# Patient Record
Sex: FEMALE | Race: WHITE | NOT HISPANIC OR LATINO | Employment: UNEMPLOYED | URBAN - METROPOLITAN AREA
[De-identification: names, ages, dates, MRNs, and addresses within clinical notes are randomized per-mention and may not be internally consistent; named-entity substitution may affect disease eponyms.]

---

## 2023-09-09 ENCOUNTER — HOSPITAL ENCOUNTER (OUTPATIENT)
Dept: DATA CONVERSION | Facility: HOSPITAL | Age: 48
Discharge: HOME | End: 2023-09-09

## 2023-09-09 DIAGNOSIS — E55.9 VITAMIN D DEFICIENCY, UNSPECIFIED: ICD-10-CM

## 2023-09-09 DIAGNOSIS — M32.19 OTHER ORGAN OR SYSTEM INVOLVEMENT IN SYSTEMIC LUPUS ERYTHEMATOSUS (MULTI): ICD-10-CM

## 2023-09-09 DIAGNOSIS — M06.09 RHEUMATOID ARTHRITIS WITHOUT RHEUMATOID FACTOR, MULTIPLE SITES (MULTI): ICD-10-CM

## 2023-09-09 LAB
ALBUMIN SERPL-MCNC: 3.6 GM/DL (ref 3.5–5)
ALBUMIN/GLOB SERPL: 1.1 RATIO (ref 1.5–3)
ALP BLD-CCNC: 93 U/L (ref 35–125)
ALT SERPL-CCNC: 9 U/L (ref 5–40)
ANION GAP SERPL CALCULATED.3IONS-SCNC: 10 MMOL/L (ref 0–19)
AST SERPL-CCNC: 14 U/L (ref 5–40)
BACTERIA SPEC CULT: NORMAL
BASOPHILS # BLD AUTO: 0.08 K/UL (ref 0–0.22)
BASOPHILS NFR BLD AUTO: 1.8 % (ref 0–1)
BILIRUB SERPL-MCNC: 0.2 MG/DL (ref 0.1–1.2)
BILIRUB UR QL STRIP.AUTO: NEGATIVE
BUN SERPL-MCNC: 13 MG/DL (ref 8–25)
BUN/CREAT SERPL: 26 RATIO (ref 8–21)
C3 SERPL-MCNC: 158 MG/DL (ref 68–260)
C4 SERPL-MCNC: 28 MG/DL (ref 16–64)
CALCIUM SERPL-MCNC: 8.9 MG/DL (ref 8.5–10.4)
CC # UR: NORMAL /UL
CHLORIDE SERPL-SCNC: 110 MMOL/L (ref 97–107)
CK SERPL-CCNC: 41 U/L (ref 24–195)
CLARITY UR: CLEAR
CO2 SERPL-SCNC: 25 MMOL/L (ref 24–31)
COLOR UR: YELLOW
CREAT SERPL-MCNC: 0.5 MG/DL (ref 0.4–1.6)
CRP SERPL-MCNC: 1 MG/DL (ref 0–2)
DEPRECATED RDW RBC AUTO: 49.5 FL (ref 37–54)
DIFFERENTIAL METHOD BLD: ABNORMAL
EOSINOPHIL # BLD AUTO: 0.13 K/UL (ref 0–0.45)
EOSINOPHIL NFR BLD: 2.9 % (ref 0–3)
ERYTHROCYTE [DISTWIDTH] IN BLOOD BY AUTOMATED COUNT: 14.7 % (ref 11.7–15)
ERYTHROCYTE [SEDIMENTATION RATE] IN BLOOD BY WESTERGREN METHOD: 78 MM/HR (ref 0–20)
GFR SERPL CREATININE-BSD FRML MDRD: 118 ML/MIN/1.73 M2
GLOBULIN SER-MCNC: 3.3 G/DL (ref 1.9–3.7)
GLUCOSE SERPL-MCNC: 93 MG/DL (ref 65–99)
GLUCOSE UR STRIP.AUTO-MCNC: NEGATIVE MG/DL
HCT VFR BLD AUTO: 37.1 % (ref 36–44)
HGB BLD-MCNC: 11.4 GM/DL (ref 12–15)
HGB UR QL STRIP.AUTO: ABNORMAL /HPF (ref 0–3)
HGB UR QL: NEGATIVE
IMM GRANULOCYTES # BLD AUTO: 0.01 K/UL (ref 0–0.1)
KETONES UR QL STRIP.AUTO: NEGATIVE
LEUKOCYTE ESTERASE UR QL STRIP.AUTO: ABNORMAL
LYMPHOCYTES # BLD AUTO: 1.21 K/UL (ref 1.2–3.2)
LYMPHOCYTES NFR BLD MANUAL: 27.3 % (ref 20–40)
MCH RBC QN AUTO: 28.4 PG (ref 26–34)
MCHC RBC AUTO-ENTMCNC: 30.7 % (ref 31–37)
MCV RBC AUTO: 92.5 FL (ref 80–100)
MICRO URNS: ABNORMAL
MICROSCOPIC (UA): ABNORMAL
MONOCYTES # BLD AUTO: 0.39 K/UL (ref 0–0.8)
MONOCYTES NFR BLD MANUAL: 8.8 % (ref 0–8)
NEUTROPHILS # BLD AUTO: 2.62 K/UL
NEUTROPHILS # BLD AUTO: 2.62 K/UL (ref 1.8–7.7)
NEUTROPHILS.IMMATURE NFR BLD: 0.2 % (ref 0–1)
NEUTS SEG NFR BLD: 59 % (ref 50–70)
NITRITE UR QL STRIP.AUTO: NEGATIVE
NRBC BLD-RTO: 0 /100 WBC
PH UR STRIP.AUTO: 5.5 [PH] (ref 4.6–8)
PLATELET # BLD AUTO: 144 K/UL (ref 150–450)
PMV BLD AUTO: 12.1 CU (ref 7–12.6)
POTASSIUM SERPL-SCNC: 3.7 MMOL/L (ref 3.4–5.1)
PROT SERPL-MCNC: 6.9 G/DL (ref 5.9–7.9)
PROT UR STRIP.AUTO-MCNC: ABNORMAL MG/DL
RBC # BLD AUTO: 4.01 M/UL (ref 4–4.9)
REF LAB TEST RESULTS: NORMAL
REPORT STATUS -LH SQ DATA CONVERSION: NORMAL
SERVICE CMNT-IMP: NORMAL
SODIUM SERPL-SCNC: 145 MMOL/L (ref 133–145)
SP GR UR STRIP.AUTO: 1.04 (ref 1–1.03)
SPECIMEN SOURCE: NORMAL
UNSPECIFIED CRY UR QL COMP ASSIST: ABNORMAL
URINE CULTURE: ABNORMAL
UROBILINOGEN UR QL STRIP.AUTO: NORMAL MG/DL (ref 0–1)
WBC # BLD AUTO: 4.4 K/UL (ref 4.5–11)
WBC #/AREA URNS AUTO: ABNORMAL /HPF (ref 0–3)

## 2023-09-10 LAB — DSDNA AB SER IA-ACNC: NORMAL [IU]/ML

## 2023-09-13 LAB — 1,25(OH)2D3 SERPL-MCNC: NORMAL PG/ML

## 2023-09-15 PROBLEM — E88.09 HYPERALBUMINEMIA: Status: ACTIVE | Noted: 2023-09-15

## 2023-09-15 PROBLEM — F41.8 MIXED ANXIETY DEPRESSIVE DISORDER: Status: ACTIVE | Noted: 2023-09-15

## 2023-09-15 PROBLEM — I89.0 LYMPHEDEMA: Status: ACTIVE | Noted: 2023-09-15

## 2023-09-15 PROBLEM — M79.7 FIBROMYALGIA: Status: ACTIVE | Noted: 2023-09-15

## 2023-09-15 PROBLEM — R87.811 VAGINAL HIGH RISK HPV DNA TEST POSITIVE: Status: ACTIVE | Noted: 2023-09-15

## 2023-09-15 PROBLEM — L03.90 CELLULITIS: Status: ACTIVE | Noted: 2023-09-15

## 2023-09-15 PROBLEM — M25.50 ARTHRALGIA: Status: ACTIVE | Noted: 2023-09-15

## 2023-09-15 PROBLEM — M35.00 SICCA (MULTI): Status: ACTIVE | Noted: 2023-09-15

## 2023-09-15 PROBLEM — M32.19 OTHER ORGAN OR SYSTEM INVOLVEMENT IN SYSTEMIC LUPUS ERYTHEMATOSUS (MULTI): Status: ACTIVE | Noted: 2023-09-15

## 2023-09-15 PROBLEM — S99.929A INJURY OF FOOT: Status: ACTIVE | Noted: 2023-09-15

## 2023-09-15 PROBLEM — M45.9 ANKYLOSING SPONDYLITIS (MULTI): Status: ACTIVE | Noted: 2023-09-15

## 2023-09-15 PROBLEM — K76.89 AUTOIMMUNE LIVER DISEASE: Status: ACTIVE | Noted: 2023-09-15

## 2023-09-15 PROBLEM — M06.09 SERONEGATIVE RHEUMATOID ARTHRITIS OF MULTIPLE SITES (MULTI): Status: ACTIVE | Noted: 2023-09-15

## 2023-09-15 PROBLEM — N92.6 IRREGULAR PERIODS: Status: ACTIVE | Noted: 2023-09-15

## 2023-09-15 PROBLEM — I73.00 RAYNAUD'S DISEASE WITHOUT GANGRENE: Status: ACTIVE | Noted: 2023-09-15

## 2023-09-15 PROBLEM — M06.9 RHEUMATOID ARTHRITIS (MULTI): Status: ACTIVE | Noted: 2023-09-15

## 2023-09-15 PROBLEM — N91.2 AMENORRHEA: Status: ACTIVE | Noted: 2023-09-15

## 2023-09-15 PROBLEM — H25.13 AGE-RELATED NUCLEAR CATARACT OF BOTH EYES: Status: ACTIVE | Noted: 2023-09-15

## 2023-09-15 PROBLEM — R87.610 ATYP SQUAM CELL OF UNDET SIGNFC CYTO SMR CRVX (ASC-US): Status: ACTIVE | Noted: 2023-09-15

## 2023-09-15 PROBLEM — Q28.8: Status: ACTIVE | Noted: 2023-09-15

## 2023-09-15 PROBLEM — R87.810 CERVICAL HIGH RISK HUMAN PAPILLOMAVIRUS (HPV) DNA TEST POSITIVE: Status: ACTIVE | Noted: 2023-09-15

## 2023-09-15 PROBLEM — G44.89 OTHER HEADACHE SYNDROME: Status: ACTIVE | Noted: 2023-09-15

## 2023-09-15 PROBLEM — E78.5 HYPERLIPIDEMIA: Status: ACTIVE | Noted: 2023-09-15
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
## 2023-09-15 PROBLEM — E66.9 OBESITY: Status: ACTIVE | Noted: 2023-09-15

## 2023-09-15 PROBLEM — E53.8 VITAMIN B12 DEFICIENCY (NON ANEMIC): Status: ACTIVE | Noted: 2023-09-15

## 2023-09-15 PROBLEM — D69.6 THROMBOCYTOPENIA (CMS-HCC): Status: ACTIVE | Noted: 2023-09-15

## 2023-09-15 PROBLEM — E03.9 HYPOTHYROIDISM: Status: ACTIVE | Noted: 2023-09-15

## 2023-09-15 PROBLEM — R07.89 ATYPICAL CHEST PAIN: Status: ACTIVE | Noted: 2023-09-15

## 2023-09-15 PROBLEM — H44.23 PATHOLOGIC MYOPIA, BILATERAL: Status: ACTIVE | Noted: 2023-09-15

## 2023-09-15 PROBLEM — E55.9 VITAMIN D DEFICIENCY: Status: ACTIVE | Noted: 2023-09-15

## 2023-09-15 PROBLEM — R59.1 LYMPHADENOPATHY: Status: ACTIVE | Noted: 2023-09-15

## 2023-09-15 PROBLEM — Q24.9 CONGENITAL HEART DEFECT (HHS-HCC): Status: ACTIVE | Noted: 2023-09-15

## 2023-09-15 RX ORDER — PETROLATUM,WHITE 41 %
OINTMENT (GRAM) TOPICAL DAILY
COMMUNITY
Start: 2021-07-21 | End: 2024-03-04 | Stop reason: WASHOUT

## 2023-09-15 RX ORDER — ARTIFICIAL TEARS 1; 2; 3 MG/ML; MG/ML; MG/ML
SOLUTION/ DROPS OPHTHALMIC
COMMUNITY
End: 2024-03-04 | Stop reason: WASHOUT

## 2023-09-15 RX ORDER — SKIN CLEANSER
CLEANSER (GRAM) TOPICAL
COMMUNITY
Start: 2018-04-05 | End: 2024-03-04 | Stop reason: WASHOUT

## 2023-09-15 RX ORDER — HYDROXYZINE HYDROCHLORIDE 10 MG/1
TABLET, FILM COATED ORAL EVERY 8 HOURS PRN
COMMUNITY
Start: 2020-05-21 | End: 2024-03-04 | Stop reason: WASHOUT

## 2023-09-15 RX ORDER — FLUTICASONE PROPIONATE 50 MCG
1 SPRAY, SUSPENSION (ML) NASAL DAILY
COMMUNITY
Start: 2015-04-10 | End: 2024-03-04 | Stop reason: WASHOUT

## 2023-09-15 RX ORDER — TRIAMCINOLONE ACETONIDE 1 MG/G
OINTMENT TOPICAL
COMMUNITY
Start: 2021-01-12 | End: 2024-03-04 | Stop reason: WASHOUT

## 2023-09-15 RX ORDER — ERGOCALCIFEROL 1.25 MG/1
1 CAPSULE ORAL 3 TIMES WEEKLY
COMMUNITY
Start: 2020-08-10 | End: 2024-04-24 | Stop reason: SDUPTHER

## 2023-09-15 RX ORDER — CLOTRIMAZOLE AND BETAMETHASONE DIPROPIONATE 10; .64 MG/G; MG/G
CREAM TOPICAL
COMMUNITY
Start: 2020-11-30 | End: 2024-03-04 | Stop reason: WASHOUT

## 2023-09-15 RX ORDER — NABUMETONE 750 MG/1
1 TABLET, FILM COATED ORAL 2 TIMES DAILY PRN
COMMUNITY
Start: 2015-04-10 | End: 2024-03-04 | Stop reason: WASHOUT

## 2023-09-15 RX ORDER — HYDROXYZINE HYDROCHLORIDE 25 MG/1
25 TABLET, FILM COATED ORAL EVERY 6 HOURS PRN
COMMUNITY
Start: 2023-05-08 | End: 2024-03-04 | Stop reason: WASHOUT

## 2023-09-15 RX ORDER — LEVOTHYROXINE SODIUM 100 UG/1
1 TABLET ORAL DAILY
COMMUNITY
Start: 2015-04-10 | End: 2024-03-04 | Stop reason: WASHOUT

## 2023-09-15 RX ORDER — LEFLUNOMIDE 20 MG/1
20 TABLET ORAL DAILY
COMMUNITY
Start: 2022-07-25 | End: 2024-03-04 | Stop reason: WASHOUT

## 2023-09-15 RX ORDER — HYDROXYCHLOROQUINE SULFATE 200 MG/1
200 TABLET, FILM COATED ORAL 2 TIMES DAILY
COMMUNITY
Start: 2015-04-10 | End: 2024-04-24 | Stop reason: SDUPTHER

## 2023-09-15 RX ORDER — LANOLIN ALCOHOL/MO/W.PET/CERES
1 CREAM (GRAM) TOPICAL DAILY
COMMUNITY
Start: 2015-04-10

## 2023-09-15 RX ORDER — LEFLUNOMIDE 10 MG/1
TABLET ORAL
COMMUNITY
Start: 2020-11-30 | End: 2024-03-04 | Stop reason: WASHOUT

## 2023-09-15 RX ORDER — PREDNISONE 10 MG/1
10 TABLET ORAL DAILY
COMMUNITY
Start: 2023-05-08 | End: 2024-03-04 | Stop reason: WASHOUT

## 2023-09-15 RX ORDER — FLUCONAZOLE 150 MG/1
TABLET ORAL
COMMUNITY
Start: 2021-03-19 | End: 2024-03-04 | Stop reason: WASHOUT

## 2023-09-15 RX ORDER — LEVOTHYROXINE SODIUM 137 UG/1
137 TABLET ORAL
COMMUNITY
Start: 2020-05-29

## 2023-09-15 RX ORDER — MUPIROCIN 20 MG/G
1 OINTMENT TOPICAL 3 TIMES DAILY
COMMUNITY
End: 2024-04-24 | Stop reason: ALTCHOICE

## 2023-09-15 RX ORDER — FOLIC ACID 1 MG/1
TABLET ORAL
COMMUNITY
Start: 2020-11-30 | End: 2024-03-04 | Stop reason: WASHOUT

## 2023-09-15 RX ORDER — HYDROCORTISONE 25 MG/G
1 CREAM TOPICAL DAILY
COMMUNITY
Start: 2020-10-13 | End: 2024-03-04 | Stop reason: WASHOUT

## 2023-09-15 RX ORDER — FUROSEMIDE 20 MG/1
20 TABLET ORAL DAILY
COMMUNITY
Start: 2021-07-07 | End: 2024-03-04 | Stop reason: WASHOUT

## 2023-09-16 PROBLEM — H35.341 LAMELLAR MACULAR HOLE, RIGHT EYE: Status: ACTIVE | Noted: 2023-09-16

## 2023-09-16 PROBLEM — H35.371 EPIRETINAL MEMBRANE (ERM) OF RIGHT EYE: Status: ACTIVE | Noted: 2023-09-16

## 2023-09-16 RX ORDER — METRONIDAZOLE 500 MG/1
TABLET ORAL
COMMUNITY
End: 2024-03-04 | Stop reason: WASHOUT

## 2023-09-16 RX ORDER — METRONIDAZOLE 7.5 MG/G
CREAM TOPICAL
COMMUNITY
End: 2024-03-04 | Stop reason: WASHOUT

## 2023-09-16 RX ORDER — DOXYCYCLINE 50 MG/1
TABLET ORAL
COMMUNITY
End: 2024-03-04 | Stop reason: WASHOUT

## 2023-10-17 ENCOUNTER — OFFICE VISIT (OUTPATIENT)
Dept: OPHTHALMOLOGY | Facility: CLINIC | Age: 48
End: 2023-10-17
Payer: COMMERCIAL

## 2023-10-17 DIAGNOSIS — H35.371 EPIRETINAL MEMBRANE (ERM) OF RIGHT EYE: Primary | ICD-10-CM

## 2023-10-17 DIAGNOSIS — H43.821 VITREOMACULAR TRACTION SYNDROME, RIGHT: ICD-10-CM

## 2023-10-17 DIAGNOSIS — H35.372 EPIRETINAL MEMBRANE (ERM) OF LEFT EYE: ICD-10-CM

## 2023-10-17 PROCEDURE — 92134 CPTRZ OPH DX IMG PST SGM RTA: CPT | Mod: BILATERAL PROCEDURE

## 2023-10-17 PROCEDURE — 99213 OFFICE O/P EST LOW 20 MIN: CPT

## 2023-10-17 ASSESSMENT — VISUAL ACUITY
OD_CC: 20/40
OS_CC: 20/30
METHOD: SNELLEN - LINEAR

## 2023-10-17 ASSESSMENT — CONF VISUAL FIELD
OS_SUPERIOR_NASAL_RESTRICTION: 0
OS_SUPERIOR_TEMPORAL_RESTRICTION: 0
OD_SUPERIOR_NASAL_RESTRICTION: 0
OD_INFERIOR_NASAL_RESTRICTION: 0
OS_NORMAL: 1
OS_INFERIOR_TEMPORAL_RESTRICTION: 0
OD_SUPERIOR_TEMPORAL_RESTRICTION: 0
OD_NORMAL: 1
OS_INFERIOR_NASAL_RESTRICTION: 0
OD_INFERIOR_TEMPORAL_RESTRICTION: 0

## 2023-10-17 ASSESSMENT — ENCOUNTER SYMPTOMS
NEUROLOGICAL NEGATIVE: 0
CONSTITUTIONAL NEGATIVE: 0
ALLERGIC/IMMUNOLOGIC NEGATIVE: 0
GASTROINTESTINAL NEGATIVE: 0
PSYCHIATRIC NEGATIVE: 0
RESPIRATORY NEGATIVE: 0
CARDIOVASCULAR NEGATIVE: 0
ENDOCRINE NEGATIVE: 0
EYES NEGATIVE: 0
HEMATOLOGIC/LYMPHATIC NEGATIVE: 0
MUSCULOSKELETAL NEGATIVE: 0

## 2023-10-17 ASSESSMENT — SLIT LAMP EXAM - LIDS
COMMENTS: NORMAL
COMMENTS: NORMAL

## 2023-10-17 ASSESSMENT — TONOMETRY
OD_IOP_MMHG: 16
IOP_METHOD: GOLDMANN APPLANATION
OS_IOP_MMHG: 16

## 2023-10-17 ASSESSMENT — EXTERNAL EXAM - LEFT EYE: OS_EXAM: NORMAL

## 2023-10-17 ASSESSMENT — CUP TO DISC RATIO
OD_RATIO: 0.3
OS_RATIO: 0.3

## 2023-10-17 ASSESSMENT — EXTERNAL EXAM - RIGHT EYE: OD_EXAM: NORMAL

## 2023-10-17 NOTE — PROGRESS NOTES
Pathologic myopia, [id number]  - Lattice at 3 o'clock, right eye (OD)     Vitreomacular traction syndrome, right  Epiretinal membrane (ERM) of left eye  - OCT:   Right eye (OD): focal VMT with intraretinal cysts  OS: Normal retinal contour, thickness, mild ERM    Impression:  Focal VMT, right eye (OD) - not visually significant;   Observe  RTC in 3 months    Long-term use of [id number]  - Duration: Started ~2003 for lupus  - Dose: 400 mg/day   - No renal or liver disease  - No visual complaints  - Prior HVF showed non-specific depression with poor markers of reliability  - No evidence of hydroxychloroquine toxicity on exam or OCT  - Continue care with dr. Faustin     Age-related nuclear cataract of both eyesH25.13  - Suspect secondary to lupus with history of steroid use

## 2024-01-18 ENCOUNTER — TRANSCRIBE ORDERS (OUTPATIENT)
Dept: RHEUMATOLOGY | Facility: CLINIC | Age: 49
End: 2024-01-18

## 2024-01-18 DIAGNOSIS — Z79.899 ON LONG TERM LEFLUNOMIDE THERAPY: ICD-10-CM

## 2024-01-18 DIAGNOSIS — D64.9 NORMOCYTIC ANEMIA: ICD-10-CM

## 2024-01-18 DIAGNOSIS — R53.83 FATIGUE, UNSPECIFIED TYPE: ICD-10-CM

## 2024-01-18 DIAGNOSIS — Z79.899 LONG-TERM USE OF PLAQUENIL: ICD-10-CM

## 2024-01-18 DIAGNOSIS — M06.09 SERONEGATIVE RHEUMATOID ARTHRITIS OF MULTIPLE SITES (MULTI): ICD-10-CM

## 2024-01-18 DIAGNOSIS — I89.0 LYMPHEDEMA: ICD-10-CM

## 2024-01-18 DIAGNOSIS — Z13.220 ENCOUNTER FOR SCREENING FOR LIPID DISORDER: ICD-10-CM

## 2024-01-18 DIAGNOSIS — Z79.899 OTHER LONG TERM (CURRENT) DRUG THERAPY: ICD-10-CM

## 2024-01-18 DIAGNOSIS — M32.19 OTHER SYSTEMIC LUPUS ERYTHEMATOSUS WITH OTHER ORGAN INVOLVEMENT (MULTI): ICD-10-CM

## 2024-01-23 ENCOUNTER — APPOINTMENT (OUTPATIENT)
Dept: OPHTHALMOLOGY | Facility: CLINIC | Age: 49
End: 2024-01-23

## 2024-02-07 ENCOUNTER — APPOINTMENT (OUTPATIENT)
Dept: RHEUMATOLOGY | Facility: CLINIC | Age: 49
End: 2024-02-07
Payer: COMMERCIAL

## 2024-02-11 ENCOUNTER — LAB REQUISITION (OUTPATIENT)
Dept: LAB | Facility: HOSPITAL | Age: 49
End: 2024-02-11

## 2024-02-11 DIAGNOSIS — L03.115 CELLULITIS OF RIGHT LOWER LIMB: ICD-10-CM

## 2024-02-11 PROCEDURE — 87186 SC STD MICRODIL/AGAR DIL: CPT

## 2024-02-11 PROCEDURE — 87205 SMEAR GRAM STAIN: CPT

## 2024-02-11 PROCEDURE — 87070 CULTURE OTHR SPECIMN AEROBIC: CPT

## 2024-02-11 PROCEDURE — 87075 CULTR BACTERIA EXCEPT BLOOD: CPT

## 2024-02-11 PROCEDURE — 87185 SC STD ENZYME DETCJ PER NZM: CPT

## 2024-02-16 LAB
B-LACTAMASE ORGANISM ISLT: POSITIVE
BACTERIA SPEC CULT: ABNORMAL
GRAM STN SPEC: ABNORMAL
GRAM STN SPEC: ABNORMAL

## 2024-03-04 ENCOUNTER — OFFICE VISIT (OUTPATIENT)
Dept: PRIMARY CARE | Facility: CLINIC | Age: 49
End: 2024-03-04
Payer: COMMERCIAL

## 2024-03-04 VITALS
DIASTOLIC BLOOD PRESSURE: 68 MMHG | WEIGHT: 293 LBS | HEIGHT: 62 IN | TEMPERATURE: 97.7 F | SYSTOLIC BLOOD PRESSURE: 112 MMHG | HEART RATE: 68 BPM | OXYGEN SATURATION: 99 % | RESPIRATION RATE: 18 BRPM | BODY MASS INDEX: 53.92 KG/M2

## 2024-03-04 DIAGNOSIS — L03.115 CELLULITIS OF RIGHT LOWER EXTREMITY: Primary | ICD-10-CM

## 2024-03-04 PROBLEM — S99.929A INJURY OF FOOT: Status: RESOLVED | Noted: 2023-09-15 | Resolved: 2024-03-04

## 2024-03-04 PROBLEM — Q28.8: Status: RESOLVED | Noted: 2023-09-15 | Resolved: 2024-03-04

## 2024-03-04 PROBLEM — R87.811 VAGINAL HIGH RISK HPV DNA TEST POSITIVE: Status: RESOLVED | Noted: 2023-09-15 | Resolved: 2024-03-04
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
## 2024-03-04 PROCEDURE — 1036F TOBACCO NON-USER: CPT | Performed by: NURSE PRACTITIONER

## 2024-03-04 PROCEDURE — 99213 OFFICE O/P EST LOW 20 MIN: CPT | Performed by: NURSE PRACTITIONER

## 2024-03-04 RX ORDER — AMOXICILLIN AND CLAVULANATE POTASSIUM 875; 125 MG/1; MG/1
875 TABLET, FILM COATED ORAL 2 TIMES DAILY
Qty: 14 TABLET | Refills: 0 | Status: SHIPPED | OUTPATIENT
Start: 2024-03-04 | End: 2024-03-11

## 2024-03-04 ASSESSMENT — PATIENT HEALTH QUESTIONNAIRE - PHQ9
SUM OF ALL RESPONSES TO PHQ9 QUESTIONS 1 AND 2: 0
1. LITTLE INTEREST OR PLEASURE IN DOING THINGS: NOT AT ALL
2. FEELING DOWN, DEPRESSED OR HOPELESS: NOT AT ALL

## 2024-03-04 ASSESSMENT — ENCOUNTER SYMPTOMS
CONSTITUTIONAL NEGATIVE: 1
GASTROINTESTINAL NEGATIVE: 1
RESPIRATORY NEGATIVE: 1
MUSCULOSKELETAL NEGATIVE: 1
CARDIOVASCULAR NEGATIVE: 1

## 2024-03-04 ASSESSMENT — PAIN SCALES - GENERAL: PAINLEVEL: 5

## 2024-03-04 NOTE — PROGRESS NOTES
"History Of Present Illness  Roro Marshall is a 48 y.o. female presenting for \"Follow-up (UC FOLLOW UP- RIGHT LEG INF- STATES SHE WAS GIVEN 2 ATB, AND A CULTURE WAS DONE, AND SHE WAS TOLD TO F/U WITH PCP. STATES SHE DOES NOT KNOW IF ATB HELPED).\"    HPI Pt was seen in UC and had infection in her right lower leg, had a mix of two bacteria, still not looking good, will give her another round of atb.  Pt will come back if not better by end of this round.  No fevers chills, some sanguinous drainage. Still warm to touch.  Pt legs are very large and skin is very dry.  Pt has not been wrapping and there is drainage on her jeans.  UC visit was beginning of February and she has not had this rechecked.  Explained the importance of taking care of this.      Past Medical History  Patient Active Problem List    Diagnosis Date Noted    Epiretinal membrane (ERM) of right eye 09/16/2023    Lamellar macular hole, right eye 09/16/2023    Age-related nuclear cataract of both eyes 09/15/2023    Amenorrhea 09/15/2023    Ankylosing spondylitis (CMS/HCC) 09/15/2023    Arthralgia 09/15/2023    Atyp squam cell of undet signfc cyto smr crvx (ASC-US) 09/15/2023    Atypical chest pain 09/15/2023    Cellulitis 09/15/2023    Congenital heart defect 09/15/2023    Mixed anxiety depressive disorder 09/15/2023    Fibromyalgia 09/15/2023    Hyperalbuminemia 09/15/2023    Hyperlipidemia 09/15/2023    Hypothyroidism 09/15/2023    Irregular periods 09/15/2023    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    Rheumatoid arthritis (CMS/HCC) 09/15/2023    Other organ or system involvement in systemic lupus erythematosus (CMS/[facility]) 09/15/2023    Lymphadenopathy 09/15/2023    Lymphedema 09/15/2023    Obesity 09/15/2023    Other headache syndrome 09/15/2023    Pathologic myopia, bilateral 09/15/2023    Raynaud's disease without gangrene 09/15/2023    Seronegative rheumatoid arthritis of multiple sites (CMS/[facility]) 09/15/2023    Sicca (CMS/[facility]) 09/15/2023    " Thrombocytopenia (CMS/HCC) 09/15/2023    Cervical high risk human papillomavirus (HPV) DNA test positive 09/15/2023    Vitamin B12 deficiency (non anemic) 09/15/2023    Vitamin D deficiency 09/15/2023        Medications  Current Outpatient Medications   Medication Instructions    amoxicillin-pot clavulanate (Augmentin) 875-125 mg tablet 875 mg, oral, 2 times daily    cyanocobalamin (Vitamin B-12) 1,000 mcg tablet 1 tablet, oral, Daily    ergocalciferol (Vitamin D-2) 1.25 MG (55674 UT) capsule 1 capsule, oral, 3 times weekly    hydroxychloroquine (PLAQUENIL) 200 mg, oral, 2 times daily    levothyroxine (SYNTHROID) 137 mcg, oral, Daily before breakfast    mupirocin (Bactroban) 2 % ointment 1 Application, Topical, 3 times daily        Surgical History  She has a past surgical history that includes Cholecystectomy (04/10/2015); Cardiac surgery (04/10/2015); and Other surgical history (04/10/2015).     Social History  She reports that she has never smoked. She has never been exposed to tobacco smoke. She has never used smokeless tobacco. She reports that she does not drink alcohol and does not use drugs.    Family History  Family History   Problem Relation Name Age of Onset    Brain Aneurysm Mother      Heart attack Father  66    Atrial fibrillation Father      Other (brain tumor- fluid on brain) Sister      Multiple sclerosis Sister      Anxiety disorder Brother      Heart disease Mother's Brother      Heart disease Father's Brother      No Known Problems Maternal Grandmother      Cancer Maternal Grandfather      Cancer Paternal Grandmother      Emphysema Paternal Grandfather      Lupus Cousin      Crohn's disease Cousin      Osteoporosis Other Grandmother         age related    Arthritis Other Grandmother     Hypertension Other Grandmother     Lupus Other          Allergies  Onion and Influenza virus vaccines    ROS  Review of Systems   Constitutional: Negative.    Respiratory: Negative.     Cardiovascular: Negative.     Gastrointestinal: Negative.    Genitourinary: Negative.    Musculoskeletal: Negative.         Last Recorded Vitals  /68 (BP Location: Right arm, Patient Position: Sitting, BP Cuff Size: Adult)   Pulse 68   Temp 36.5 °C (97.7 °F)   Resp 18   Wt 145 kg (320 lb 9.6 oz)   SpO2 99%     Physical Exam  Vitals and nursing note reviewed.   Constitutional:       Appearance: Normal appearance.   Eyes:      Pupils: Pupils are equal, round, and reactive to light.   Cardiovascular:      Rate and Rhythm: Normal rate and regular rhythm.      Pulses: Normal pulses.      Heart sounds: Normal heart sounds.   Pulmonary:      Effort: Pulmonary effort is normal.      Breath sounds: Normal breath sounds.   Neurological:      Mental Status: She is alert.         Relevant Results      Assessment/Plan   Roro was seen today for follow-up.  Diagnoses and all orders for this visit:  Cellulitis of right lower extremity (Primary)  -     amoxicillin-pot clavulanate (Augmentin) 875-125 mg tablet; Take 1 tablet (875 mg) by mouth 2 times a day for 7 days.          COUNSELING      Medication education:              Education:  The patient is counseled regarding potential side-effects of any and all new medications             Understanding:  Patient expressed understanding             Adherence:  No barriers to adherence identified        Indy Trejo, APRN-CNP

## 2024-03-12 ENCOUNTER — LAB (OUTPATIENT)
Dept: LAB | Facility: LAB | Age: 49
End: 2024-03-12
Payer: COMMERCIAL

## 2024-03-12 ENCOUNTER — OFFICE VISIT (OUTPATIENT)
Dept: PRIMARY CARE | Facility: CLINIC | Age: 49
End: 2024-03-12
Payer: COMMERCIAL

## 2024-03-12 VITALS
DIASTOLIC BLOOD PRESSURE: 72 MMHG | OXYGEN SATURATION: 98 % | BODY MASS INDEX: 53.92 KG/M2 | HEART RATE: 70 BPM | RESPIRATION RATE: 18 BRPM | TEMPERATURE: 97.8 F | WEIGHT: 293 LBS | SYSTOLIC BLOOD PRESSURE: 128 MMHG | HEIGHT: 62 IN

## 2024-03-12 DIAGNOSIS — I89.0 LYMPHEDEMA: ICD-10-CM

## 2024-03-12 DIAGNOSIS — L03.115 CELLULITIS OF RIGHT LOWER EXTREMITY: ICD-10-CM

## 2024-03-12 DIAGNOSIS — M06.09 SERONEGATIVE RHEUMATOID ARTHRITIS OF MULTIPLE SITES (MULTI): ICD-10-CM

## 2024-03-12 DIAGNOSIS — D64.9 NORMOCYTIC ANEMIA: ICD-10-CM

## 2024-03-12 DIAGNOSIS — M32.19 OTHER SYSTEMIC LUPUS ERYTHEMATOSUS WITH OTHER ORGAN INVOLVEMENT (MULTI): ICD-10-CM

## 2024-03-12 DIAGNOSIS — R53.83 FATIGUE, UNSPECIFIED TYPE: ICD-10-CM

## 2024-03-12 DIAGNOSIS — Z79.899 OTHER LONG TERM (CURRENT) DRUG THERAPY: ICD-10-CM

## 2024-03-12 DIAGNOSIS — Z13.220 ENCOUNTER FOR SCREENING FOR LIPID DISORDER: ICD-10-CM

## 2024-03-12 DIAGNOSIS — Z79.899 ON LONG TERM LEFLUNOMIDE THERAPY: ICD-10-CM

## 2024-03-12 DIAGNOSIS — L03.115 CELLULITIS OF RIGHT LOWER EXTREMITY: Primary | ICD-10-CM

## 2024-03-12 DIAGNOSIS — Z79.899 LONG-TERM USE OF PLAQUENIL: ICD-10-CM

## 2024-03-12 LAB
ALBUMIN SERPL-MCNC: 3.8 G/DL (ref 3.5–5)
ALP BLD-CCNC: 100 U/L (ref 35–125)
ALT SERPL-CCNC: 8 U/L (ref 5–40)
ANION GAP SERPL CALC-SCNC: 11 MMOL/L
AST SERPL-CCNC: 13 U/L (ref 5–40)
BASOPHILS # BLD AUTO: 0.06 X10*3/UL (ref 0–0.1)
BASOPHILS NFR BLD AUTO: 1.2 %
BILIRUB SERPL-MCNC: 0.3 MG/DL (ref 0.1–1.2)
BUN SERPL-MCNC: 13 MG/DL (ref 8–25)
C3 SERPL-MCNC: 173 MG/DL (ref 87–200)
C4 SERPL-MCNC: 41 MG/DL (ref 10–50)
CALCIUM SERPL-MCNC: 8.3 MG/DL (ref 8.5–10.4)
CHLORIDE SERPL-SCNC: 107 MMOL/L (ref 97–107)
CHOLEST SERPL-MCNC: 157 MG/DL (ref 133–200)
CHOLEST/HDLC SERPL: 3.3 {RATIO}
CK SERPL-CCNC: 41 U/L (ref 24–195)
CO2 SERPL-SCNC: 25 MMOL/L (ref 24–31)
CREAT SERPL-MCNC: 0.4 MG/DL (ref 0.4–1.6)
CRP SERPL-MCNC: 1.8 MG/DL (ref 0–2)
EGFRCR SERPLBLD CKD-EPI 2021: >90 ML/MIN/1.73M*2
EOSINOPHIL # BLD AUTO: 0.2 X10*3/UL (ref 0–0.7)
EOSINOPHIL NFR BLD AUTO: 3.8 %
ERYTHROCYTE [DISTWIDTH] IN BLOOD BY AUTOMATED COUNT: 14.1 % (ref 11.5–14.5)
ERYTHROCYTE [SEDIMENTATION RATE] IN BLOOD BY WESTERGREN METHOD: 72 MM/H (ref 0–20)
EST. AVERAGE GLUCOSE BLD GHB EST-MCNC: 94 MG/DL
GLUCOSE SERPL-MCNC: 94 MG/DL (ref 65–99)
HBA1C MFR BLD: 4.9 %
HCT VFR BLD AUTO: 37.8 % (ref 36–46)
HDLC SERPL-MCNC: 48 MG/DL
HGB BLD-MCNC: 11.5 G/DL (ref 12–16)
IMM GRANULOCYTES # BLD AUTO: 0.02 X10*3/UL (ref 0–0.7)
IMM GRANULOCYTES NFR BLD AUTO: 0.4 % (ref 0–0.9)
LDLC SERPL CALC-MCNC: 94 MG/DL (ref 65–130)
LYMPHOCYTES # BLD AUTO: 1.04 X10*3/UL (ref 1.2–4.8)
LYMPHOCYTES NFR BLD AUTO: 20 %
MCH RBC QN AUTO: 28.5 PG (ref 26–34)
MCHC RBC AUTO-ENTMCNC: 30.4 G/DL (ref 32–36)
MCV RBC AUTO: 94 FL (ref 80–100)
MONOCYTES # BLD AUTO: 0.27 X10*3/UL (ref 0.1–1)
MONOCYTES NFR BLD AUTO: 5.2 %
NEUTROPHILS # BLD AUTO: 3.62 X10*3/UL (ref 1.2–7.7)
NEUTROPHILS NFR BLD AUTO: 69.4 %
NRBC BLD-RTO: 0 /100 WBCS (ref 0–0)
PLATELET # BLD AUTO: 168 X10*3/UL (ref 150–450)
POTASSIUM SERPL-SCNC: 4.3 MMOL/L (ref 3.4–5.1)
PROT SERPL-MCNC: 6.6 G/DL (ref 5.9–7.9)
RBC # BLD AUTO: 4.03 X10*6/UL (ref 4–5.2)
SODIUM SERPL-SCNC: 143 MMOL/L (ref 133–145)
TRIGL SERPL-MCNC: 75 MG/DL (ref 40–150)
TSH SERPL DL<=0.05 MIU/L-ACNC: 3.02 MIU/L (ref 0.27–4.2)
WBC # BLD AUTO: 5.2 X10*3/UL (ref 4.4–11.3)

## 2024-03-12 PROCEDURE — 84443 ASSAY THYROID STIM HORMONE: CPT

## 2024-03-12 PROCEDURE — 82550 ASSAY OF CK (CPK): CPT

## 2024-03-12 PROCEDURE — 99213 OFFICE O/P EST LOW 20 MIN: CPT | Performed by: NURSE PRACTITIONER

## 2024-03-12 PROCEDURE — 86225 DNA ANTIBODY NATIVE: CPT

## 2024-03-12 PROCEDURE — 85025 COMPLETE CBC W/AUTO DIFF WBC: CPT

## 2024-03-12 PROCEDURE — 86140 C-REACTIVE PROTEIN: CPT

## 2024-03-12 PROCEDURE — 80053 COMPREHEN METABOLIC PANEL: CPT

## 2024-03-12 PROCEDURE — 36415 COLL VENOUS BLD VENIPUNCTURE: CPT

## 2024-03-12 PROCEDURE — 86160 COMPLEMENT ANTIGEN: CPT

## 2024-03-12 PROCEDURE — 83529 ASAY OF INTERLEUKIN-6 (IL-6): CPT

## 2024-03-12 PROCEDURE — 85652 RBC SED RATE AUTOMATED: CPT

## 2024-03-12 PROCEDURE — 1036F TOBACCO NON-USER: CPT | Performed by: NURSE PRACTITIONER

## 2024-03-12 PROCEDURE — 80061 LIPID PANEL: CPT

## 2024-03-12 PROCEDURE — 83036 HEMOGLOBIN GLYCOSYLATED A1C: CPT

## 2024-03-12 ASSESSMENT — PAIN SCALES - GENERAL: PAINLEVEL: 0-NO PAIN

## 2024-03-12 ASSESSMENT — PATIENT HEALTH QUESTIONNAIRE - PHQ9
SUM OF ALL RESPONSES TO PHQ9 QUESTIONS 1 AND 2: 0
2. FEELING DOWN, DEPRESSED OR HOPELESS: NOT AT ALL
1. LITTLE INTEREST OR PLEASURE IN DOING THINGS: NOT AT ALL

## 2024-03-12 NOTE — PROGRESS NOTES
"History Of Present Illness  Roro Marshall is a 48 y.o. female presenting for \"Follow-up (Pt is here for a follow up on her leg, she would like for pcp to take a look and to make sure the atb is working. Has one more dose left).\"    HPI pt is here to have leg rechecked still red, will order a cbc.  She did get a new lift chair with recliner and she now keeps legs elevated.      Past Medical History  Patient Active Problem List    Diagnosis Date Noted   • Epiretinal membrane (ERM) of right eye 09/16/2023   • Lamellar macular hole, right eye 09/16/2023   • Age-related nuclear cataract of both eyes 09/15/2023   • Amenorrhea 09/15/2023   • Ankylosing spondylitis (CMS/HCC) 09/15/2023   • Arthralgia 09/15/2023   • Atyp squam cell of undet signfc cyto smr crvx (ASC-US) 09/15/2023   • Atypical chest pain 09/15/2023   • Cellulitis 09/15/2023   • Congenital heart defect 09/15/2023   • Mixed anxiety depressive disorder 09/15/2023   • Fibromyalgia 09/15/2023   • Hyperalbuminemia 09/15/2023   • Hyperlipidemia 09/15/2023   • Hypothyroidism 09/15/2023   • Irregular periods 09/15/2023   • 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   • Rheumatoid arthritis (CMS/HCC) 09/15/2023   • Other organ or system involvement in systemic lupus erythematosus (CMS/HCC) 09/15/2023   • Lymphadenopathy 09/15/2023   • Lymphedema 09/15/2023   • Obesity 09/15/2023   • Other headache syndrome 09/15/2023   • Pathologic myopia, bilateral 09/15/2023   • Raynaud's disease without gangrene 09/15/2023   • Seronegative rheumatoid arthritis of multiple sites (CMS/HCC) 09/15/2023   • Sicca (CMS/HCC) 09/15/2023   • Thrombocytopenia (CMS/HCC) 09/15/2023   • Cervical high risk human papillomavirus (HPV) DNA test positive 09/15/2023   • Vitamin B12 deficiency (non anemic) 09/15/2023   • Vitamin D deficiency 09/15/2023        Medications  Current Outpatient Medications   Medication Instructions   • cyanocobalamin (Vitamin B-12) 1,000 mcg tablet 1 tablet, oral, Daily   • " ergocalciferol (Vitamin D-2) 1.25 MG (41762 UT) capsule 1 capsule, oral, 3 times weekly   • hydroxychloroquine (PLAQUENIL) 200 mg, oral, 2 times daily   • levothyroxine (SYNTHROID) 137 mcg, oral, Daily before breakfast   • mupirocin (Bactroban) 2 % ointment 1 Application, Topical, 3 times daily        Surgical History  She has a past surgical history that includes Cholecystectomy (04/10/2015); Cardiac surgery (04/10/2015); and Other surgical history (04/10/2015).     Social History  She reports that she has never smoked. She has never been exposed to tobacco smoke. She has never used smokeless tobacco. She reports that she does not drink alcohol and does not use drugs.    Family History  Family History   Problem Relation Name Age of Onset   • Brain Aneurysm Mother     • Heart attack Father  66   • Atrial fibrillation Father     • Other (brain tumor- fluid on brain) Sister     • Multiple sclerosis Sister     • Anxiety disorder Brother     • Heart disease Mother's Brother     • Heart disease Father's Brother     • No Known Problems Maternal Grandmother     • Cancer Maternal Grandfather     • Cancer Paternal Grandmother     • Emphysema Paternal Grandfather     • Lupus Cousin     • Crohn's disease Cousin     • Osteoporosis Other Grandmother         age related   • Arthritis Other Grandmother    • Hypertension Other Grandmother    • Lupus Other          Allergies  Onion and Influenza virus vaccines    ROS  Review of Systems     Last Recorded Vitals  /72 (BP Location: Right arm, Patient Position: Sitting, BP Cuff Size: Adult)   Pulse 70   Temp 36.6 °C (97.8 °F)   Resp 18   Wt 145 kg (320 lb)   SpO2 98%     Physical Exam    Relevant Results      Assessment/Plan   Roro was seen today for follow-up.  Diagnoses and all orders for this visit:  Cellulitis of right lower extremity (Primary)  -     Referral to Wound Clinic; Future  -     CBC and Auto Differential; Future          COUNSELING      Medication  education:              Education:  The patient is counseled regarding potential side-effects of any and all new medications             Understanding:  Patient expressed understanding             Adherence:  No barriers to adherence identified        Indy Trejo, APRN-CNP

## 2024-03-13 LAB
APPEARANCE UR: CLEAR
BILIRUB UR STRIP.AUTO-MCNC: NEGATIVE MG/DL
COLOR UR: COLORLESS
DSDNA AB SER-ACNC: <1 IU/ML
GLUCOSE UR STRIP.AUTO-MCNC: NORMAL MG/DL
KETONES UR STRIP.AUTO-MCNC: NEGATIVE MG/DL
LEUKOCYTE ESTERASE UR QL STRIP.AUTO: NEGATIVE
NITRITE UR QL STRIP.AUTO: NEGATIVE
PH UR STRIP.AUTO: 5.5 [PH]
PROT UR STRIP.AUTO-MCNC: NEGATIVE MG/DL
RBC # UR STRIP.AUTO: NEGATIVE /UL
SP GR UR STRIP.AUTO: 1.01
UROBILINOGEN UR STRIP.AUTO-MCNC: NORMAL MG/DL

## 2024-03-13 PROCEDURE — 81003 URINALYSIS AUTO W/O SCOPE: CPT

## 2024-03-15 LAB — IL6 SERPL-MCNC: 2.9 PG/ML

## 2024-03-18 ENCOUNTER — OFFICE VISIT (OUTPATIENT)
Dept: WOUND CARE | Facility: HOSPITAL | Age: 49
End: 2024-03-18
Payer: COMMERCIAL

## 2024-03-18 DIAGNOSIS — L03.115 CELLULITIS OF RIGHT LOWER EXTREMITY: ICD-10-CM

## 2024-03-18 LAB — SCAN RESULT: NORMAL

## 2024-03-18 PROCEDURE — 99213 OFFICE O/P EST LOW 20 MIN: CPT | Mod: 25

## 2024-03-18 PROCEDURE — 11042 DBRDMT SUBQ TIS 1ST 20SQCM/<: CPT

## 2024-03-18 PROCEDURE — 11045 DBRDMT SUBQ TISS EACH ADDL: CPT

## 2024-03-22 DIAGNOSIS — I73.9 PERIPHERAL VASCULAR DISEASE, UNSPECIFIED (CMS-HCC): ICD-10-CM

## 2024-03-22 DIAGNOSIS — L03.115 CELLULITIS OF RIGHT LOWER EXTREMITY: ICD-10-CM

## 2024-03-25 ENCOUNTER — OFFICE VISIT (OUTPATIENT)
Dept: WOUND CARE | Facility: HOSPITAL | Age: 49
End: 2024-03-25
Payer: COMMERCIAL

## 2024-03-25 PROCEDURE — 11042 DBRDMT SUBQ TIS 1ST 20SQCM/<: CPT

## 2024-03-25 PROCEDURE — 11045 DBRDMT SUBQ TISS EACH ADDL: CPT

## 2024-03-27 ENCOUNTER — APPOINTMENT (OUTPATIENT)
Dept: VASCULAR MEDICINE | Facility: CLINIC | Age: 49
End: 2024-03-27
Payer: COMMERCIAL

## 2024-04-01 ENCOUNTER — OFFICE VISIT (OUTPATIENT)
Dept: WOUND CARE | Facility: HOSPITAL | Age: 49
End: 2024-04-01
Payer: COMMERCIAL

## 2024-04-01 PROCEDURE — 99214 OFFICE O/P EST MOD 30 MIN: CPT

## 2024-04-15 ENCOUNTER — OFFICE VISIT (OUTPATIENT)
Dept: WOUND CARE | Facility: HOSPITAL | Age: 49
End: 2024-04-15
Payer: COMMERCIAL

## 2024-04-15 PROCEDURE — 11042 DBRDMT SUBQ TIS 1ST 20SQCM/<: CPT

## 2024-04-15 PROCEDURE — 11045 DBRDMT SUBQ TISS EACH ADDL: CPT

## 2024-04-19 RX ORDER — PREDNISONE 10 MG/1
TABLET ORAL EVERY 24 HOURS
COMMUNITY
Start: 2023-05-08 | End: 2024-04-24 | Stop reason: ALTCHOICE

## 2024-04-24 ENCOUNTER — OFFICE VISIT (OUTPATIENT)
Dept: RHEUMATOLOGY | Facility: CLINIC | Age: 49
End: 2024-04-24
Payer: COMMERCIAL

## 2024-04-24 VITALS
DIASTOLIC BLOOD PRESSURE: 72 MMHG | SYSTOLIC BLOOD PRESSURE: 118 MMHG | HEART RATE: 70 BPM | HEIGHT: 62 IN | WEIGHT: 293 LBS | BODY MASS INDEX: 53.92 KG/M2 | OXYGEN SATURATION: 98 %

## 2024-04-24 DIAGNOSIS — M06.09 SERONEGATIVE RHEUMATOID ARTHRITIS OF MULTIPLE SITES (MULTI): ICD-10-CM

## 2024-04-24 DIAGNOSIS — L03.115 CELLULITIS OF RIGHT LOWER EXTREMITY: ICD-10-CM

## 2024-04-24 DIAGNOSIS — I89.0 LYMPHEDEMA: Primary | ICD-10-CM

## 2024-04-24 DIAGNOSIS — E55.9 VITAMIN D DEFICIENCY: ICD-10-CM

## 2024-04-24 PROCEDURE — 99215 OFFICE O/P EST HI 40 MIN: CPT | Performed by: INTERNAL MEDICINE

## 2024-04-24 RX ORDER — ERGOCALCIFEROL 1.25 MG/1
1 CAPSULE ORAL 3 TIMES WEEKLY
Qty: 36 CAPSULE | Refills: 1 | Status: SHIPPED | OUTPATIENT
Start: 2024-04-24

## 2024-04-24 RX ORDER — HYDROXYCHLOROQUINE SULFATE 200 MG/1
200 TABLET, FILM COATED ORAL 2 TIMES DAILY
Qty: 180 TABLET | Refills: 3 | Status: SHIPPED | OUTPATIENT
Start: 2024-04-24

## 2024-04-24 ASSESSMENT — ENCOUNTER SYMPTOMS
LOSS OF SENSATION IN FEET: 0
DEPRESSION: 1
OCCASIONAL FEELINGS OF UNSTEADINESS: 1

## 2024-04-24 ASSESSMENT — ROUTINE ASSESSMENT OF PATIENT INDEX DATA (RAPID3)
SUM OF QUESTIONS A TO J: 8
TURN_FAUCETS_OFF: WITHOUT ANY DIFFICULTY
PARTIPATE_RECREATIONAL_ACTIVITIES: WITH MUCH DIFFICULTY
GOOD_NIGHTS_SLEEP: WITH MUCH DIFFICULTY
FEELINGS_DEPRESSION: WITH SOME DIFFICULTY
ON A SCALE OF ONE TO TEN, CONSIDERING ALL THE WAYS IN WHICH ILLNESS AND HEALTH CONDITIONS MAY AFFECT YOU AT THIS TIME, PLEASE INDICATE BELOW HOW YOU ARE DOING:: 5
LIFT_CUP_TO_MOUTH: WITHOUT ANY DIFFICULTY
TOTAL RAPID3 SCORE: 13.2
WALK_FLAT_GROUND: WITH SOME DIFFICULTY
FN_SCORE: 2.7
PICK_CLOTHES_OFF_FLOOR: WITHOUT ANY DIFFICULTY
IN_OUT_TRANSPORT: WITH SOME DIFFICULTY
DRESS_YOURSELF: WITH SOME DIFFICULTY
WASH_DRY_BODY: WITHOUT ANY DIFFICULTY
IN_OUT_BED: WITH SOME DIFFICULTY
ON A SCALE OF ONE TO TEN, HOW MUCH PAIN HAVE YOU HAD BECAUSE OF YOUR CONDITION OVER THE PAST WEEK?: 5.5
WEIGHTED_TOTAL_SCORE: 4.4
SEVERITY_SCORE: HIGH SEVERITY (HS)
ON A SCALE OF ONE TO TEN, HOW MUCH PAIN HAVE YOU HAD BECAUSE OF YOUR CONDITION OVER THE PAST WEEK?: 5.5
ON A SCALE OF ONE TO TEN, CONSIDERING ALL THE WAYS IN WHICH ILLNESS AND HEALTH CONDITIONS MAY AFFECT YOU AT THIS TIME, PLEASE INDICATE BELOW HOW YOU ARE DOING:: 5
FEELINGS_ANXIETY_NERVOUS: WITH SOME DIFFICULTY
SEVERITY_SCORE: 0
WALK_KILOMETERS: WITH MUCH DIFFICULTY

## 2024-04-24 ASSESSMENT — PATIENT HEALTH QUESTIONNAIRE - PHQ9
2. FEELING DOWN, DEPRESSED OR HOPELESS: NOT AT ALL
1. LITTLE INTEREST OR PLEASURE IN DOING THINGS: NOT AT ALL
SUM OF ALL RESPONSES TO PHQ9 QUESTIONS 1 AND 2: 0

## 2024-04-24 ASSESSMENT — PAIN SCALES - GENERAL: PAINLEVEL_OUTOF10: 4

## 2024-04-24 ASSESSMENT — PAIN - FUNCTIONAL ASSESSMENT: PAIN_FUNCTIONAL_ASSESSMENT: 0-10

## 2024-04-24 NOTE — PROGRESS NOTES
[facility]/  Rheumatology  [address], [address]  [place] [address]  Phone: [phone]  Fax: [phone]    Rheumatology Progress Note 4/24/24    Roro Marshall is a 48 y.o. female here for   Chief Complaint   Patient presents with    Follow-up       Last Visit: 9/28/23    Rheum Hx  Referred by ? for transfer of care/closer to home.  CC:  Slow onset  Precipitating factors:  stress, rainy, cold  Associated sx: joint pain mostly  Better: walking exercising- Vegan  Worse: laying around  Currently tender wrists and swollen feet  AM stiffness 20 min  Previous Tx:  naproxen- somewhat helpful  ibuprofen- very helpful  prednisone- very helpful but H/A  HCQ since 1/21/2004 100% helpful; no side effects  AZA 1/21/2004- d/c as doctor decided to take her off it; no side effects  predniosne 1/21/2004 H/A while on it for a week  ROS+  fever sometimes- better now  fatigue  dry eyes/mouth  sore throat- sometimes  mouth sores  swollen/tender glands/nodes  CP  heart dz- 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, had surgery at 6 y/o  LE swelling by end of the day  raynaud's without pitting or ulceration  SOB sometimes  cough sometimes  N/V sometimes- ?autoimmune hepatitis; after the gallbladder outconstant  diarrhea- became vegan and no more diarrhea  hx of anemia- b-12 def; became a vegan 1/6/2019  rashes  sun sens  joint pain, swelling, stiffness  Component      Latest Ref Rng 7/18/2020   C-ANCA (PR3) BY EIA (Note)…    C-ANCA FLOURESCENCE Negative…    P-ANCA (MPO) BY EIA (Note)…    P-ANCA FLOURESCENCE Negative…    ANCP INTERPRETATION (Note)…    Staff Review (Note)…    IgG Cardiolipin Ab <9…    IgM Cardiolipin Ab 9…    IgA Cardiolipin Ab <9…    HORTENCIA Positive…    HORTENCIA Titer 1:160…    HORTENCIA Pattern Speckled…    Beta-2 Glyco 1 IgG <9…    Beta 2 Glyco 1 IgM <9…    SSA ANTIBODIES <0.2…    SSB ANTIBODIES <0.2…    Angiotensin 1 Conv 21…    Thyroid Peroxidase (TPO) Antibody <1.0…    Anti-Smooth Muscle Antibody  Negative…    HLA-B27 Dna Result Negative…    Beta-2 Glyco 1 IgA 2…    Citrulline Antibody, IgG <15…    Ref Lab Result SEE REFERENCE LAB REPORT…    Centromere Ab Negative…    ZULAY-1 ANTIBODY <0.2…    Anti-Mitochondrial Antibody Negative…    Rheumatoid Factor      0 - 20 IU/ML 10    RNP Antibody 0.3…    RIBOSOMAL RNP AB <0.2…    SCL-70 ABS EIA <0.2…    SM Antibody <0.2…    Anti-Thyroglobulin AB <1.0…    LENIN, Broad Spectrum Enio Serum NEGATIVE Performed at [address] [address]        Previous Tx  naproxen- somewhat helpful  ibuprofen- very helpful  prednisone- very helpful but H/A  HCQ since 1/21/2004 100% helpful; no side effects  AZA 1/21/2004- d/c as doctor decided to take her off it; no side effects  prednisone 1/21/2004 H/A while on it for a week    Health Maintenance  DXA- none  Malignancy Hx- none  Component      Latest Ref Rng 7/18/2020   TB Result Negative…    Hepatitis B Surface AG      NEG  NEGATIVE    Hepatitis C AB Negative…    Lyme Antibodies Screen Negative…      Component      Latest Ref Rng 11/14/2022   HIV 1/2 Antigen/Antibody Screen with Reflex to Confirmation NONREACTIVE…        Immunization History   Administered Date(s) Administered    Influenza, seasonal, injectable, preservative free 02/09/2011    PPD Test 02/16/2018, 02/23/2018          Past Medical History:   Diagnosis Date    Anemia     B12 deficiency     Cardiac left ventricular ejection fraction greater than 40 percent     55-59%    Cataract     Heart trouble     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7    Hx of rheumatic fever     CHILDHOOD    Hyperlipidemia     Hypothyroid 2004    Lower extremity edema     Lupus (Multi) 2004    Osteoporosis     Plantar fasciitis     Pneumonia     RA (rheumatoid arthritis) (Multi)       Past Surgical History:   Procedure Laterality Date    CARDIAC SURGERY  04/10/2015    Heart Surgery    CARDIAC SURGERY      AGE 7    CHOLECYSTECTOMY  04/10/2015    Cholecystectomy     "CHOLECYSTECTOMY  02/21/2003    OTHER SURGICAL HISTORY  04/10/2015    Endotracheal Tube Insertion    WISDOM TOOTH EXTRACTION      2      Current Outpatient Medications   Medication Sig Dispense Refill    levothyroxine (Synthroid) 137 mcg tablet Take 1 tablet (137 mcg) by mouth once daily in the morning. Take before meals.      cyanocobalamin (Vitamin B-12) 1,000 mcg tablet Take 1 tablet (1,000 mcg) by mouth once daily.      ergocalciferol (Vitamin D-2) 1.25 MG (67602 UT) capsule Take 1 capsule (1,250 mcg) by mouth 3 times a week. 36 capsule 1    hydroxychloroquine (Plaquenil) 200 mg tablet Take 1 tablet (200 mg) by mouth 2 times a day. 180 tablet 3     No current facility-administered medications for this visit.      Allergies   Allergen Reactions    Onion Anaphylaxis    Spider Venom Other     Blister/ cellulitis    Influenza Virus Vaccines Other     Redness - Irritation        Visit Vitals  /72 (BP Location: Right arm, Patient Position: Sitting)   Pulse 70   Ht 1.575 m (5' 2\")   Wt 148 kg (325 lb 9.9 oz)   SpO2 98%   BMI 59.56 kg/m²   Smoking Status Never   BSA 2.54 m²      Pain Assessment Pain Score: 4, Pain Location: Leg (both legs)     Rapid 3  Function Score (FN): 2.7  Pain Score (PN) (0-10): 5.5  Patient Global (PTGL) (0-10): 5  Rapid3 Score: 13.2      Workup  Component      Latest Ref Rn 3/12/2024   LEUKOCYTES (10*3/UL) IN BLOOD BY AUTOMATED COUNT, Armenian      4.4 - 11.3 x10*3/uL 5.2    nRBC      0.0 - 0.0 /100 WBCs 0.0    ERYTHROCYTES (10*6/UL) IN BLOOD BY AUTOMATED COUNT, Armenian      4.00 - 5.20 x10*6/uL 4.03    HEMOGLOBIN      12.0 - 16.0 g/dL 11.5 (L)    HEMATOCRIT      36.0 - 46.0 % 37.8    MCV      80 - 100 fL 94    MCH      26.0 - 34.0 pg 28.5    MCHC      32.0 - 36.0 g/dL 30.4 (L)    RED CELL DISTRIBUTION WIDTH      11.5 - 14.5 % 14.1    PLATELETS (10*3/UL) IN BLOOD AUTOMATED COUNT, Armenian      150 - 450 x10*3/uL 168    NEUTROPHILS/100 LEUKOCYTES IN BLOOD BY AUTOMATED COUNT, Armenian      " 40.0 - 80.0 % 69.4    Immature Granulocytes %, Automated      0.0 - 0.9 % 0.4    Lymphocytes %      13.0 - 44.0 % 20.0    Monocytes %      2.0 - 10.0 % 5.2    Eosinophils %      0.0 - 6.0 % 3.8    Basophils %      0.0 - 2.0 % 1.2    NEUTROPHILS (10*3/UL) IN BLOOD BY AUTOMATED COUNT, Swiss      1.20 - 7.70 x10*3/uL 3.62    Immature Granulocytes Absolute, Automated      0.00 - 0.70 x10*3/uL 0.02    Lymphocytes Absolute      1.20 - 4.80 x10*3/uL 1.04 (L)    Monocytes Absolute      0.10 - 1.00 x10*3/uL 0.27    Eosinophils Absolute      0.00 - 0.70 x10*3/uL 0.20    Basophils Absolute      0.00 - 0.10 x10*3/uL 0.06    GLUCOSE      65 - 99 mg/dL 94    SODIUM      133 - 145 mmol/L 143    POTASSIUM      3.4 - 5.1 mmol/L 4.3    CHLORIDE      97 - 107 mmol/L 107    Bicarbonate      24 - 31 mmol/L 25    Blood Urea Nitrogen      8 - 25 mg/dL 13    Creatinine      0.40 - 1.60 mg/dL 0.40    EGFR      >60 mL/min/1.73m*2 >90    Calcium      8.5 - 10.4 mg/dL 8.3 (L)    Albumin      3.5 - 5.0 g/dL 3.8    Alkaline Phosphatase      35 - 125 U/L 100    Total Protein      5.9 - 7.9 g/dL 6.6    AST      5 - 40 U/L 13    Bilirubin Total      0.1 - 1.2 mg/dL 0.3    ALT      5 - 40 U/L 8    Anion Gap      <=19 mmol/L 11    CHOLESTEROL      133 - 200 mg/dL 157    HDL CHOLESTEROL      >50.0 mg/dL 48.0 (L)    Cholesterol/HDL Ratio      SEE COMMENT  3.3    LDL Calculated      65 - 130 mg/dL 94    TRIGLYCERIDES      40 - 150 mg/dL 75    Hemoglobin A1C      See below % 4.9    Estimated Average Glucose      Not Established mg/dL 94    C3 Complement      87 - 200 mg/dL 173    C4 Complement      10 - 50 mg/dL 41    Creatine Kinase      24 - 195 U/L 41    C-Reactive Protein      0.00 - 2.00 mg/dL 1.80    Anti-DNA (DS)      <5.0 IU/mL <1.0    Sed Rate      0 - 20 mm/h 72 (H)    Interleukin 6      <=2.0 pg/mL 2.9 (H)    Scan Result 14.3.3 neg   Thyroid Stimulating Hormone      0.27 - 4.20 mIU/L 3.02      Component      Latest Ref Rng 3/13/2024    Color, Urine      Light-Yellow, Yellow, Dark-Yellow  Colorless ! (N)    Appearance, Urine      Clear  Clear    Specific Gravity, Urine      1.005 - 1.035  1.008    pH, Urine      5.0, 5.5, 6.0, 6.5, 7.0, 7.5, 8.0  5.5    Protein, Urine      NEGATIVE, 10 (TRACE), 20 (TRACE) mg/dL NEGATIVE    Glucose, Urine      Normal mg/dL Normal    Blood, Urine      NEGATIVE  NEGATIVE    Ketones, Urine      NEGATIVE mg/dL NEGATIVE    Bilirubin, Urine      NEGATIVE  NEGATIVE    Urobilinogen, Urine      Normal mg/dL Normal    Nitrite, Urine      NEGATIVE  NEGATIVE    Leukocyte Esterase, Urine      NEGATIVE  NEGATIVE       Assessment/Plan  1. Lymphedema    2. Cellulitis of right lower extremity    3. Vitamin D deficiency    4. Seronegative rheumatoid arthritis of multiple sites (Multi)       Orders Placed This Encounter   Procedures    Vitamin D 25-Hydroxy,Total (for eval of Vitamin D levels)    CBC and Auto Differential    Comprehensive Metabolic Panel    C-Reactive Protein    Creatine Kinase    Sedimentation Rate    Urinalysis with Reflex Culture and Microscopic    [facility]; [facility]; [id number] - Miscellaneous Test    Vitamin D 25-Hydroxy,Total (for eval of Vitamin D levels)    Interleukin-6    Referral to Vascular Medicine            Since last appt, adherent and tolerating  mg daily.  RLE infection.. Cx obtained. Advised to hav IV antibiotic. Placed on 3 rounds of antiobiotics per urgent care.  Denies any recent or current infection.  Cx pseudomonas aeand MSSA  Improving, not resolved- following with wound care  To resume PT for lymphedema at [facility] when healed.  Not on any NSAIDs or glucocorticoids.  ROS+ for fatigue, sicca, nausea, rashes, joint pain/swelling/stiffness, depression/anxiety.  Rapid 3 consistent with high severity.  Labs reviewed  D/w pt tx options and decided on   Continue current regimen  Vascular med eval and management  Wound care  Lymphedema PT  Advised of possible side effects and importance of  monitoring.   All questions answered.  Patient to follow up with primary care provider regarding all other medical issues not addressed today and for medical chart updating.    Mariana Hart MD      Patient Care Team:  LESLEE Calix-CNP as PCP - [facility] PCP  Tawanna Lopez DO as Primary Care Provider  Mariana Hart MD as Consulting Physician (Rheumatology)

## 2024-08-28 ENCOUNTER — APPOINTMENT (OUTPATIENT)
Dept: RHEUMATOLOGY | Facility: CLINIC | Age: 49
End: 2024-08-28
Payer: COMMERCIAL

## 2024-09-09 ENCOUNTER — OFFICE VISIT (OUTPATIENT)
Dept: WOUND CARE | Facility: HOSPITAL | Age: 49
End: 2024-09-09
Payer: COMMERCIAL

## 2024-09-09 DIAGNOSIS — I89.0 LYMPHEDEMA: Primary | ICD-10-CM

## 2024-09-09 PROCEDURE — 11042 DBRDMT SUBQ TIS 1ST 20SQCM/<: CPT

## 2024-09-29 ENCOUNTER — HOSPITAL ENCOUNTER (EMERGENCY)
Facility: HOSPITAL | Age: 49
Discharge: HOME | End: 2024-09-29
Attending: STUDENT IN AN ORGANIZED HEALTH CARE EDUCATION/TRAINING PROGRAM
Payer: COMMERCIAL

## 2024-09-29 ENCOUNTER — APPOINTMENT (OUTPATIENT)
Dept: URGENT CARE | Age: 49
End: 2024-09-29
Payer: COMMERCIAL

## 2024-09-29 VITALS
RESPIRATION RATE: 18 BRPM | WEIGHT: 293 LBS | BODY MASS INDEX: 53.92 KG/M2 | TEMPERATURE: 98.1 F | HEART RATE: 73 BPM | DIASTOLIC BLOOD PRESSURE: 74 MMHG | OXYGEN SATURATION: 100 % | HEIGHT: 62 IN | SYSTOLIC BLOOD PRESSURE: 147 MMHG

## 2024-09-29 DIAGNOSIS — I89.0 CHRONIC ACQUIRED LYMPHEDEMA: Primary | ICD-10-CM

## 2024-09-29 DIAGNOSIS — R21 RASH AND NONSPECIFIC SKIN ERUPTION: ICD-10-CM

## 2024-09-29 PROCEDURE — 99281 EMR DPT VST MAYX REQ PHY/QHP: CPT

## 2024-09-29 ASSESSMENT — PAIN DESCRIPTION - ORIENTATION: ORIENTATION: RIGHT

## 2024-09-29 ASSESSMENT — PAIN DESCRIPTION - PAIN TYPE: TYPE: CHRONIC PAIN

## 2024-09-29 ASSESSMENT — PAIN - FUNCTIONAL ASSESSMENT: PAIN_FUNCTIONAL_ASSESSMENT: 0-10

## 2024-09-29 ASSESSMENT — COLUMBIA-SUICIDE SEVERITY RATING SCALE - C-SSRS
2. HAVE YOU ACTUALLY HAD ANY THOUGHTS OF KILLING YOURSELF?: NO
1. IN THE PAST MONTH, HAVE YOU WISHED YOU WERE DEAD OR WISHED YOU COULD GO TO SLEEP AND NOT WAKE UP?: NO
6. HAVE YOU EVER DONE ANYTHING, STARTED TO DO ANYTHING, OR PREPARED TO DO ANYTHING TO END YOUR LIFE?: NO

## 2024-09-29 ASSESSMENT — PAIN DESCRIPTION - LOCATION: LOCATION: LEG

## 2024-09-29 ASSESSMENT — PAIN SCALES - GENERAL: PAINLEVEL_OUTOF10: 3

## 2024-09-29 NOTE — ED TRIAGE NOTES
BIBPV for lymphedema that is weeping clear fluid from the back of her knee joint. Hx of lymphedema but the weeping started this morning. No hx of blood clots in that leg. No increased pain in that leg. No CP or SOB, in NAD.

## 2024-09-29 NOTE — ED PROVIDER NOTES
HPI   Chief Complaint   Patient presents with    Leg Swelling       This is a 48-year-old female presenting to the ED for evaluation management of fluid draining from the right lower extremity where she has chronic lymphedema.  She follows with the wound care center at this hospital for treatment of her lymphedema.  She has a couple of wounds to the lower leg which are well-healed, she was instructed to stop wearing her compression socks while these were healing.  She states she was sitting in her recliner with her legs up, fell asleep in the recliner last night and when she woke up there was a pool of fluid underneath the leg seemingly coming from behind the knee.  Dressing was applied in triage and she was brought back to the main room for further assessment.  She denies any fevers or chills or noticeable rash developing to the area preceding the symptoms.  She denies any pain in the area.        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7    Hx of rheumatic fever     CHILDHOOD    Hyperlipidemia     Hypothyroid 2004    Lower extremity edema     Lupus (Multi) 2004    Osteoporosis     Plantar fasciitis     Pneumonia     RA (rheumatoid arthritis) (Multi)      Past Surgical History:   Procedure Laterality Date    CARDIAC SURGERY  04/10/2015    Heart Surgery    CARDIAC SURGERY      AGE 7    CHOLECYSTECTOMY  04/10/2015    Cholecystectomy    CHOLECYSTECTOMY  02/21/2003    OTHER SURGICAL HISTORY  04/10/2015    Endotracheal Tube Insertion    WISDOM TOOTH EXTRACTION      2     Family History   Problem Relation Name Age of Onset    Brain Aneurysm Mother      Heart attack Father  66    Atrial fibrillation Father      Diabetes Father      Other (brain  tumor- fluid on brain) Sister      Multiple sclerosis Sister      Anxiety disorder Brother      Heart disease Mother's Brother      Heart disease Father's Brother      No Known Problems Maternal Grandmother      Cancer Maternal Grandfather      Cancer Paternal Grandmother      Emphysema Paternal Grandfather      Lupus Cousin      Crohn's disease Cousin      Osteoporosis Other Grandmother         age related    Arthritis Other Grandmother     Hypertension Other Grandmother     Lupus Other       Social History     Tobacco Use    Smoking status: Never     Passive exposure: Never    Smokeless tobacco: Never   Vaping Use    Vaping status: Never Used   Substance Use Topics    Alcohol use: Never    Drug use: Never       Physical Exam   ED Triage Vitals [09/29/24 1020]   Temperature Heart Rate Respirations BP   36.7 °C (98.1 °F) 73 18 147/74      Pulse Ox Temp Source Heart Rate Source Patient Position   100 % Temporal -- --      BP Location FiO2 (%)     -- --       Physical Exam  General: well developed, morbidly obese adult female who is awake and alert, in no apparent distress  HENT: normocephalic, atraumatic.   MSK: Chronic lymphedema to the legs bilaterally, about equal left versus right  Psych: appropriate mood and affect, cooperative with exam  Skin: warm, dry, 1 small punctate lesion to the right lateral leg just proximal to the knee that is slowly leaking clear fluid.  No surrounding cellulitis or erythema, no laceration.  No leakage of fluid in the skin folds.  She also has a small area of dry scaling skin with a few punctate lesions mixed in at the left elbow consistent with eczema.    ED Course & MDM   Diagnoses as of 09/29/24 1112   Chronic acquired lymphedema   Rash and nonspecific skin eruption                 No data recorded     [place] Coma Scale Score: 15 (09/29/24 1023 : Malu Miranda RN)                           Medical Decision Making  The patient is in no acute distress here, she is seated in a  hospital bed.  On inspection of the area, I remove the dressing.  There is no significant amount of fluid leaking from behind the knee or in the skin folds to the area.  There is a very small punctate lesion that seems to be draining fluid 1 drop at a time, very slowly.  There is no surrounding cellulitis or erythema, no surrounding skin changes whatsoever.  There is no laceration or skin defect amenable to closure.  A new dressing was applied over the site, patient was advised to resume wearing her compression socks and other additional management for lymphedema.  She is to follow-up with the wound care center.  No indication for antibiotics currently as there are no signs of infection.    She also complains of a rash to the left elbow which is itchy.  On examination it appears dry and scaly, there are a few punctate scattered lesions throughout the area, exam most consistent with eczema.  She was advised to use topical hydrocortisone cream.  She is to follow-up with her PCP.  She was discharged in stable condition.    Procedure  Procedures     Isaias Hyman DO  09/29/24 1112

## 2024-09-29 NOTE — DISCHARGE INSTRUCTIONS
Replace the dressing over the weekend area to catch any fluid that seeps out.  Return to ED for any new or worsening symptoms including development of red, painful, warm rash to the area which could be a sign of developing infection and require antibiotic treatment.  Follow-up with the wound care center for further management of her lymphedema.  Try to keep the legs elevated is much as possible, or compression socks to reduce swelling in the legs.    Regarding the rash on your arm you can use hydrocortisone cream which can be purchased over-the-counter.  You can follow-up with your primary care physician for further management of this outside the hospital.

## 2024-10-26 ENCOUNTER — HOSPITAL ENCOUNTER (EMERGENCY)
Facility: HOSPITAL | Age: 49
Discharge: HOME | End: 2024-10-26
Payer: COMMERCIAL

## 2024-10-26 ENCOUNTER — APPOINTMENT (OUTPATIENT)
Dept: RADIOLOGY | Facility: HOSPITAL | Age: 49
End: 2024-10-26
Payer: COMMERCIAL

## 2024-10-26 VITALS
SYSTOLIC BLOOD PRESSURE: 130 MMHG | OXYGEN SATURATION: 100 % | HEIGHT: 62 IN | DIASTOLIC BLOOD PRESSURE: 71 MMHG | WEIGHT: 293 LBS | BODY MASS INDEX: 53.92 KG/M2 | HEART RATE: 74 BPM | RESPIRATION RATE: 19 BRPM | TEMPERATURE: 97.2 F
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
## 2024-10-26 DIAGNOSIS — S09.90XA CLOSED HEAD INJURY, INITIAL ENCOUNTER: ICD-10-CM

## 2024-10-26 PROCEDURE — 99284 EMERGENCY DEPT VISIT MOD MDM: CPT | Mod: 25

## 2024-10-26 PROCEDURE — 70450 CT HEAD/BRAIN W/O DYE: CPT | Performed by: RADIOLOGY

## 2024-10-26 PROCEDURE — 70450 CT HEAD/BRAIN W/O DYE: CPT

## 2024-10-26 PROCEDURE — 2500000001 HC RX 250 WO HCPCS SELF ADMINISTERED DRUGS (ALT 637 FOR MEDICARE OP)

## 2024-10-26 RX ORDER — ACETAMINOPHEN 325 MG/1
975 TABLET ORAL ONCE
Status: COMPLETED | OUTPATIENT
Start: 2024-10-26 | End: 2024-10-26

## 2024-10-26 RX ADMIN — ACETAMINOPHEN 975 MG: 325 TABLET ORAL at 18:07

## 2024-10-26 ASSESSMENT — PAIN DESCRIPTION - LOCATION: LOCATION: HEAD

## 2024-10-26 ASSESSMENT — LIFESTYLE VARIABLES
EVER HAD A DRINK FIRST THING IN THE MORNING TO STEADY YOUR NERVES TO GET RID OF A HANGOVER: NO
HAVE PEOPLE ANNOYED YOU BY CRITICIZING YOUR DRINKING: NO
TOTAL SCORE: 0
EVER FELT BAD OR GUILTY ABOUT YOUR DRINKING: NO
HAVE YOU EVER FELT YOU SHOULD CUT DOWN ON YOUR DRINKING: NO

## 2024-10-26 ASSESSMENT — PAIN - FUNCTIONAL ASSESSMENT: PAIN_FUNCTIONAL_ASSESSMENT: 0-10

## 2024-10-26 ASSESSMENT — PAIN DESCRIPTION - PROGRESSION: CLINICAL_PROGRESSION: GRADUALLY IMPROVING

## 2024-10-26 ASSESSMENT — PAIN SCALES - GENERAL: PAINLEVEL_OUTOF10: 4

## 2024-10-26 ASSESSMENT — PAIN DESCRIPTION - PAIN TYPE: TYPE: ACUTE PAIN

## 2024-10-26 NOTE — ED PROVIDER NOTES
HPI   Chief Complaint   Patient presents with    Fall       Patient is a 48-year-old female with past medical history of lymphedema presenting via EMS after a fall at work.  She reports that she fell backwards and hit the back of her head on the printer.  Today she was not feeling lightheaded or dizzy prior to the fall.  She states she did not lose consciousness after the fall.  Patient is not on blood thinners.  Patient was ambulatory off of the EMS cot.  Patient denies fevers, chills, cough, sore throat, runny nose, chest pain, shortness of breath, abdominal pain, nausea, vomiting, diarrhea or urinary complaints.              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7    Hx of rheumatic fever     CHILDHOOD    Hyperlipidemia     Hypothyroid 2004    Lower extremity edema     Lupus 2004    Osteoporosis     Plantar fasciitis     Pneumonia     RA (rheumatoid arthritis)      Past Surgical History:   Procedure Laterality Date    CARDIAC SURGERY  04/10/2015    Heart Surgery    CARDIAC SURGERY      AGE 7    CHOLECYSTECTOMY  04/10/2015    Cholecystectomy    CHOLECYSTECTOMY  02/21/2003    OTHER SURGICAL HISTORY  04/10/2015    Endotracheal Tube Insertion    WISDOM TOOTH EXTRACTION      2     Family History   Problem Relation Name Age of Onset    Brain Aneurysm Mother      Heart attack Father  66    Atrial fibrillation Father      Diabetes Father      Other (brain tumor- fluid on brain) Sister      Multiple sclerosis Sister      Anxiety disorder Brother      Heart disease Mother's Brother      Heart disease Father's Brother      No Known Problems Maternal Grandmother      Cancer Maternal Grandfather      Cancer Paternal Grandmother      Emphysema Paternal Grandfather      Lupus Cousin      Crohn's disease Cousin       Osteoporosis Other Grandmother         age related    Arthritis Other Grandmother     Hypertension Other Grandmother     Lupus Other       Social History     Tobacco Use    Smoking status: Never     Passive exposure: Never    Smokeless tobacco: Never   Vaping Use    Vaping status: Never Used   Substance Use Topics    Alcohol use: Never    Drug use: Never       Physical Exam   ED Triage Vitals   Temp Pulse Resp BP   -- -- -- --      SpO2 Temp src Heart Rate Source Patient Position   -- -- -- --      BP Location FiO2 (%)     -- --       Physical Exam  Vitals and nursing note reviewed.   Constitutional:       Appearance: She is well-developed.      Comments: Awake, sitting in examination chair   HENT:      Head: Normocephalic.      Comments: Mild occipital hematoma     Nose: Nose normal.      Mouth/Throat:      Mouth: Mucous membranes are moist.      Pharynx: Oropharynx is clear.   Eyes:      Extraocular Movements: Extraocular movements intact.      Conjunctiva/sclera: Conjunctivae normal.      Pupils: Pupils are equal, round, and reactive to light.   Cardiovascular:      Rate and Rhythm: Normal rate and regular rhythm.      Heart sounds: No murmur heard.  Pulmonary:      Effort: Pulmonary effort is normal. No respiratory distress.      Breath sounds: Normal breath sounds.   Abdominal:      General: Abdomen is flat.      Palpations: Abdomen is soft.      Tenderness: There is no abdominal tenderness.   Musculoskeletal:         General: No swelling. Normal range of motion.      Cervical back: Normal range of motion and neck supple.   Skin:     General: Skin is warm and dry.      Capillary Refill: Capillary refill takes less than 2 seconds.   Neurological:      General: No focal deficit present.      Mental Status: She is alert and oriented to person, place, and time.   Psychiatric:         Mood and Affect: Mood normal.         Behavior: Behavior normal.           ED Course & MDM   Diagnoses as of 10/26/24 1939   Fall,  initial encounter   Closed head injury, initial encounter                 No data recorded     [place] Coma Scale Score: 15 (10/26/24 1804 : Magdi Yepez RN)                           Medical Decision Making  Patient is a 48-year-old female with past medical history of lymphedema presenting via EMS after a fall at work.  CT head ordered.  Conditions considered include but are not limited to: Contusion, fracture, intracranial hemorrhage.    CT head without acute intracranial pathology.  I believe this patient is at low risk for complication, and a disposition of discharge is acceptable.  Return to the Emergency Department if new or worsening symptoms including headache, fever, chills, chest pain, shortness of breath, syncope, near syncope, abdominal pain, nausea, vomiting,  diarrhea, or worsening pain.  Educated patient to reduce eyestrain as tolerated.  Encourage patient to rest and use over-the-counter medication for headache control.  Patient states that the Tylenol did help with her headache.  She is agreeable to disposition of discharge with follow-up with primary care in the next several days.    Portions of this note made with Dragon software, please be mindful of potential grammatical errors.        Medications   acetaminophen (Tylenol) tablet 975 mg (975 mg oral Given 10/26/24 1807)         CT head wo IV contrast   Final Result   No evidence of acute intracranial hemorrhage or calvarial fracture.                       MACRO:   None        Signed by: Jose Reynolds 10/26/2024 6:53 PM   Dictation workstation:   [id number]            Procedure  Procedures     Mir Ibrahim PA-C  10/26/24 1939

## 2024-10-26 NOTE — ED TRIAGE NOTES
Fall at work hit her head, No LOC, No Blood Thinners. Does have a hematoma to the back of the head

## 2024-11-11 ENCOUNTER — APPOINTMENT (OUTPATIENT)
Dept: PHYSICAL THERAPY | Facility: CLINIC | Age: 49
End: 2024-11-11
Payer: COMMERCIAL

## 2024-12-02 ENCOUNTER — OFFICE VISIT (OUTPATIENT)
Dept: WOUND CARE | Facility: HOSPITAL | Age: 49
End: 2024-12-02
Payer: COMMERCIAL

## 2024-12-02 PROCEDURE — 97602 WOUND(S) CARE NON-SELECTIVE: CPT

## 2024-12-02 PROCEDURE — 99213 OFFICE O/P EST LOW 20 MIN: CPT

## 2024-12-16 ENCOUNTER — OFFICE VISIT (OUTPATIENT)
Dept: WOUND CARE | Facility: HOSPITAL | Age: 49
End: 2024-12-16
Payer: COMMERCIAL

## 2024-12-16 PROCEDURE — 97602 WOUND(S) CARE NON-SELECTIVE: CPT

## 2024-12-23 ENCOUNTER — APPOINTMENT (OUTPATIENT)
Dept: WOUND CARE | Facility: HOSPITAL | Age: 49
End: 2024-12-23
Payer: COMMERCIAL

## 2025-01-28 ENCOUNTER — APPOINTMENT (OUTPATIENT)
Dept: RADIOLOGY | Facility: HOSPITAL | Age: 50
End: 2025-01-28
Payer: COMMERCIAL

## 2025-01-28 ENCOUNTER — HOSPITAL ENCOUNTER (INPATIENT)
Facility: HOSPITAL | Age: 50
End: 2025-01-28
Attending: STUDENT IN AN ORGANIZED HEALTH CARE EDUCATION/TRAINING PROGRAM | Admitting: INTERNAL MEDICINE
Payer: COMMERCIAL

## 2025-01-28 DIAGNOSIS — L03.119 CELLULITIS OF LOWER EXTREMITY, UNSPECIFIED LATERALITY: ICD-10-CM

## 2025-01-28 DIAGNOSIS — L03.115 CELLULITIS OF RIGHT LOWER EXTREMITY: Primary | ICD-10-CM

## 2025-01-28 DIAGNOSIS — R65.10 SIRS (SYSTEMIC INFLAMMATORY RESPONSE SYNDROME) (MULTI): ICD-10-CM

## 2025-01-28 DIAGNOSIS — M06.09 SERONEGATIVE RHEUMATOID ARTHRITIS OF MULTIPLE SITES (MULTI): ICD-10-CM

## 2025-01-28 LAB
ALBUMIN SERPL BCP-MCNC: 3.6 G/DL (ref 3.4–5)
ALP SERPL-CCNC: 87 U/L (ref 33–110)
ALT SERPL W P-5'-P-CCNC: 15 U/L (ref 7–45)
ANION GAP SERPL CALCULATED.3IONS-SCNC: 12 MMOL/L (ref 10–20)
AST SERPL W P-5'-P-CCNC: 30 U/L (ref 9–39)
BASOPHILS # BLD AUTO: 0.09 X10*3/UL (ref 0–0.1)
BASOPHILS NFR BLD AUTO: 0.4 %
BILIRUB SERPL-MCNC: 0.7 MG/DL (ref 0–1.2)
BUN SERPL-MCNC: 11 MG/DL (ref 6–23)
CALCIUM SERPL-MCNC: 9 MG/DL (ref 8.6–10.3)
CHLORIDE SERPL-SCNC: 97 MMOL/L (ref 98–107)
CO2 SERPL-SCNC: 27 MMOL/L (ref 21–32)
CREAT SERPL-MCNC: 0.51 MG/DL (ref 0.5–1.05)
EGFRCR SERPLBLD CKD-EPI 2021: >90 ML/MIN/1.73M*2
EOSINOPHIL # BLD AUTO: 0.01 X10*3/UL (ref 0–0.7)
EOSINOPHIL NFR BLD AUTO: 0 %
ERYTHROCYTE [DISTWIDTH] IN BLOOD BY AUTOMATED COUNT: 15.3 % (ref 11.5–14.5)
FLUAV RNA RESP QL NAA+PROBE: NOT DETECTED
FLUBV RNA RESP QL NAA+PROBE: NOT DETECTED
GLUCOSE SERPL-MCNC: 122 MG/DL (ref 74–99)
HCT VFR BLD AUTO: 38.8 % (ref 36–46)
HGB BLD-MCNC: 12 G/DL (ref 12–16)
IMM GRANULOCYTES # BLD AUTO: 0.67 X10*3/UL (ref 0–0.7)
IMM GRANULOCYTES NFR BLD AUTO: 2.8 % (ref 0–0.9)
LACTATE SERPL-SCNC: 2.1 MMOL/L (ref 0.4–2)
LYMPHOCYTES # BLD AUTO: 0.73 X10*3/UL (ref 1.2–4.8)
LYMPHOCYTES NFR BLD AUTO: 3.1 %
MCH RBC QN AUTO: 28.4 PG (ref 26–34)
MCHC RBC AUTO-ENTMCNC: 30.9 G/DL (ref 32–36)
MCV RBC AUTO: 92 FL (ref 80–100)
MONOCYTES # BLD AUTO: 0.95 X10*3/UL (ref 0.1–1)
MONOCYTES NFR BLD AUTO: 4 %
NEUTROPHILS # BLD AUTO: 21.18 X10*3/UL (ref 1.2–7.7)
NEUTROPHILS NFR BLD AUTO: 89.7 %
NRBC BLD-RTO: 0.1 /100 WBCS (ref 0–0)
PLATELET # BLD AUTO: 231 X10*3/UL (ref 150–450)
POTASSIUM SERPL-SCNC: 3.9 MMOL/L (ref 3.5–5.3)
PROT SERPL-MCNC: 8.1 G/DL (ref 6.4–8.2)
RBC # BLD AUTO: 4.23 X10*6/UL (ref 4–5.2)
SARS-COV-2 RNA RESP QL NAA+PROBE: NOT DETECTED
SODIUM SERPL-SCNC: 132 MMOL/L (ref 136–145)
WBC # BLD AUTO: 23.6 X10*3/UL (ref 4.4–11.3)

## 2025-01-28 PROCEDURE — 71045 X-RAY EXAM CHEST 1 VIEW: CPT

## 2025-01-28 PROCEDURE — 96361 HYDRATE IV INFUSION ADD-ON: CPT

## 2025-01-28 PROCEDURE — 87636 SARSCOV2 & INF A&B AMP PRB: CPT | Performed by: NURSE PRACTITIONER

## 2025-01-28 PROCEDURE — 36415 COLL VENOUS BLD VENIPUNCTURE: CPT | Performed by: NURSE PRACTITIONER

## 2025-01-28 PROCEDURE — 71045 X-RAY EXAM CHEST 1 VIEW: CPT | Performed by: STUDENT IN AN ORGANIZED HEALTH CARE EDUCATION/TRAINING PROGRAM

## 2025-01-28 PROCEDURE — 96366 THER/PROPH/DIAG IV INF ADDON: CPT

## 2025-01-28 PROCEDURE — 87040 BLOOD CULTURE FOR BACTERIA: CPT | Mod: WESLAB | Performed by: NURSE PRACTITIONER

## 2025-01-28 PROCEDURE — 99285 EMERGENCY DEPT VISIT HI MDM: CPT | Mod: 25 | Performed by: STUDENT IN AN ORGANIZED HEALTH CARE EDUCATION/TRAINING PROGRAM

## 2025-01-28 PROCEDURE — 80053 COMPREHEN METABOLIC PANEL: CPT | Performed by: NURSE PRACTITIONER

## 2025-01-28 PROCEDURE — 2500000004 HC RX 250 GENERAL PHARMACY W/ HCPCS (ALT 636 FOR OP/ED): Performed by: NURSE PRACTITIONER

## 2025-01-28 PROCEDURE — 96365 THER/PROPH/DIAG IV INF INIT: CPT

## 2025-01-28 PROCEDURE — 83605 ASSAY OF LACTIC ACID: CPT | Performed by: NURSE PRACTITIONER

## 2025-01-28 PROCEDURE — 85025 COMPLETE CBC W/AUTO DIFF WBC: CPT | Performed by: NURSE PRACTITIONER

## 2025-01-28 RX ORDER — VANCOMYCIN 2 G/400ML
2 INJECTION, SOLUTION INTRAVENOUS ONCE
Status: COMPLETED | OUTPATIENT
Start: 2025-01-28 | End: 2025-01-28

## 2025-01-28 RX ADMIN — PIPERACILLIN SODIUM AND TAZOBACTAM SODIUM 4.5 G: 4; .5 INJECTION, SOLUTION INTRAVENOUS at 18:46

## 2025-01-28 RX ADMIN — SODIUM CHLORIDE 500 ML: 900 INJECTION, SOLUTION INTRAVENOUS at 18:53

## 2025-01-28 RX ADMIN — VANCOMYCIN 2 G: 2 INJECTION, SOLUTION INTRAVENOUS at 18:53

## 2025-01-28 ASSESSMENT — LIFESTYLE VARIABLES
EVER HAD A DRINK FIRST THING IN THE MORNING TO STEADY YOUR NERVES TO GET RID OF A HANGOVER: NO
TOTAL SCORE: 0
HAVE PEOPLE ANNOYED YOU BY CRITICIZING YOUR DRINKING: NO
EVER FELT BAD OR GUILTY ABOUT YOUR DRINKING: NO
HAVE YOU EVER FELT YOU SHOULD CUT DOWN ON YOUR DRINKING: NO

## 2025-01-28 ASSESSMENT — PAIN SCALES - GENERAL: PAINLEVEL_OUTOF10: 0 - NO PAIN

## 2025-01-28 ASSESSMENT — COLUMBIA-SUICIDE SEVERITY RATING SCALE - C-SSRS
6. HAVE YOU EVER DONE ANYTHING, STARTED TO DO ANYTHING, OR PREPARED TO DO ANYTHING TO END YOUR LIFE?: NO
1. IN THE PAST MONTH, HAVE YOU WISHED YOU WERE DEAD OR WISHED YOU COULD GO TO SLEEP AND NOT WAKE UP?: NO
2. HAVE YOU ACTUALLY HAD ANY THOUGHTS OF KILLING YOURSELF?: NO

## 2025-01-28 NOTE — ED TRIAGE NOTES
Pt here today for elevated temp. Pts temp is 36.3 in triage. Pt sts she feels hot then cold. Pt has no other complaints at this time. Pt ambulatory to triage

## 2025-01-28 NOTE — LETTER
February 6, 2025     Patient: Roro Marshall   YOB: 1975   Date of Visit: 1/28/2025       To Whom It May Concern:    Roro Marshall was admitted to [facility] on 1/28/2025. She is being discharged today to a skilled nursing facility for continuation and completion of her care.  She will be able to return to work after leaving the facility.  The final date of completion of care will be determined at the facility.    If you have any questions or concerns, please don't hesitate to call.         Sincerely,             Ynes Miramontes MD  Department of Medicine        CC: No Recipients

## 2025-01-28 NOTE — ED PROVIDER NOTES
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7    Hx of rheumatic fever     CHILDHOOD    Hyperlipidemia     Hypothyroid 2004    Lower extremity edema     Lupus 2004    Osteoporosis     Plantar fasciitis     Pneumonia     RA (rheumatoid arthritis)      Past Surgical History:   Procedure Laterality Date    CARDIAC SURGERY  04/10/2015    Heart Surgery    CARDIAC SURGERY      AGE 7    CHOLECYSTECTOMY  04/10/2015    Cholecystectomy    CHOLECYSTECTOMY  02/21/2003    OTHER SURGICAL HISTORY  04/10/2015    Endotracheal Tube Insertion    WISDOM TOOTH EXTRACTION      2     Family History   Problem Relation Name Age of Onset    Brain Aneurysm Mother      Heart attack Father  66    Atrial fibrillation Father      Diabetes Father      Other (brain tumor- fluid on brain) Sister      Multiple sclerosis Sister      Anxiety disorder Brother      Heart disease Mother's Brother      Heart disease Father's Brother      No Known Problems Maternal Grandmother      Cancer Maternal Grandfather      Cancer Paternal Grandmother      Emphysema Paternal Grandfather      Lupus Cousin      Crohn's disease Cousin      Osteoporosis Other Grandmother         age related    Arthritis Other Grandmother     Hypertension Other Grandmother     Lupus Other       Social History     Tobacco Use    Smoking status: Never     Passive exposure: Never    Smokeless tobacco: Never   Vaping Use    Vaping status: Never Used   Substance Use Topics    Alcohol use: Never    Drug use: Never       Physical Exam   ED Triage Vitals [01/28/25 1713]   Temperature Heart Rate Respirations BP   36.5 °C (97.7 °F) (!) 122 20 144/83      Pulse Ox Temp Source Heart Rate  Source Patient Position   99 % Oral Monitor Sitting      BP Location FiO2 (%)     Left arm --       Physical Exam  CONSTITUTIONAL: Vital signs reviewed as charted, well-developed and in no distress  Eyes: Extraocular muscles are intact. Pupils equal round and reactive to light. Conjunctiva are pink.    ENT: Mucous membranes are moist. Tongue in the midline. Pharynx was without erythema or exudates, uvula midline  LUNGS: Breath sounds equal and clear to auscultation. Good air exchange, no wheezes rales or retractions, pulse oximetry is charted.  HEART: Regular rate and rhythm without murmur thrill or rub, strong tones, auscultation is normal.  ABDOMEN: Soft and nontender without guarding rebound rigidity or mass. Bowel sounds are present and normal in all quadrants. There is no palpable masses or aneurysms identified. No hepatosplenomegaly, normal abdominal exam.  Neuro: The patient is awake, alert and oriented ×3. Moving all 4 extremities and answering questions appropriately.   MUSCULOSKELETAL: The calves are nontender to palpation. Full gross active range of motion.   PSYCH: Awake alert oriented, normal mood and affect.  Skin:  Dry, normal color, warm to the touch, no rash present.  Extensive lymphedema to both lower legs.  The distal two thirds of both lower legs are red and do have weeping edema.  It is not foul-smelling.  Motor sensation pulses are intact distally.  Cap refill less than 3 seconds      ED Course & MDM   ED Course as of 01/28/25 2238   Tue Jan 28, 2025   1850 I was informed by care management the patient's insurance we are out of network and they are recommending transferring to [facility] as they are in network.  Call was placed to [facility] at this time. [RJ]   2238 Care was transferred to Dr. Marcus Burns pending callback from [facility] [RJ]      ED Course User Index  [RJ] Stas Jarvis, APRN-CNP         Diagnoses as of 01/28/25 2238   Cellulitis of lower extremity, unspecified laterality    SIRS (systemic inflammatory response syndrome) (Multi)                 No data recorded     [place] Coma Scale Score: 15 (01/28/25 1852 : Arleth Flor RN)                           Medical Decision Making  History obtained from: patient    Vital signs, nursing notes, current medications, past medical history, Surgical history, allergies, social history, family History were reviewed.         HPI:  Patient 49-year-old female history of lymphedema presenting emergency room today complaining of hot and cold spells.  Patient fever 102 at home.  She is afebrile on arrival but is tachycardic.  She does have an appointment at the wound clinic for her weeping legs later in the week.  She is nontoxic-appearing vital signs are positive for tachycardia on arrival      10 point ROS was reviewed and negative except Noted above in HPI.  DDX: as listed above          MDM Summary/considerations:  Labs Reviewed   CBC WITH AUTO DIFFERENTIAL - Abnormal       Result Value    WBC 23.6 (*)     nRBC 0.1 (*)     RBC 4.23      Hemoglobin 12.0      Hematocrit 38.8      MCV 92      MCH 28.4      MCHC 30.9 (*)     RDW 15.3 (*)     Platelets 231      Neutrophils % 89.7      Immature Granulocytes %, Automated 2.8 (*)     Lymphocytes % 3.1      Monocytes % 4.0      Eosinophils % 0.0      Basophils % 0.4      Neutrophils Absolute 21.18 (*)     Immature Granulocytes Absolute, Automated 0.67      Lymphocytes Absolute 0.73 (*)     Monocytes Absolute 0.95      Eosinophils Absolute 0.01      Basophils Absolute 0.09     COMPREHENSIVE METABOLIC PANEL - Abnormal    Glucose 122 (*)     Sodium 132 (*)     Potassium 3.9      Chloride 97 (*)     Bicarbonate 27      Anion Gap 12      Urea Nitrogen 11      Creatinine 0.51      eGFR >90      Calcium 9.0      Albumin 3.6      Alkaline Phosphatase 87      Total Protein 8.1      AST 30      Bilirubin, Total 0.7      ALT 15     LACTATE - Abnormal    Lactate 2.1 (*)     Narrative:     Venipuncture immediately  after or during the administration of Metamizole may lead to falsely low results. Testing should be performed immediately prior to Metamizole dosing.   SARS-COV-2 PCR - Normal    Coronavirus 2019, PCR Not Detected      Narrative:     This assay is an FDA-cleared, in vitro diagnostic nucleic acid amplification test for the qualitative detection and differentiation of SARS CoV-2 from nasopharyngeal specimens collected from individuals with signs and symptoms of respiratory tract infections, and has been validated for use at [facility]. Negative results do not preclude COVID-19 infections and should not be used as the sole basis for diagnosis, treatment, or other management decisions. Testing for SARS CoV-2 is recommended only for patients who meet current clinical and/or epidemiological criteria defined by federal, state, or local public health directives.   INFLUENZA A AND B PCR - Normal    Flu A Result Not Detected      Flu B Result Not Detected      Narrative:     This assay is an in vitro diagnostic multiplex nucleic acid amplification test for the detection and discrimination of Influenza A & B from nasopharyngeal specimens, and has been validated for use at [facility]. Negative results do not preclude Influenza A/B infections, and should not be used as the sole basis for diagnosis, treatment, or other management decisions. If Influenza A/B and RSV PCR results are negative, testing for Parainfluenza virus, Adenovirus and Metapneumovirus is routinely performed for [facility] pediatric oncology and intensive care inpatients, and is available on other patients by placing an add-on request.   BLOOD CULTURE   BLOOD CULTURE   URINALYSIS WITH REFLEX CULTURE AND MICROSCOPIC    Narrative:     The following orders were created for panel order Urinalysis with Reflex Culture and Microscopic.  Procedure                               Abnormality         Status                      ---------                               -----------         ------                     Urinalysis with Reflex C...[[id number]]                                                 Extra Urine Gray Tube[[id number]]                                                         Please view results for these tests on the individual orders.   URINALYSIS WITH REFLEX CULTURE AND MICROSCOPIC   EXTRA URINE GRAY TUBE   LACTATE     XR chest 1 view   Final Result   1.  Mild enlargement of the cardiomediastinal silhouette is pulmonary   vascular congestion and trace fluid along the fissure in the right   lung. No consolidation or sizable pleural effusion is evident.   Correlate with fluid volume status.                  MACRO:   None        Signed by: Kyle Villarreal 1/28/2025 7:05 PM   Dictation workstation:   [id number]        Medications   piperacillin-tazobactam (Zosyn) 4.5 g in dextrose (iso)  mL (0 g intravenous Stopped 1/28/25 1915)   vancomycin (Xellia) 2 g in diluent combination  mL (0 g intravenous Stopped 1/28/25 2053)   sodium chloride 0.9 % bolus 500 mL (0 mL intravenous Stopped 1/28/25 1953)     New Prescriptions    No medications on file     Patient noted to have leukocytosis of 25,000, initially was tachycardic and has resolved.  Initial lactic acid 2.1 repeat pending.  Hyponatremic at 132 x-ray does show some mild pulmonary vascular congestion.  Venous reason the full 30 mL/kg fluid bolus was not given.  Patient was started on IV antibiotics after blood cultures were drawn.  She was admitted for further evaluation and care.      I saw this patient in conjunction with Dr. Mcgrath, please see her supervision note.      SEP-1 CORE MEASURE DATA    Visit Vitals  /83 (BP Location: Left arm, Patient Position: Sitting)   Pulse (!) 122   Temp 36.5 °C (97.7 °F) (Oral)   Resp 20        WBC   Date/Time Value Ref Range Status   01/28/2025 06:00 PM 23.6 (H) 4.4 - 11.3 x10*3/uL Final     Lactate   Date/Time Value  Ref Range Status   01/28/2025 06:00 PM 2.1 (H) 0.4 - 2.0 mmol/L Final     Creatinine   Date/Time Value Ref Range Status   01/28/2025 06:00 PM 0.51 0.50 - 1.05 mg/dL Final     Bilirubin, Total   Date/Time Value Ref Range Status   01/28/2025 06:00 PM 0.7 0.0 - 1.2 mg/dL Final     INR   Date/Time Value Ref Range Status   07/18/2020 09:48 AM 1.0 0.86 - 1.16 Final     Comment:     INR Theraputic Range:  2.0-3.5     Platelets   Date/Time Value Ref Range Status   01/28/2025 06:00  150 - 450 x10*3/uL Final        Fluid Resuscitation Rationale: Due to  Concern for Fluid Overload, ordered less than 30mL/kg actual body weight. Actual fluid amount given: 500mL    I performed a sepsis reperfusion exam on Roro Boonay on 01/28/25 at 2030                                                        Critical Care:        This chart was completed using voice recognition transcription software. Please excuse any errors of transcription including grammatical, punctuation, syntax and spelling errors.  Please contact me with any questions regarding this chart.    Procedure  Procedures     LESLEE Jarquin-EDGAR  01/28/25 2239       LESLEE Jarquin-EDGAR  01/28/25 2247

## 2025-01-28 NOTE — CONSULTS
Vancomycin Dosing by Pharmacy- EMERGENCY DEPARTMENT    Roro Marshall is a 49 y.o. year old female who Pharmacy has been consulted to give a ONE TIME ONLY vancomycin dose in the Emergency Department for cellulitis, skin and soft tissue.     Visit Vitals  /83 (BP Location: Left arm, Patient Position: Sitting)   Pulse (!) 122   Temp 36.5 °C (97.7 °F) (Oral)   Resp 20        Lab Results   Component Value Date    CREATININE 0.40 03/12/2024    CREATININE 0.5 09/09/2023    CREATININE 0.5 04/12/2023    CREATININE 0.4 11/14/2022    CREATININE 0.4 11/14/2022        Patient weight is   Wt Readings from Last 1 Encounters:   01/28/25 145 kg (320 lb)        Assessment/Plan     Patient will be given a one time dose of 2000 mg  Pharmacy will sign off at this time. If vancomycin is to be continued, please re-consult Pharmacy.       Estrada Mcdaniel, PharmD

## 2025-01-29 PROBLEM — L03.119 CELLULITIS OF LOWER EXTREMITY, UNSPECIFIED LATERALITY: Status: ACTIVE | Noted: 2025-01-29

## 2025-01-29 PROBLEM — M06.09 RHEUMATOID ARTHRITIS OF MULTIPLE SITES WITH NEGATIVE RHEUMATOID FACTOR (MULTI): Status: ACTIVE | Noted: 2023-09-15

## 2025-01-29 PROBLEM — A41.9 SEPSIS (MULTI): Status: ACTIVE | Noted: 2025-01-29

## 2025-01-29 LAB
CK SERPL-CCNC: 651 U/L (ref 0–215)
LACTATE SERPL-SCNC: 0.9 MMOL/L (ref 0.4–2)

## 2025-01-29 PROCEDURE — 97162 PT EVAL MOD COMPLEX 30 MIN: CPT | Mod: GP

## 2025-01-29 PROCEDURE — 36415 COLL VENOUS BLD VENIPUNCTURE: CPT | Performed by: NURSE PRACTITIONER

## 2025-01-29 PROCEDURE — 82550 ASSAY OF CK (CPK): CPT | Performed by: NURSE PRACTITIONER

## 2025-01-29 PROCEDURE — 2500000004 HC RX 250 GENERAL PHARMACY W/ HCPCS (ALT 636 FOR OP/ED): Performed by: STUDENT IN AN ORGANIZED HEALTH CARE EDUCATION/TRAINING PROGRAM

## 2025-01-29 PROCEDURE — 2500000004 HC RX 250 GENERAL PHARMACY W/ HCPCS (ALT 636 FOR OP/ED): Performed by: INTERNAL MEDICINE

## 2025-01-29 PROCEDURE — 83605 ASSAY OF LACTIC ACID: CPT | Performed by: NURSE PRACTITIONER

## 2025-01-29 PROCEDURE — 97166 OT EVAL MOD COMPLEX 45 MIN: CPT | Mod: GO

## 2025-01-29 PROCEDURE — 87077 CULTURE AEROBIC IDENTIFY: CPT | Mod: WESLAB | Performed by: NURSE PRACTITIONER

## 2025-01-29 PROCEDURE — 2500000004 HC RX 250 GENERAL PHARMACY W/ HCPCS (ALT 636 FOR OP/ED)

## 2025-01-29 PROCEDURE — 2500000004 HC RX 250 GENERAL PHARMACY W/ HCPCS (ALT 636 FOR OP/ED): Performed by: NURSE PRACTITIONER

## 2025-01-29 PROCEDURE — 2500000001 HC RX 250 WO HCPCS SELF ADMINISTERED DRUGS (ALT 637 FOR MEDICARE OP): Performed by: INTERNAL MEDICINE

## 2025-01-29 PROCEDURE — 99223 1ST HOSP IP/OBS HIGH 75: CPT | Performed by: INTERNAL MEDICINE

## 2025-01-29 PROCEDURE — 1210000001 HC SEMI-PRIVATE ROOM DAILY

## 2025-01-29 PROCEDURE — 96366 THER/PROPH/DIAG IV INF ADDON: CPT

## 2025-01-29 PROCEDURE — 2500000002 HC RX 250 W HCPCS SELF ADMINISTERED DRUGS (ALT 637 FOR MEDICARE OP, ALT 636 FOR OP/ED): Performed by: INTERNAL MEDICINE

## 2025-01-29 RX ORDER — DAPTOMYCIN IN SODIUM CHLORIDE 500 MG/50ML
6 INJECTION, SOLUTION INTRAVENOUS EVERY 24 HOURS
Status: DISCONTINUED | OUTPATIENT
Start: 2025-01-29 | End: 2025-01-30

## 2025-01-29 RX ORDER — ONDANSETRON HYDROCHLORIDE 2 MG/ML
4 INJECTION, SOLUTION INTRAVENOUS EVERY 8 HOURS PRN
Status: DISCONTINUED | OUTPATIENT
Start: 2025-01-29 | End: 2025-02-06 | Stop reason: HOSPADM

## 2025-01-29 RX ORDER — HYDROXYCHLOROQUINE SULFATE 200 MG/1
200 TABLET, FILM COATED ORAL DAILY
Status: DISCONTINUED | OUTPATIENT
Start: 2025-01-30 | End: 2025-02-06 | Stop reason: HOSPADM

## 2025-01-29 RX ORDER — HYDROXYCHLOROQUINE SULFATE 200 MG/1
200 TABLET, FILM COATED ORAL 2 TIMES DAILY
Status: DISCONTINUED | OUTPATIENT
Start: 2025-01-29 | End: 2025-01-29

## 2025-01-29 RX ORDER — ACETAMINOPHEN 650 MG/1
650 SUPPOSITORY RECTAL EVERY 4 HOURS PRN
Status: DISCONTINUED | OUTPATIENT
Start: 2025-01-29 | End: 2025-02-06 | Stop reason: HOSPADM

## 2025-01-29 RX ORDER — ACETAMINOPHEN 160 MG/5ML
650 SOLUTION ORAL EVERY 4 HOURS PRN
Status: DISCONTINUED | OUTPATIENT
Start: 2025-01-29 | End: 2025-02-06 | Stop reason: HOSPADM

## 2025-01-29 RX ORDER — ACETAMINOPHEN 500 MG
5 TABLET ORAL NIGHTLY PRN
Status: DISCONTINUED | OUTPATIENT
Start: 2025-01-29 | End: 2025-02-06 | Stop reason: HOSPADM

## 2025-01-29 RX ORDER — VANCOMYCIN HYDROCHLORIDE 1 G/20ML
INJECTION, POWDER, LYOPHILIZED, FOR SOLUTION INTRAVENOUS DAILY PRN
Status: DISCONTINUED | OUTPATIENT
Start: 2025-01-29 | End: 2025-01-29

## 2025-01-29 RX ORDER — VANCOMYCIN 2 G/400ML
2 INJECTION, SOLUTION INTRAVENOUS EVERY 8 HOURS
Status: DISCONTINUED | OUTPATIENT
Start: 2025-01-29 | End: 2025-01-29

## 2025-01-29 RX ORDER — ACETAMINOPHEN 325 MG/1
650 TABLET ORAL EVERY 4 HOURS PRN
Status: DISCONTINUED | OUTPATIENT
Start: 2025-01-29 | End: 2025-02-06 | Stop reason: HOSPADM

## 2025-01-29 RX ORDER — ONDANSETRON 4 MG/1
4 TABLET, ORALLY DISINTEGRATING ORAL EVERY 8 HOURS PRN
Status: DISCONTINUED | OUTPATIENT
Start: 2025-01-29 | End: 2025-02-06 | Stop reason: HOSPADM

## 2025-01-29 RX ORDER — ENOXAPARIN SODIUM 100 MG/ML
60 INJECTION SUBCUTANEOUS EVERY 12 HOURS SCHEDULED
Status: DISCONTINUED | OUTPATIENT
Start: 2025-01-29 | End: 2025-02-06 | Stop reason: HOSPADM

## 2025-01-29 RX ORDER — DEXTROMETHORPHAN HYDROBROMIDE, GUAIFENESIN 5; 100 MG/5ML; MG/5ML
1300 LIQUID ORAL EVERY 8 HOURS PRN
COMMUNITY

## 2025-01-29 RX ORDER — SODIUM CHLORIDE 9 MG/ML
100 INJECTION, SOLUTION INTRAVENOUS CONTINUOUS
Status: ACTIVE | OUTPATIENT
Start: 2025-01-29 | End: 2025-01-30

## 2025-01-29 RX ADMIN — ENOXAPARIN SODIUM 60 MG: 60 INJECTION SUBCUTANEOUS at 05:58

## 2025-01-29 RX ADMIN — SODIUM CHLORIDE 100 ML/HR: 900 INJECTION, SOLUTION INTRAVENOUS at 05:51

## 2025-01-29 RX ADMIN — HYDROXYCHLOROQUINE SULFATE 200 MG: 200 TABLET ORAL at 09:18

## 2025-01-29 RX ADMIN — ENOXAPARIN SODIUM 60 MG: 60 INJECTION SUBCUTANEOUS at 18:17

## 2025-01-29 RX ADMIN — PIPERACILLIN SODIUM AND TAZOBACTAM SODIUM 3.38 G: 3; .375 INJECTION, SOLUTION INTRAVENOUS at 03:13

## 2025-01-29 RX ADMIN — SODIUM CHLORIDE 1000 ML: 900 INJECTION, SOLUTION INTRAVENOUS at 03:13

## 2025-01-29 RX ADMIN — PIPERACILLIN AND TAZOBACTAM 4.5 G: 4; .5 INJECTION, POWDER, FOR SOLUTION INTRAVENOUS at 15:31

## 2025-01-29 RX ADMIN — DAPTOMYCIN IN SODIUM CHLORIDE 500 MG: 500 INJECTION, SOLUTION INTRAVENOUS at 13:20

## 2025-01-29 RX ADMIN — PIPERACILLIN AND TAZOBACTAM 4.5 G: 4; .5 INJECTION, POWDER, FOR SOLUTION INTRAVENOUS at 22:08

## 2025-01-29 RX ADMIN — SODIUM CHLORIDE 100 ML/HR: 900 INJECTION, SOLUTION INTRAVENOUS at 15:28

## 2025-01-29 RX ADMIN — PIPERACILLIN SODIUM AND TAZOBACTAM SODIUM 4.5 G: 4; .5 INJECTION, SOLUTION INTRAVENOUS at 09:19

## 2025-01-29 RX ADMIN — VANCOMYCIN 2 G: 2 INJECTION, SOLUTION INTRAVENOUS at 05:50

## 2025-01-29 RX ADMIN — LEVOTHYROXINE SODIUM 137 MCG: 0.14 TABLET ORAL at 05:59

## 2025-01-29 SDOH — SOCIAL STABILITY: SOCIAL INSECURITY: ABUSE: ADULT

## 2025-01-29 SDOH — SOCIAL STABILITY: SOCIAL INSECURITY: DO YOU FEEL ANYONE HAS EXPLOITED OR TAKEN ADVANTAGE OF YOU FINANCIALLY OR OF YOUR PERSONAL PROPERTY?: NO

## 2025-01-29 SDOH — SOCIAL STABILITY: SOCIAL NETWORK
IN A TYPICAL WEEK, HOW MANY TIMES DO YOU TALK ON THE PHONE WITH FAMILY, FRIENDS, OR NEIGHBORS?: MORE THAN THREE TIMES A WEEK

## 2025-01-29 SDOH — ECONOMIC STABILITY: FOOD INSECURITY: WITHIN THE PAST 12 MONTHS, YOU WORRIED THAT YOUR FOOD WOULD RUN OUT BEFORE YOU GOT THE MONEY TO BUY MORE.: NEVER TRUE

## 2025-01-29 SDOH — ECONOMIC STABILITY: INCOME INSECURITY: IN THE PAST 12 MONTHS HAS THE ELECTRIC, GAS, OIL, OR WATER COMPANY THREATENED TO SHUT OFF SERVICES IN YOUR HOME?: NO

## 2025-01-29 SDOH — HEALTH STABILITY: MENTAL HEALTH: HOW OFTEN DO YOU HAVE A DRINK CONTAINING ALCOHOL?: NEVER

## 2025-01-29 SDOH — ECONOMIC STABILITY: FOOD INSECURITY: WITHIN THE PAST 12 MONTHS, THE FOOD YOU BOUGHT JUST DIDN'T LAST AND YOU DIDN'T HAVE MONEY TO GET MORE.: NEVER TRUE

## 2025-01-29 SDOH — HEALTH STABILITY: PHYSICAL HEALTH
HOW OFTEN DO YOU NEED TO HAVE SOMEONE HELP YOU WHEN YOU READ INSTRUCTIONS, PAMPHLETS, OR OTHER WRITTEN MATERIAL FROM YOUR DOCTOR OR PHARMACY?: NEVER

## 2025-01-29 SDOH — SOCIAL STABILITY: SOCIAL INSECURITY: WITHIN THE LAST YEAR, HAVE YOU BEEN AFRAID OF YOUR PARTNER OR EX-PARTNER?: NO

## 2025-01-29 SDOH — SOCIAL STABILITY: SOCIAL NETWORK
DO YOU BELONG TO ANY CLUBS OR ORGANIZATIONS SUCH AS CHURCH GROUPS, UNIONS, FRATERNAL OR ATHLETIC GROUPS, OR SCHOOL GROUPS?: NO

## 2025-01-29 SDOH — SOCIAL STABILITY: SOCIAL INSECURITY: WITHIN THE LAST YEAR, HAVE YOU BEEN HUMILIATED OR EMOTIONALLY ABUSED IN OTHER WAYS BY YOUR PARTNER OR EX-PARTNER?: NO

## 2025-01-29 SDOH — HEALTH STABILITY: PHYSICAL HEALTH: ON AVERAGE, HOW MANY DAYS PER WEEK DO YOU ENGAGE IN MODERATE TO STRENUOUS EXERCISE (LIKE A BRISK WALK)?: 0 DAYS

## 2025-01-29 SDOH — SOCIAL STABILITY: SOCIAL INSECURITY: DO YOU FEEL UNSAFE GOING BACK TO THE PLACE WHERE YOU ARE LIVING?: NO

## 2025-01-29 SDOH — SOCIAL STABILITY: SOCIAL INSECURITY: HAVE YOU HAD THOUGHTS OF HARMING ANYONE ELSE?: NO

## 2025-01-29 SDOH — SOCIAL STABILITY: SOCIAL INSECURITY
WITHIN THE LAST YEAR, HAVE YOU BEEN KICKED, HIT, SLAPPED, OR OTHERWISE PHYSICALLY HURT BY YOUR PARTNER OR EX-PARTNER?: NO

## 2025-01-29 SDOH — HEALTH STABILITY: MENTAL HEALTH: HOW OFTEN DO YOU HAVE SIX OR MORE DRINKS ON ONE OCCASION?: NEVER

## 2025-01-29 SDOH — ECONOMIC STABILITY: HOUSING INSECURITY: AT ANY TIME IN THE PAST 12 MONTHS, WERE YOU HOMELESS OR LIVING IN A SHELTER (INCLUDING NOW)?: NO

## 2025-01-29 SDOH — SOCIAL STABILITY: SOCIAL INSECURITY: DOES ANYONE TRY TO KEEP YOU FROM HAVING/CONTACTING OTHER FRIENDS OR DOING THINGS OUTSIDE YOUR HOME?: NO

## 2025-01-29 SDOH — SOCIAL STABILITY: SOCIAL INSECURITY: HAVE YOU HAD ANY THOUGHTS OF HARMING ANYONE ELSE?: NO

## 2025-01-29 SDOH — SOCIAL STABILITY: SOCIAL NETWORK: HOW OFTEN DO YOU ATTEND CHURCH OR RELIGIOUS SERVICES?: MORE THAN 4 TIMES PER YEAR

## 2025-01-29 SDOH — SOCIAL STABILITY: SOCIAL INSECURITY: HAS ANYONE EVER THREATENED TO HURT YOUR FAMILY OR YOUR PETS?: NO

## 2025-01-29 SDOH — ECONOMIC STABILITY: HOUSING INSECURITY: IN THE LAST 12 MONTHS, WAS THERE A TIME WHEN YOU WERE NOT ABLE TO PAY THE MORTGAGE OR RENT ON TIME?: NO

## 2025-01-29 SDOH — SOCIAL STABILITY: SOCIAL INSECURITY: ARE YOU MARRIED, WIDOWED, DIVORCED, SEPARATED, NEVER MARRIED, OR LIVING WITH A PARTNER?: WIDOWED

## 2025-01-29 SDOH — SOCIAL STABILITY: SOCIAL NETWORK: HOW OFTEN DO YOU ATTEND MEETINGS OF THE CLUBS OR ORGANIZATIONS YOU BELONG TO?: NEVER

## 2025-01-29 SDOH — SOCIAL STABILITY: SOCIAL INSECURITY: ARE THERE ANY APPARENT SIGNS OF INJURIES/BEHAVIORS THAT COULD BE RELATED TO ABUSE/NEGLECT?: NO

## 2025-01-29 SDOH — HEALTH STABILITY: PHYSICAL HEALTH: ON AVERAGE, HOW MANY MINUTES DO YOU ENGAGE IN EXERCISE AT THIS LEVEL?: 0 MIN

## 2025-01-29 SDOH — HEALTH STABILITY: MENTAL HEALTH: HOW MANY DRINKS CONTAINING ALCOHOL DO YOU HAVE ON A TYPICAL DAY WHEN YOU ARE DRINKING?: PATIENT DOES NOT DRINK

## 2025-01-29 SDOH — ECONOMIC STABILITY: FOOD INSECURITY: HOW HARD IS IT FOR YOU TO PAY FOR THE VERY BASICS LIKE FOOD, HOUSING, MEDICAL CARE, AND HEATING?: NOT HARD AT ALL

## 2025-01-29 SDOH — SOCIAL STABILITY: SOCIAL INSECURITY
WITHIN THE LAST YEAR, HAVE YOU BEEN RAPED OR FORCED TO HAVE ANY KIND OF SEXUAL ACTIVITY BY YOUR PARTNER OR EX-PARTNER?: NO

## 2025-01-29 SDOH — HEALTH STABILITY: MENTAL HEALTH
DO YOU FEEL STRESS - TENSE, RESTLESS, NERVOUS, OR ANXIOUS, OR UNABLE TO SLEEP AT NIGHT BECAUSE YOUR MIND IS TROUBLED ALL THE TIME - THESE DAYS?: NOT AT ALL

## 2025-01-29 SDOH — SOCIAL STABILITY: SOCIAL INSECURITY: ARE YOU OR HAVE YOU BEEN THREATENED OR ABUSED PHYSICALLY, EMOTIONALLY, OR SEXUALLY BY ANYONE?: NO

## 2025-01-29 SDOH — ECONOMIC STABILITY: TRANSPORTATION INSECURITY: IN THE PAST 12 MONTHS, HAS LACK OF TRANSPORTATION KEPT YOU FROM MEDICAL APPOINTMENTS OR FROM GETTING MEDICATIONS?: NO

## 2025-01-29 SDOH — SOCIAL STABILITY: SOCIAL INSECURITY: WERE YOU ABLE TO COMPLETE ALL THE BEHAVIORAL HEALTH SCREENINGS?: YES

## 2025-01-29 SDOH — ECONOMIC STABILITY: HOUSING INSECURITY: IN THE PAST 12 MONTHS, HOW MANY TIMES HAVE YOU MOVED WHERE YOU WERE LIVING?: 0

## 2025-01-29 SDOH — SOCIAL STABILITY: SOCIAL NETWORK: HOW OFTEN DO YOU GET TOGETHER WITH FRIENDS OR RELATIVES?: ONCE A WEEK

## 2025-01-29 ASSESSMENT — COGNITIVE AND FUNCTIONAL STATUS - GENERAL
MOVING TO AND FROM BED TO CHAIR: A LOT
DAILY ACTIVITIY SCORE: 15
MOVING FROM LYING ON BACK TO SITTING ON SIDE OF FLAT BED WITH BEDRAILS: A LOT
CLIMB 3 TO 5 STEPS WITH RAILING: TOTAL
DAILY ACTIVITIY SCORE: 24
HELP NEEDED FOR BATHING: A LOT
MOBILITY SCORE: 11
TURNING FROM BACK TO SIDE WHILE IN FLAT BAD: A LOT
DRESSING REGULAR LOWER BODY CLOTHING: TOTAL
MOBILITY SCORE: 24
WALKING IN HOSPITAL ROOM: A LOT
STANDING UP FROM CHAIR USING ARMS: A LOT
PATIENT BASELINE BEDBOUND: NO
TOILETING: A LOT
DRESSING REGULAR UPPER BODY CLOTHING: A LITTLE
PERSONAL GROOMING: A LITTLE

## 2025-01-29 ASSESSMENT — ENCOUNTER SYMPTOMS
VOMITING: 0
CHEST TIGHTNESS: 0
CHILLS: 0
COUGH: 0
FATIGUE: 0
DIARRHEA: 0
NAUSEA: 0
FEVER: 1
COLOR CHANGE: 1
ABDOMINAL PAIN: 0
SHORTNESS OF BREATH: 0

## 2025-01-29 ASSESSMENT — ACTIVITIES OF DAILY LIVING (ADL)
GROOMING: INDEPENDENT
ADL_ASSISTANCE: INDEPENDENT
PATIENT'S MEMORY ADEQUATE TO SAFELY COMPLETE DAILY ACTIVITIES?: NO
BATHING_ASSISTANCE: MODERATE
ADL_ASSISTANCE: INDEPENDENT
LACK_OF_TRANSPORTATION: NO
BATHING: INDEPENDENT
LACK_OF_TRANSPORTATION: NO
HEARING - LEFT EAR: FUNCTIONAL
DRESSING YOURSELF: INDEPENDENT
HEARING - RIGHT EAR: FUNCTIONAL
FEEDING YOURSELF: INDEPENDENT
WALKS IN HOME: INDEPENDENT
ADEQUATE_TO_COMPLETE_ADL: NO
TOILETING: INDEPENDENT
JUDGMENT_ADEQUATE_SAFELY_COMPLETE_DAILY_ACTIVITIES: NO

## 2025-01-29 ASSESSMENT — PAIN - FUNCTIONAL ASSESSMENT: PAIN_FUNCTIONAL_ASSESSMENT: 0-10

## 2025-01-29 ASSESSMENT — LIFESTYLE VARIABLES
AUDIT-C TOTAL SCORE: 0
PRESCIPTION_ABUSE_PAST_12_MONTHS: NO
AUDIT-C TOTAL SCORE: 0
AUDIT-C TOTAL SCORE: 0
HOW OFTEN DO YOU HAVE A DRINK CONTAINING ALCOHOL: NEVER
HOW MANY STANDARD DRINKS CONTAINING ALCOHOL DO YOU HAVE ON A TYPICAL DAY: PATIENT DOES NOT DRINK
SKIP TO QUESTIONS 9-10: 1
SUBSTANCE_ABUSE_PAST_12_MONTHS: NO
HOW OFTEN DO YOU HAVE 6 OR MORE DRINKS ON ONE OCCASION: NEVER
SKIP TO QUESTIONS 9-10: 1

## 2025-01-29 ASSESSMENT — PAIN SCALES - GENERAL
PAINLEVEL_OUTOF10: 0 - NO PAIN

## 2025-01-29 ASSESSMENT — PATIENT HEALTH QUESTIONNAIRE - PHQ9
SUM OF ALL RESPONSES TO PHQ9 QUESTIONS 1 & 2: 0
1. LITTLE INTEREST OR PLEASURE IN DOING THINGS: NOT AT ALL
2. FEELING DOWN, DEPRESSED OR HOPELESS: NOT AT ALL

## 2025-01-29 NOTE — CARE PLAN
The patient's goals for the shift include maintain fever free    The clinical goals for the shift include receive IV fluids

## 2025-01-29 NOTE — ED NOTES
Pt's bilateral lower legs cleansed with NS. Xeroform applied to both legs, ABD pads placed over both legs and ACE wraps applied to hold dressings in place.     @0145: pt placed in hospital bed for comfort. Awaiting for bed placement at [facility].      Yosvany Jim RN  01/29/25 5717

## 2025-01-29 NOTE — PROGRESS NOTES
01/29/25 1106   Discharge Planning   Living Arrangements Alone   Support Systems Family members;Friends/neighbors   Type of Residence Private residence   Number of Stairs to Enter Residence 0   Number of Stairs Within Residence 0   Do you have animals or pets at home? No   Who is requesting discharge planning? Provider   Home or Post Acute Services None;Other (Comment)  (pt is declining SNF or HHC/PT--pt said that she will continue to go to the wound Clinic for her care)   Expected Discharge Disposition Home   Does the patient need discharge transport arranged? No   Financial Resource Strain   How hard is it for you to pay for the very basics like food, housing, medical care, and heating? Not hard   Housing Stability   In the last 12 months, was there a time when you were not able to pay the mortgage or rent on time? N   In the past 12 months, how many times have you moved where you were living? 0   At any time in the past 12 months, were you homeless or living in a shelter (including now)? N   Transportation Needs   In the past 12 months, has lack of transportation kept you from medical appointments or from getting medications? no   In the past 12 months, has lack of transportation kept you from meetings, work, or from getting things needed for daily living? No   Patient Choice   Patient / Family choosing to utilize agency / facility established prior to hospitalization No   Stroke Family Assessment   Stroke Family Assessment Needed No

## 2025-01-29 NOTE — PROGRESS NOTES
This patient was seen by the advanced practice provider.  I have personally performed a substantive portion of the encounter.  I have seen and examined the patient; agree with the workup, evaluation, MDM, management and diagnosis. The care plan has been discussed.      I personally saw the patient and made/approved the management plan and take responsibility for the patient management.    History:  49-year-old female with history of lymphedema presents with possible cellulitis.  Febrile at home to 102.  Patient sees wound clinic for chronic leg wounds but family member at bedside is concerned there is an infection.    Exam:  General Appearance: No acute distress  Respiratory: Breathing comfortably on room air  Cardiovascular: Tachycardia, regular rhythm   GI: Soft, nontender, nondistended  MSK: Significant amount of swelling noted to bilateral lower extremities with wound dressings in place  Neuro:  AAOx3, Nonfocal    MDM:  49 y.o. female present with redness and swelling of skin.  I have considered the following with regards to this patient's condition: abscess, cellulitis, necrotizing fasciitis, lymphedema.  Patient meeting SIRS criteria, initiated on antibiotics, lactate sent, IV fluids given.  Patient with a leukocytosis of 23.6.  Lactate 2.1.  Patient is out of network at the  system, will require transfer to outside hospital.  At this time I think the patient would benefit from inpatient admission given concerns for possible sepsis related to severe cellulitis.  At the end of my shift, I am still pending a return call from the outside facility.    FINAL IMPRESSION      1. Cellulitis of lower extremity, unspecified laterality    2. SIRS (systemic inflammatory response syndrome) (Multi)

## 2025-01-29 NOTE — PROGRESS NOTES
Physical Therapy    Physical Therapy Evaluation    Patient Name: Roro Marshall  MRN: [id number]  Department: [address]  Room: [facility]  Today's Date: 1/29/2025   Time Calculation  Start Time: 1318  Stop Time: 1340  Time Calculation (min): 22 min    Assessment/Plan   PT Assessment  PT Assessment Results: Decreased strength, Decreased endurance, Decreased range of motion, Decreased mobility, Decreased coordination, Impaired judgement, Decreased skin integrity  Rehab Prognosis: Good  Barriers to Discharge Home: Physical needs  Evaluation/Treatment Tolerance: Patient tolerated treatment well, Patient limited by fatigue  Medical Staff Made Aware: Yes  Barriers to Participation: Comorbidities  End of Session Communication: Bedside nurse  Assessment Comment: pt would benefit from skilled therapy services.  End of Session Patient Position: Bed, 3 rail up, Alarm on  IP OR SWING BED PT PLAN  Inpatient or Swing Bed: Inpatient  PT Plan  Treatment/Interventions: Bed mobility, Transfer training, Gait training, Strengthening, Endurance training, Therapeutic exercise, Range of motion, Balance training  PT Plan: Ongoing PT  PT Frequency: 4 times per week  PT Discharge Recommendations: Moderate intensity level of continued care  Equipment Recommended upon Discharge:  (HDRW vs cane)  PT Recommended Transfer Status:  (MIN/MOD A x 1-2)  PT - OK to Discharge: Yes    Subjective   General Visit Information:  General  Reason for Referral: pt is a 50 y/o female admitted with B LE cellulitis; pt c/o fever, chills and weakness. h/o lymphedema. impaired mobility  Referred By: Cecilio Bianchi DO  Past Medical History Relevant to Rehab: lymphedema, anemia, B12 def, rheumatic fever, OP, RA, Lupus, plantar fasciitis, kelley, cardiac surgery, hypothyroidism; ankylosing spondylitis;  Family/Caregiver Present: No  Co-Treatment: OT  Co-Treatment Reason: Physically complex eval requiring 2 skilled Therapists  Prior to Session Communication: Bedside  nurse  Patient Position Received: Bed, 3 rail up, Alarm on  General Comment: pt alert and cooperative; pt willing to work with therapy    Home Living:  Home Living  Type of Home: Apartment  Lives With: Alone  Home Adaptive Equipment: None  Home Layout: One level  Home Access: Level entry  Bathroom Shower/Tub: Tub/shower unit  Bathroom Toilet: Standard  Bathroom Equipment: Grab bars in shower  Home Living Comments: pt reported taking her laundry to a friends house    Prior Level of Function:  Prior Function Per Pt/Caregiver Report  Level of [place]: Independent with ADLs and functional transfers, Independent with homemaking with ambulation  Receives Help From: Family  ADL Assistance: Independent  Homemaking Assistance: Needs assistance  Ambulatory Assistance: Independent (pt reported short distances)  Vocational: Full time employment ([employer])  Prior Function Comments: pt was active and independent in community; takes [place] vs family transportation    Precautions:  Precautions  Hearing/Visual Limitations: wears glasses  Medical Precautions: Fall precautions  Precautions Comment: educated and instructed pt in safety techniques during mobility      Date/Time Vitals Session Patient Position Pulse Resp SpO2 BP MAP (mmHg)    01/29/25 1318 --  --  93  --  --  107/60  --     01/29/25 1411 --  --  94  19  100 %  117/74  87                 Objective   Pain:  Pain Assessment  Pain Assessment:  (0/10)  Cognition:  Cognition  Overall Cognitive Status: Within Functional Limits  Orientation Level: Oriented X4  Insight: Moderate    General Assessments:     Activity Tolerance  Endurance:  (FAIR+ activity tolerance)    Sensation  Sensation Comment: denies any numbness/tingling         Coordination  Coordination Comment: mild lateral sway    Postural Control  Posture Comment: rounded shoulders    Static Sitting Balance  Static Sitting-Comment/Number of Minutes: GOOD+  Dynamic Sitting Balance  Dynamic  Sitting-Comments: GOOD    Static Standing Balance  Static Standing-Comment/Number of Minutes: FAIR+  Dynamic Standing Balance  Dynamic Standing-Comments: FAIR      Functional Assessments:  Bed Mobility  Bed Mobility:  (supine to sit with MOD A for trunk up and B LE. MIN A for scooting. pt returned to supine with MOD A x 1-2 for B LE and trunk down.)    Transfers  Transfer:  (sit <-.>stand with MIN A x 2; increased time and effort noted.)    Ambulation/Gait Training  Ambulation/Gait Training Performed:  (pt amb 20' x 1 with MIN A/Handheld A x 1; pt presented with wider SELINA, short B step length and increased lateral sway. mild fatigue after gait training.)       Extremity/Trunk Assessments:  RUE   RUE : Within Functional Limits  LUE   LUE: Within Functional Limits  RLE   RLE :  (WFL with grossly 3/5 strength; moderate edema and redness noted. + compression bandage)  LLE   LLE :  (WFL with grossly 3/5 strength; moderate edema and redness noted. + compression bandage)        Outcome Measures:  [facility] Basic Mobility  Turning from your back to your side while in a flat bed without using bedrails: A lot  Moving from lying on your back to sitting on the side of a flat bed without using bedrails: A lot  Moving to and from bed to chair (including a wheelchair): A lot  Standing up from a chair using your arms (e.g. wheelchair or bedside chair): A lot  To walk in hospital room: A lot  Climbing 3-5 steps with railing: Total  Basic Mobility - Total Score: 11    Encounter Problems       Encounter Problems (Active)       Mobility       pt will ambulate 60' x 1 using HDRW vs cane MOD INDEPENDENT (Progressing)       Start:  01/29/25    Expected End:  02/12/25               PT Transfers       STG - Patient to transfer to and from sit to supine with SBA (Progressing)       Start:  01/29/25    Expected End:  02/12/25            STG - Patient will transfer sit to and from stand with MOD INDEPENDENT (Progressing)       Start:  01/29/25     Expected End:  02/12/25               Pain - Adult              Education Documentation  Precautions, taught by Darrell Bose, PT at 1/29/2025  3:10 PM.  Learner: Patient  Readiness: Eager  Method: Explanation  Response: Verbalizes Understanding, Demonstrated Understanding    Mobility Training, taught by Darrell Bose PT at 1/29/2025  3:10 PM.  Learner: Patient  Readiness: Eager  Method: Explanation  Response: Verbalizes Understanding, Demonstrated Understanding    Education Comments  No comments found.

## 2025-01-29 NOTE — PROGRESS NOTES
Pharmacy Medication History Review    Roro Marshall is a 49 y.o. female admitted for Cellulitis of lower extremity, unspecified laterality. Pharmacy reviewed the patient's eyjau-el-ruhkllxpe medications and allergies for accuracy.    Medications ADDED:  Acetaminophen 650mg  Medications CHANGED:  Hydroxychloroquine 200mg - QD  Medications REMOVED:   None      The list below reflects the updated PTA list. Comments regarding how patient may be taking medications differently can be found in the Admit Orders Activity  Prior to Admission Medications   Prescriptions Last Dose Informant   acetaminophen (Tylenol 8 HOUR) 650 mg ER tablet 1/28/2025 Self   Sig: Take 2 tablets (1,300 mg) by mouth every 8 hours if needed for mild pain (1 - 3). Do not crush, chew, or split.   cyanocobalamin (Vitamin B-12) 1,000 mcg tablet 1/27/2025 Self   Sig: Take 1 tablet (1,000 mcg) by mouth once daily.   ergocalciferol (Vitamin D-2) 1.25 MG (73547 UT) capsule Past Week Self   Sig: Take 1 capsule (1,250 mcg) by mouth 3 times a week.   hydroxychloroquine (Plaquenil) 200 mg tablet 1/28/2025 Self   Sig: Take 1 tablet (200 mg) by mouth once a day.   levothyroxine (Synthroid) 137 mcg tablet 1/28/2025 Self   Sig: Take 1 tablet (137 mcg) by mouth once daily in the morning. Take before meals.      Facility-Administered Medications: None        The list below reflects the updated allergy list. Please review each documented allergy for additional clarification and justification.  Allergies  Reviewed by Jasmyn Baez CPhT on 1/29/2025        Severity Reactions Comments    Onion High Anaphylaxis     Spider Venom Medium Other Blister/ cellulitis    Influenza Virus Vaccines Low Other Redness - Irritation            Pharmacy has been updated to [facility].    Sources used to complete the med history include dispense history, PTA medication list, patient interview. Patient is a good historian.    Below are additional concerns with the  patient's PTA list.  None     Jasmyn Baez, Maude-Adv  Please reach out via [employer] Secure Chat for questions

## 2025-01-29 NOTE — ASSESSMENT & PLAN NOTE
Meets criteria with source as cellulitis with fever, tachycardia, and leukocytosis  Blood cultures taken in the emergency department.  Follow  Lower extremities just dressed by nurse and will leave intact for now, but will consult wound care infectious disease; discussed appearance with ER staff  Continue vancomycin and Zosyn started the emergency department.

## 2025-01-29 NOTE — PROGRESS NOTES
Roro Marshall is a 49 y.o. female on day 0 of admission presenting with Cellulitis of lower extremity, unspecified laterality.      Subjective   Seen while boarding in the ER. Sleeping comfortably, awakens easily to voice. States she feels tired this morning but otherwise denies any complaints.        Objective     Last Recorded Vitals  /73 (BP Location: Left arm, Patient Position: Lying)   Pulse (!) 104   Temp 37.4 °C (99.3 °F) (Oral)   Resp 20   Wt 145 kg (320 lb)   SpO2 97%   Intake/Output last 3 Shifts:  No intake or output data in the 24 hours ending 01/29/25 0959    Admission Weight  Weight: 145 kg (320 lb) (01/28/25 1713)    Daily Weight  01/28/25 : 145 kg (320 lb)    Image Results  XR chest 1 view  Narrative: Interpreted By:  Kyle Villarreal,   STUDY:  XR CHEST 1 VIEW;  1/28/2025 6:48 pm      INDICATION:  Signs/Symptoms:fever.          COMPARISON:  Radiographs of the chest dated 04/10/2019.      ACCESSION NUMBER(S):  [id number]      ORDERING CLINICIAN:  MARIELY MCKAY      FINDINGS:  AP radiograph of the chest was provided.              CARDIOMEDIASTINAL SILHOUETTE:  Cardiomediastinal silhouette is mildly enlarged.      LUNGS:  Somewhat low lung volumes are present with pulmonary vascular  congestion and bronchovascular crowding. Trace fluid is present along  the fissure in the right lung. No sizable consolidation or pleural  effusion is noted.      ABDOMEN:  No remarkable upper abdominal findings.      BONES:  No acute osseous changes.      Impression: 1.  Mild enlargement of the cardiomediastinal silhouette is pulmonary  vascular congestion and trace fluid along the fissure in the right  lung. No consolidation or sizable pleural effusion is evident.  Correlate with fluid volume status.              MACRO:  None      Signed by: Kyle Villarreal 1/28/2025 7:05 PM  Dictation workstation:   [id number]      Physical Exam  Constitutional:       General: She is not in acute distress.      Appearance: She is not toxic-appearing.      Comments: Sleeping, awakens easily to voice. Tired appearing   HENT:      Head: Normocephalic.      Mouth/Throat:      Mouth: Mucous membranes are dry.      Pharynx: Oropharynx is clear.   Eyes:      General: No scleral icterus.  Cardiovascular:      Rate and Rhythm: Normal rate.   Pulmonary:      Effort: No respiratory distress.      Breath sounds: No wheezing.   Abdominal:      General: There is no distension.      Palpations: Abdomen is soft.   Skin:     Comments: B/L lower extremities wrapped with gauze/bandaging   Neurological:      Mental Status: She is oriented to person, place, and time.   Psychiatric:         Behavior: Behavior normal.         Relevant Results               Assessment/Plan        Assessment & Plan  Sepsis (Multi)  Meets criteria with source as cellulitis with fever, tachycardia, and leukocytosis  Follow up blood cultures  Consult ID  Consult wound care  Vanc/zosyn for now, defer antibiotics to ID  Cellulitis of lower extremity, unspecified laterality  As above  Obesity  Significant comorbidity with chronic B/L lower extremity lymphedema  Rheumatoid arthritis of multiple sites with negative rheumatoid factor (Multi)  Follows with rheumatology  Continue plaquenil   Hypothyroidism  Continue synthroid     Plan:  Continue antibiotics per ID  Due to insurance, patient is awaiting transfer to [facility] pending bed availability             Lois Nava MD

## 2025-01-29 NOTE — PROGRESS NOTES
Occupational Therapy    Evaluation    Patient Name: Roro Marshall  MRN: [id number]  Department: [address]  Room: [address] [place] [address]  Today's Date: 1/29/2025  Time Calculation  Start Time: 1320  Stop Time: 1340  Time Calculation (min): 20 min        Assessment:  OT Assessment: Pt presents on eval with generalized weakness, BLE edema, large body habitus, decreased activity tolerance, and impaired standing balance affecting self-care and functional transfers/mobility. Pt will benefit from continued skilled OT to address these deficits and facilitate returning to functional baseline.  Prognosis: Fair  Barriers to Discharge Home: Caregiver assistance, Physical needs  Caregiver Assistance: Patient lives alone and/or does not have reliable caregiver assistance  Physical Needs: 24hr mobility assistance needed, Intermittent ADL assistance needed, High falls risk due to function or environment  Evaluation/Treatment Tolerance: Patient limited by fatigue  End of Session Communication: Bedside nurse  End of Session Patient Position: Bed, 3 rail up, Alarm on (Needs in reach.)  OT Assessment Results: Decreased ADL status, Decreased safe judgment during ADL, Decreased endurance, Decreased functional mobility, Decreased IADLs, Decreased trunk control for functional activities    Plan:  Treatment Interventions: ADL retraining, Functional transfer training, Endurance training, Patient/family training, Equipment evaluation/education, Neuromuscular reeducation, Compensatory technique education  OT Frequency: 4 times per week  OT Discharge Recommendations: Moderate intensity level of continued care  OT Recommended Transfer Status: Minimal assist, Assist of 2  OT - OK to Discharge: Yes      Subjective     General:  General  Reason for Referral: LE cellulitis, impaired ADL's/mobility  Referred By: Cecilio Bianchi DO  Past Medical History Relevant to Rehab: lymphedema, anemia, B12 def, rheumatic fever, OP, RA, Lupus, plantar fasciitis, kelley,  cardiac surgery, hypothyroidism  Family/Caregiver Present: No  Co-Treatment: PT  Co-Treatment Reason: Physically complex eval requiring 2 skilled Therapists  Prior to Session Communication: Bedside nurse  Patient Position Received: Bed, 3 rail up, Alarm off, not on at start of session  General Comment: Pt is a 50 yo female admitted to the hospital with chills, body aches, and fever. Pt diagnosed with LE Cellulitis.    Precautions:  Hearing/Visual Limitations: wears glasses  Medical Precautions: Fall precautions, Lymphedema precautions    Vital Signs Comment: HR 92, /60     Pain:  Pain Assessment  Pain Assessment: 0-10  0-10 (Numeric) Pain Score: 0 - No pain    Objective     Cognition:  Overall Cognitive Status: Within Functional Limits  Orientation Level: Oriented X4  Insight: Moderate (Pt fixated on going home and it appears she is not functioning close to her baseline, which she doesn't acknowledge.)    Home Living:  Type of Home: Apartment  Lives With: Alone  Home Adaptive Equipment: None  Home Layout: One level  Home Access: Level entry  Bathroom Shower/Tub: Tub/shower unit  Bathroom Toilet: Standard  Bathroom Equipment: Grab bars in shower    Prior Function:  Level of [place]: Independent with ADLs and functional transfers, Needs assistance with homemaking  Receives Help From: Family  ADL Assistance: Independent (mod indep for LB ADL's)  Homemaking Assistance: Needs assistance (intermittent assist from family for cleaning, etc.)  Ambulatory Assistance: Independent  Vocational: Full time employment ([employer])  Hand Dominance: Right  Prior Function Comments: Pt uses either [employer] or family assists with transportation.    ADL:  Eating Assistance: Independent  Grooming Assistance: Minimal  Grooming Deficit: Steadying, Supervision/safety (in standing)  Bathing Assistance: Moderate  UE Dressing Assistance: Stand by  UE Dressing Deficit: Setup  LE Dressing Assistance: Total  LE Dressing Deficit:  Don/doff R sock, Don/doff L sock (BLE edema and would benefit from using AE)  Toileting Assistance with Device: Moderate    Activity Tolerance:  Activity Tolerance Comments: Fair-    Bed Mobility/Transfers: Bed Mobility  Bed Mobility:  (Pt completed supine<>sit in bed with mod A x2 with most assist required to move her BLE's.)    Transfers  Transfer:  (Pt completed sit<>stand with min A x2 for balance/safety.)    Functional Mobility:  Functional Mobility  Functional Mobility Performed:  (Pt completed functional mobility for a short household distance using no device with min A x1-2 for balance/safety.)    Standing Balance:  Static Standing Balance  Static Standing-Balance Support: Bilateral upper extremity supported  Static Standing-Level of Assistance: Minimum assistance (x2)     Sensation:  Sensation Comment: BUE's WFL    Strength:  Strength Comments: BUE's WFL    Coordination:  Coordination Comment: HANSA's WFL     Hand Function:  Gross Grasp: Functional  Coordination: Functional      Outcome Measures:[facility] Daily Activity  Putting on and taking off regular lower body clothing: Total  Bathing (including washing, rinsing, drying): A lot  Putting on and taking off regular upper body clothing: A little  Toileting, which includes using toilet, bedpan or urinal: A lot  Taking care of personal grooming such as brushing teeth: A little  Eating Meals: None  Daily Activity - Total Score: 15    Education Documentation  No documentation found.  Education Comments  No comments found.      Goals:  Encounter Problems       Encounter Problems (Active)       OT Goals       Pt will complete all grooming tasks with mod indep in standing. (Progressing)       Start:  01/29/25    Expected End:  02/28/25            Pt will complete LB dressing/bathing with mod indep using adaptive equipment as needed. (Progressing)       Start:  01/29/25    Expected End:  02/28/25            Pt will complete all toileting tasks with mod indep.  (Progressing)       Start:  01/29/25    Expected End:  02/28/25            Pt will complete all functional transfers and mobility with mod indep using a device or no device. (Progressing)       Start:  01/29/25    Expected End:  02/28/25

## 2025-01-29 NOTE — PROGRESS NOTES
Occupational Therapy                 Therapy Communication Note    Patient Name: Roro Marshall  MRN: [id number]  Department: [facility] ED  Room: [place] [address]  Today's Date: 1/29/2025     Discipline: Occupational Therapy    OT Missed Visit: Yes     Missed Visit Reason: Missed Visit Reason: Patient sleeping    Missed Time: Attempt

## 2025-01-29 NOTE — CONSULTS
Inpatient consult to Infectious Diseases  Consult performed by: Meryl Brice, APRN-CNP  Consult ordered by: Cecilio Bianchi DO  Reason for consult: cellulitis        Primary MD: No Assigned PCP Generic Provider, MD      History Of Present Illness  Roro Marshall is a 49 y.o. female presenting with fever.  She has a past medical history of lymphedema.  Patient states onset was yesterday-sudden and abrupt with no relieving factors.  She presented to the ED for further evaluation and management.  Work-up was remarkable for tachycardia, leukocytosis and concern for right leg cellulitis.  She was admitted for further evaluation and management.  Blood cultures were sent and are pending 1/2 sent GPC pairs and chains  She is on IV vancomycin and IV zosyn.  She is currently afebrile, denies chills.  Denies shortness of breath or cough.  Denies nausea, vomiting, diarrhea, abdominal pain.  Denies any pain.  Bilateral lower extremity wrappings in place, right inner upper thigh erythema noted with increased warmth.  Reports seeping serous drainage from her bilateral lower extremities.     Past Medical History  She has a past medical history of Anemia, B12 deficiency, Cardiac left ventricular ejection fraction greater than 40 percent, Cataract, Heart trouble, rheumatic fever, Hyperlipidemia, Hypothyroid (2004), Lower extremity edema, Lupus (2004), Osteoporosis, Plantar fasciitis, Pneumonia, and RA (rheumatoid arthritis).    Surgical History  She has a past surgical history that includes Cholecystectomy (04/10/2015); Cardiac surgery (04/10/2015); Other surgical history (04/10/2015); [place] tooth extraction; Cardiac surgery; and Cholecystectomy (02/21/2003).     Social History     Occupational History    Not on file   Tobacco Use    Smoking status: Never     Passive exposure: Never    Smokeless tobacco: Never   Vaping Use    Vaping status: Never Used   Substance and Sexual Activity    Alcohol use: Never    Drug use: Never     Sexual activity: Not on file     Travel History   Travel since 12/29/24    No documented travel since 12/29/24                Family History  Family History   Problem Relation Name Age of Onset    Brain Aneurysm Mother      Heart attack Father  66    Atrial fibrillation Father      Diabetes Father      Other (brain tumor- fluid on brain) Sister      Multiple sclerosis Sister      Anxiety disorder Brother      Heart disease Mother's Brother      Heart disease Father's Brother      No Known Problems Maternal Grandmother      Cancer Maternal Grandfather      Cancer Paternal Grandmother      Emphysema Paternal Grandfather      Lupus Cousin      Crohn's disease Cousin      Osteoporosis Other Grandmother         age related    Arthritis Other Grandmother     Hypertension Other Grandmother     Lupus Other       Allergies  Onion, Spider venom, and Influenza virus vaccines     Immunization History   Administered Date(s) Administered    Flu vaccine, trivalent, preservative free, age 6 months and greater (Fluarix/Fluzone/Flulaval) 02/09/2011    PPD Test 02/16/2018, 02/23/2018     Medications  Home medications:  (Not in a hospital admission)    Current medications:  Scheduled medications  enoxaparin, 60 mg, subcutaneous, q12h STEFAN  hydroxychloroquine, 200 mg, oral, BID  levothyroxine, 137 mcg, oral, Daily  piperacillin-tazobactam, 4.5 g, intravenous, q6h  vancomycin, 2 g, intravenous, q8h      Continuous medications  sodium chloride 0.9%, 100 mL/hr, Last Rate: 100 mL/hr (01/29/25 0551)      PRN medications  PRN medications: acetaminophen **OR** acetaminophen **OR** acetaminophen, benzocaine-menthol, melatonin, ondansetron ODT **OR** ondansetron, vancomycin    Review of Systems   Constitutional:  Positive for fever. Negative for chills and fatigue.   Respiratory:  Negative for cough, chest tightness and shortness of breath.    Cardiovascular:  Positive for leg swelling. Negative for chest pain.   Gastrointestinal:  Negative  "for abdominal pain, diarrhea, nausea and vomiting.   Skin:  Positive for color change.   All other systems reviewed and are negative.       Objective  Range of Vitals (last 24 hours)  Heart Rate:  [104-122]   Temperature:  [36.5 °C (97.7 °F)-37.4 °C (99.3 °F)]   Respirations:  [16-24]   BP: (102-144)/(39-83)   Height:  [157.5 cm (5' 2\")]   Weight:  [145 kg (320 lb)]   Pulse Ox:  [93 %-99 %]   Daily Weight  01/28/25 : 145 kg (320 lb)    Body mass index is 58.53 kg/m².     Physical Exam  Constitutional:       Appearance: Normal appearance.   HENT:      Head: Normocephalic and atraumatic.   Eyes:      Extraocular Movements: Extraocular movements intact.      Conjunctiva/sclera: Conjunctivae normal.   Cardiovascular:      Rate and Rhythm: Regular rhythm. Tachycardia present.   Pulmonary:      Effort: Pulmonary effort is normal.      Breath sounds: Normal breath sounds.   Abdominal:      General: Bowel sounds are normal.      Palpations: Abdomen is soft.      Tenderness: There is no abdominal tenderness.   Musculoskeletal:      Cervical back: Normal range of motion and neck supple.      Right lower leg: Edema present.      Left lower leg: Edema present.   Skin:     General: Skin is warm and dry.      Comments: BLE wrapping in place. Right inner thigh erythema and increased warmth   Neurological:      General: No focal deficit present.      Mental Status: She is alert and oriented to person, place, and time.   Psychiatric:         Mood and Affect: Mood normal.         Behavior: Behavior normal.          Relevant Results    Labs  Results from last 72 hours   Lab Units 01/28/25  1800   WBC AUTO x10*3/uL 23.6*   HEMOGLOBIN g/dL 12.0   HEMATOCRIT % 38.8   PLATELETS AUTO x10*3/uL 231   NEUTROS PCT AUTO % 89.7   LYMPHS PCT AUTO % 3.1   MONOS PCT AUTO % 4.0   EOS PCT AUTO % 0.0     Results from last 72 hours   Lab Units 01/28/25  1800   SODIUM mmol/L 132*   POTASSIUM mmol/L 3.9   CHLORIDE mmol/L 97*   CO2 mmol/L 27   BUN mg/dL " 11   CREATININE mg/dL 0.51   GLUCOSE mg/dL 122*   CALCIUM mg/dL 9.0   ANION GAP mmol/L 12   EGFR mL/min/1.73m*2 >90     Results from last 72 hours   Lab Units 01/28/25  1800   ALK PHOS U/L 87   BILIRUBIN TOTAL mg/dL 0.7   PROTEIN TOTAL g/dL 8.1   ALT U/L 15   AST U/L 30   ALBUMIN g/dL 3.6     Estimated Creatinine Clearance: 125 mL/min (by C-G formula based on SCr of 0.51 mg/dL).  C-Reactive Protein   Date Value Ref Range Status   03/12/2024 1.80 0.00 - 2.00 mg/dL Final     CRP   Date Value Ref Range Status   09/09/2023 1.0 0 - 2.0 MG/DL Final     Comment:     Performed at [place] [id number]   04/12/2023 1.0 0 - 2.0 MG/DL Final     Comment:     Performed at [address] [address]     Sedimentation Rate   Date Value Ref Range Status   03/12/2024 72 (H) 0 - 20 mm/h Final   09/09/2023 78 (H) 0 - 20 MM/HR Final     Comment:     Performed at [address] [address]   04/12/2023 75 (H) 0 - 20 MM/HR Final     Comment:     Performed at [address] [address]     HIV 1 and 2 Screen   Date Value Ref Range Status   11/14/2022   Final    NONREACTIVE  Reference range: NONREACTIVE     HIV Ag/Ab screen is performed using the Siemens [place]   HIV Ag/Ab Combo assay which detects the presence of HIV    p24 antigen as well as antibodies to HIV-1   (Group M and O) and HIV-2.  .  No laboratory evidence of HIV infection. If acute HIV infection is   suspected, consider testing for HIV RNA by PCR (viral load).  Test Performed at:  [facility]), , , ,   :          HIV 1/2 Antibody Confirmation   Date Value Ref Range Status   07/18/2020 Test Not Indicated  Final     Hepatitis C Ab   Date Value Ref Range Status   07/18/2020   Final    Negative  Reference range: NEGATIVE  Performed at the [facility] unless   otherwise noted.       Microbiology  Reviewed  Blood culture 1/2set with GPC pairs and  chains  Wound culture Feb 2024 grew MSSA, pseudomonas    Imaging  XR chest 1 view    Result Date: 1/28/2025  Interpreted By:  Kyle Villarreal, STUDY: XR CHEST 1 VIEW;  1/28/2025 6:48 pm   INDICATION: Signs/Symptoms:fever.     COMPARISON: Radiographs of the chest dated 04/10/2019.   ACCESSION NUMBER(S): [id number]   ORDERING CLINICIAN: MARIELY MCKAY   FINDINGS: AP radiograph of the chest was provided.       CARDIOMEDIASTINAL SILHOUETTE: Cardiomediastinal silhouette is mildly enlarged.   LUNGS: Somewhat low lung volumes are present with pulmonary vascular congestion and bronchovascular crowding. Trace fluid is present along the fissure in the right lung. No sizable consolidation or pleural effusion is noted.   ABDOMEN: No remarkable upper abdominal findings.   BONES: No acute osseous changes.       1.  Mild enlargement of the cardiomediastinal silhouette is pulmonary vascular congestion and trace fluid along the fissure in the right lung. No consolidation or sizable pleural effusion is evident. Correlate with fluid volume status.       MACRO: None   Signed by: Kyle Villarreal 1/28/2025 7:05 PM Dictation workstation:   [id number]     Assessment/Plan   Sepsis- tachycardia, leukocytosis, reported fever at home  Bacteremia-gram positive cocci pairs and chains-streptococcus versus enterococcus  Right leg cellulitis-risk factor is lymphedema  Leukocytosis  Lymphedema    Continue IV zosyn  Discontinue IV vancomycin  Begin IV daptomycin  CK   Follow-up blood cultures  Wound culture  Local care  Monitor WBC and temperature  Further recommendations based  on pending work-up    Reviewed with Dr Mirmaontes    Total time spent caring for the patient today was 45 minutes.  This includes time spent before the visit reviewing the chart, time spent during the visit, and time spent after the visit on documentation.        Meryl Brice, APRN-CNP

## 2025-01-29 NOTE — CARE PLAN
Pt does not have a POA or Living Will  ADOD: 2 days    Pt lives alone in a 1st floor apt. She has lymphedema with leg wounds and she uses the wound center for her care and she is declining a SNF and HHC at this time  She does not use any assistive device for ambulation  No home 02/cpap/bipap  She is independent with ADL's. She does not drive, she uses [employer], friends and family for transportation to her appointments, shopping and work.  She wears glasses, no hearing aids. She said that she can read and comprehend what she is reading.  She denies depression and anxiety. She uses [facility] in [place] for her scripts and she can afford her meds  Pt is here for a fever and cellulitis of her lower extremity   No anticipated discharge needs at this time    DISCHARGE PLAN: HOME

## 2025-01-29 NOTE — ASSESSMENT & PLAN NOTE
Meets criteria with source as cellulitis with fever, tachycardia, and leukocytosis  Follow up blood cultures  Consult ID  Consult wound care  Vanc/zosyn for now, defer antibiotics to ID

## 2025-01-29 NOTE — PROGRESS NOTES
01/29/25 1108   [facility] Disability Status   Are you deaf or do you have serious difficulty hearing? N   Are you blind or do you have serious difficulty seeing, even when wearing glasses? N   Because of a physical, mental, or emotional condition, do you have serious difficulty concentrating, remembering, or making decisions? (5 years old or older) N   Do you have serious difficulty walking or climbing stairs? Y   Do you have serious difficulty dressing or bathing? N   Because of a physical, mental, or emotional condition, do you have serious difficulty doing errands alone such as visiting the doctor? Y

## 2025-01-29 NOTE — H&P
History Of Present Illness  Roro Marshall is a 49 y.o. female presenting with fever.  Patient has a history of lymphedema following with wound care clinic with Dr. Miramontes.  On the day of presentation, patient had a fever of 102 prompting her to come into the emergency department.  She cannot tell me if she has worsening swelling or drainage, but ER staff to discuss with me at the right leg appeared worse; nursing has dressed in Kerlix and Ace bandage bilaterally.  No nausea.  No vomiting.  No worsening leg pain.  In the emergency department, concern for sepsis given tachycardia and leukocytosis and fever and patient received 30 cc/kg normal saline bolus and started on vancomycin and Zosyn.  Due to the patient's insurance, she has been accepted at [facility] which is apparently the only hospital in the area which accepts her insurance, but they are pending a bed and ER requested admission here.     Past Medical History  She has a past medical history of Anemia, B12 deficiency, Cardiac left ventricular ejection fraction greater than 40 percent, Cataract, Heart trouble, rheumatic fever, Hyperlipidemia, Hypothyroid (2004), Lower extremity edema, Lupus (2004), Osteoporosis, Plantar fasciitis, Pneumonia, and RA (rheumatoid arthritis).    Surgical History  She has a past surgical history that includes Cholecystectomy (04/10/2015); Cardiac surgery (04/10/2015); Other surgical history (04/10/2015); [place] tooth extraction; Cardiac surgery; and Cholecystectomy (02/21/2003).     Social History  She reports that she has never smoked. She has never been exposed to tobacco smoke. She has never used smokeless tobacco. She reports that she does not drink alcohol and does not use drugs.    Family History  Family History   Problem Relation Name Age of Onset    Brain Aneurysm Mother      Heart attack Father  66    Atrial fibrillation Father      Diabetes Father      Other (brain tumor- fluid on brain) Sister      Multiple sclerosis  Sister      Anxiety disorder Brother      Heart disease Mother's Brother      Heart disease Father's Brother      No Known Problems Maternal Grandmother      Cancer Maternal Grandfather      Cancer Paternal Grandmother      Emphysema Paternal Grandfather      Lupus Cousin      Crohn's disease Cousin      Osteoporosis Other Grandmother         age related    Arthritis Other Grandmother     Hypertension Other Grandmother     Lupus Other          Allergies  Onion, Spider venom, and Influenza virus vaccines    Review of Systems   All other systems reviewed and are negative.       Physical Exam  Constitutional:       General: She is not in acute distress.     Appearance: She is obese. She is not toxic-appearing or diaphoretic.   HENT:      Head: Normocephalic.      Right Ear: External ear normal.      Left Ear: External ear normal.      Nose: Nose normal.      Mouth/Throat:      Pharynx: Oropharynx is clear.   Eyes:      Conjunctiva/sclera: Conjunctivae normal.   Cardiovascular:      Rate and Rhythm: Regular rhythm. Tachycardia present.   Pulmonary:      Effort: Pulmonary effort is normal.      Comments: Diminished bilaterally  Abdominal:      Palpations: Abdomen is soft.      Tenderness: There is no abdominal tenderness.   Skin:     Comments: Bilateral lower extremities in clean dry Ace bandages over Kerlix.  Area of erythema outside bandage appears worse on right leg compared to left.   Neurological:      Mental Status: She is alert.          Last Recorded Vitals  BP (!) 104/39 (BP Location: Left arm, Patient Position: Lying)   Pulse (!) 116   Temp 36.8 °C (98.2 °F) (Oral)   Resp (!) 22   Wt 145 kg (320 lb)   SpO2 (!) 93%     Relevant Results        Results for orders placed or performed during the hospital encounter of 01/28/25 (from the past 24 hours)   CBC and Auto Differential   Result Value Ref Range    WBC 23.6 (H) 4.4 - 11.3 x10*3/uL    nRBC 0.1 (H) 0.0 - 0.0 /100 WBCs    RBC 4.23 4.00 - 5.20 x10*6/uL     Hemoglobin 12.0 12.0 - 16.0 g/dL    Hematocrit 38.8 36.0 - 46.0 %    MCV 92 80 - 100 fL    MCH 28.4 26.0 - 34.0 pg    MCHC 30.9 (L) 32.0 - 36.0 g/dL    RDW 15.3 (H) 11.5 - 14.5 %    Platelets 231 150 - 450 x10*3/uL    Neutrophils % 89.7 40.0 - 80.0 %    Immature Granulocytes %, Automated 2.8 (H) 0.0 - 0.9 %    Lymphocytes % 3.1 13.0 - 44.0 %    Monocytes % 4.0 2.0 - 10.0 %    Eosinophils % 0.0 0.0 - 6.0 %    Basophils % 0.4 0.0 - 2.0 %    Neutrophils Absolute 21.18 (H) 1.20 - 7.70 x10*3/uL    Immature Granulocytes Absolute, Automated 0.67 0.00 - 0.70 x10*3/uL    Lymphocytes Absolute 0.73 (L) 1.20 - 4.80 x10*3/uL    Monocytes Absolute 0.95 0.10 - 1.00 x10*3/uL    Eosinophils Absolute 0.01 0.00 - 0.70 x10*3/uL    Basophils Absolute 0.09 0.00 - 0.10 x10*3/uL   Comprehensive Metabolic Panel   Result Value Ref Range    Glucose 122 (H) 74 - 99 mg/dL    Sodium 132 (L) 136 - 145 mmol/L    Potassium 3.9 3.5 - 5.3 mmol/L    Chloride 97 (L) 98 - 107 mmol/L    Bicarbonate 27 21 - 32 mmol/L    Anion Gap 12 10 - 20 mmol/L    Urea Nitrogen 11 6 - 23 mg/dL    Creatinine 0.51 0.50 - 1.05 mg/dL    eGFR >90 >60 mL/min/1.73m*2    Calcium 9.0 8.6 - 10.3 mg/dL    Albumin 3.6 3.4 - 5.0 g/dL    Alkaline Phosphatase 87 33 - 110 U/L    Total Protein 8.1 6.4 - 8.2 g/dL    AST 30 9 - 39 U/L    Bilirubin, Total 0.7 0.0 - 1.2 mg/dL    ALT 15 7 - 45 U/L   Lactate   Result Value Ref Range    Lactate 2.1 (H) 0.4 - 2.0 mmol/L   SARS-CoV-2 RT PCR   Result Value Ref Range    Coronavirus 2019, PCR Not Detected Not Detected   Influenza A, and B PCR   Result Value Ref Range    Flu A Result Not Detected Not Detected    Flu B Result Not Detected Not Detected   Blood Culture    Specimen: Peripheral Venipuncture; Blood culture   Result Value Ref Range    Blood Culture Loaded on Instrument - Culture in progress    Blood Culture    Specimen: Peripheral Venipuncture; Blood culture   Result Value Ref Range    Blood Culture Loaded on Instrument - Culture in  progress    Lactate   Result Value Ref Range    Lactate 0.9 0.4 - 2.0 mmol/L         Assessment/Plan   Assessment & Plan  Sepsis (Multi)  Meets criteria with source as cellulitis with fever, tachycardia, and leukocytosis  Blood cultures taken in the emergency department.  Follow  Lower extremities just dressed by nurse and will leave intact for now, but will consult wound care infectious disease; discussed appearance with ER staff  Continue vancomycin and Zosyn started the emergency department.  Cellulitis of lower extremity, unspecified laterality  As above  Obesity  Significant comorbidity.    Disposition: Pending bed at [facility] where she is excepted due to insurance reasons.  Patient is accepted by the hospitalist service in the meantime.       Cecilio Bianchi, DO

## 2025-01-29 NOTE — PROGRESS NOTES
01/29/25 1056   Physical Activity   On average, how many days per week do you engage in moderate to strenuous exercise (like a brisk walk)? 0 days   On average, how many minutes do you engage in exercise at this level? 0 min   Financial Resource Strain   How hard is it for you to pay for the very basics like food, housing, medical care, and heating? Not hard   Housing Stability   In the last 12 months, was there a time when you were not able to pay the mortgage or rent on time? N   In the past 12 months, how many times have you moved where you were living? 0   At any time in the past 12 months, were you homeless or living in a shelter (including now)? N   Transportation Needs   In the past 12 months, has lack of transportation kept you from medical appointments or from getting medications? no  (pt uses [facility], family, friends)   In the past 12 months, has lack of transportation kept you from meetings, work, or from getting things needed for daily living? No   Food Insecurity   Within the past 12 months, you worried that your food would run out before you got the money to buy more. Never true   Within the past 12 months, the food you bought just didn't last and you didn't have money to get more. Never true   Stress   Do you feel stress - tense, restless, nervous, or anxious, or unable to sleep at night because your mind is troubled all the time - these days? Not at all   Social Connections   In a typical week, how many times do you talk on the phone with family, friends, or neighbors? More than 3   How often do you get together with friends or relatives? Once   How often do you attend Mu-ism or Baptism services? More than 4   Do you belong to any clubs or organizations such as Mu-ism groups, unions, fraternal or athletic groups, or school groups? No   How often do you attend meetings of the clubs or organizations you belong to? Never   Are you , , , ,  never , or living with a partner?    Intimate Partner Violence   Within the last year, have you been afraid of your partner or ex-partner? No   Within the last year, have you been humiliated or emotionally abused in other ways by your partner or ex-partner? No   Within the last year, have you been kicked, hit, slapped, or otherwise physically hurt by your partner or ex-partner? No   Within the last year, have you been raped or forced to have any kind of sexual activity by your partner or ex-partner? No   Alcohol Use   Q1: How often do you have a drink containing alcohol? Never   Q2: How many drinks containing alcohol do you have on a typical day when you are drinking? None   Q3: How often do you have six or more drinks on one occasion? Never   Utilities   In the past 12 months has the electric, gas, oil, or water company threatened to shut off services in your home? No   Health Literacy   How often do you need to have someone help you when you read instructions, pamphlets, or other written material from your doctor or pharmacy? Never

## 2025-01-29 NOTE — ED PROVIDER NOTES
Patient was initially seen by my colleague and endorsed to me on signout.    Briefly, patient is a 49-year-old female who presented to the emergency department for evaluation of chills, body aches.  Patient was found to have cellulitis of lower extremities.  She is tachycardic and has a white count and patient is consistent with sepsis secondary to cellulitis of lower extremities.  As there is concern for volume overload patient was not given 30 cc/kg bolus.  Patient was signed out to me pending potential transfer to [facility] as patient is out of network on her insurance.    Exam  General: Uncomfortable appearing, no obvious distress  HEENT: Head is normocephalic, atraumatic, moist X membranes  Cardiac: Tachycardic, regular rhythm  Pulmonary: Respirations easy, nonlabored  Extremities: There is significant erythema to the lower extremities with Ace wrap present.  It is warm to the touch consistent with cellulitis.    I discussed case with attending physician Dr. Prakash at [facility] and he accepted patient for transfer.  Patient still awaiting bed at this time and in discussion with [facility] transfer center there is still no bed available at this time as they are at capacity and patient will be admitted here until bed becomes available at [facility].  I did discuss case with hospitalist who accepted patient for admission.     Marcus Burns, DO  01/29/25 [id number]

## 2025-01-29 NOTE — PROGRESS NOTES
Vancomycin Dosing by Pharmacy- Cessation of Therapy    Consult to pharmacy for vancomycin dosing has been discontinued by the prescriber, pharmacy will sign off at this time.    Please call pharmacy if there are further questions or re-enter a consult if vancomycin is resumed.     Isamar Lemon, PharmD

## 2025-01-30 ENCOUNTER — DOCUMENTATION (OUTPATIENT)
Dept: RESEARCH | Age: 50
End: 2025-01-30
Payer: COMMERCIAL

## 2025-01-30 PROBLEM — R78.81 BACTEREMIA DUE TO STREPTOCOCCUS: Status: ACTIVE | Noted: 2025-01-30

## 2025-01-30 PROBLEM — B95.5 BACTEREMIA DUE TO STREPTOCOCCUS: Status: ACTIVE | Noted: 2025-01-30

## 2025-01-30 LAB
ALBUMIN SERPL BCP-MCNC: 2.7 G/DL (ref 3.4–5)
ALP SERPL-CCNC: 76 U/L (ref 33–110)
ALT SERPL W P-5'-P-CCNC: 10 U/L (ref 7–45)
ANION GAP SERPL CALCULATED.3IONS-SCNC: 8 MMOL/L (ref 10–20)
AST SERPL W P-5'-P-CCNC: 21 U/L (ref 9–39)
BASOPHILS # BLD AUTO: 0.12 X10*3/UL (ref 0–0.1)
BASOPHILS NFR BLD AUTO: 0.6 %
BILIRUB SERPL-MCNC: 0.6 MG/DL (ref 0–1.2)
BUN SERPL-MCNC: 11 MG/DL (ref 6–23)
CALCIUM SERPL-MCNC: 7.7 MG/DL (ref 8.6–10.3)
CHLORIDE SERPL-SCNC: 106 MMOL/L (ref 98–107)
CK SERPL-CCNC: 347 U/L (ref 0–215)
CO2 SERPL-SCNC: 26 MMOL/L (ref 21–32)
CREAT SERPL-MCNC: 0.46 MG/DL (ref 0.5–1.05)
EGFRCR SERPLBLD CKD-EPI 2021: >90 ML/MIN/1.73M*2
EOSINOPHIL # BLD AUTO: 0.15 X10*3/UL (ref 0–0.7)
EOSINOPHIL NFR BLD AUTO: 0.7 %
ERYTHROCYTE [DISTWIDTH] IN BLOOD BY AUTOMATED COUNT: 16 % (ref 11.5–14.5)
GLUCOSE SERPL-MCNC: 95 MG/DL (ref 74–99)
HCT VFR BLD AUTO: 29.9 % (ref 36–46)
HGB BLD-MCNC: 9.2 G/DL (ref 12–16)
IMM GRANULOCYTES # BLD AUTO: 0.22 X10*3/UL (ref 0–0.7)
IMM GRANULOCYTES NFR BLD AUTO: 1 % (ref 0–0.9)
LYMPHOCYTES # BLD AUTO: 2.07 X10*3/UL (ref 1.2–4.8)
LYMPHOCYTES NFR BLD AUTO: 9.8 %
MCH RBC QN AUTO: 28.6 PG (ref 26–34)
MCHC RBC AUTO-ENTMCNC: 30.8 G/DL (ref 32–36)
MCV RBC AUTO: 93 FL (ref 80–100)
MONOCYTES # BLD AUTO: 1.2 X10*3/UL (ref 0.1–1)
MONOCYTES NFR BLD AUTO: 5.7 %
NEUTROPHILS # BLD AUTO: 17.38 X10*3/UL (ref 1.2–7.7)
NEUTROPHILS NFR BLD AUTO: 82.2 %
NRBC BLD-RTO: 0.1 /100 WBCS (ref 0–0)
PLATELET # BLD AUTO: 165 X10*3/UL (ref 150–450)
POTASSIUM SERPL-SCNC: 3.4 MMOL/L (ref 3.5–5.3)
PROT SERPL-MCNC: 6 G/DL (ref 6.4–8.2)
RBC # BLD AUTO: 3.22 X10*6/UL (ref 4–5.2)
SODIUM SERPL-SCNC: 137 MMOL/L (ref 136–145)
WBC # BLD AUTO: 21.1 X10*3/UL (ref 4.4–11.3)

## 2025-01-30 PROCEDURE — 1210000001 HC SEMI-PRIVATE ROOM DAILY

## 2025-01-30 PROCEDURE — 80053 COMPREHEN METABOLIC PANEL: CPT | Performed by: STUDENT IN AN ORGANIZED HEALTH CARE EDUCATION/TRAINING PROGRAM

## 2025-01-30 PROCEDURE — 99232 SBSQ HOSP IP/OBS MODERATE 35: CPT | Performed by: STUDENT IN AN ORGANIZED HEALTH CARE EDUCATION/TRAINING PROGRAM

## 2025-01-30 PROCEDURE — 2500000001 HC RX 250 WO HCPCS SELF ADMINISTERED DRUGS (ALT 637 FOR MEDICARE OP): Performed by: INTERNAL MEDICINE

## 2025-01-30 PROCEDURE — 2500000004 HC RX 250 GENERAL PHARMACY W/ HCPCS (ALT 636 FOR OP/ED)

## 2025-01-30 PROCEDURE — 85025 COMPLETE CBC W/AUTO DIFF WBC: CPT | Performed by: STUDENT IN AN ORGANIZED HEALTH CARE EDUCATION/TRAINING PROGRAM

## 2025-01-30 PROCEDURE — 97116 GAIT TRAINING THERAPY: CPT | Mod: GP,CQ

## 2025-01-30 PROCEDURE — 2500000002 HC RX 250 W HCPCS SELF ADMINISTERED DRUGS (ALT 637 FOR MEDICARE OP, ALT 636 FOR OP/ED): Performed by: STUDENT IN AN ORGANIZED HEALTH CARE EDUCATION/TRAINING PROGRAM

## 2025-01-30 PROCEDURE — 2500000004 HC RX 250 GENERAL PHARMACY W/ HCPCS (ALT 636 FOR OP/ED): Performed by: INTERNAL MEDICINE

## 2025-01-30 PROCEDURE — 97110 THERAPEUTIC EXERCISES: CPT | Mod: GP,CQ

## 2025-01-30 PROCEDURE — 2500000002 HC RX 250 W HCPCS SELF ADMINISTERED DRUGS (ALT 637 FOR MEDICARE OP, ALT 636 FOR OP/ED): Performed by: INTERNAL MEDICINE

## 2025-01-30 PROCEDURE — 36415 COLL VENOUS BLD VENIPUNCTURE: CPT | Performed by: STUDENT IN AN ORGANIZED HEALTH CARE EDUCATION/TRAINING PROGRAM

## 2025-01-30 PROCEDURE — 2500000001 HC RX 250 WO HCPCS SELF ADMINISTERED DRUGS (ALT 637 FOR MEDICARE OP): Performed by: STUDENT IN AN ORGANIZED HEALTH CARE EDUCATION/TRAINING PROGRAM

## 2025-01-30 PROCEDURE — 82550 ASSAY OF CK (CPK): CPT | Performed by: NURSE PRACTITIONER

## 2025-01-30 RX ORDER — POTASSIUM CHLORIDE 20 MEQ/1
40 TABLET, EXTENDED RELEASE ORAL ONCE
Status: COMPLETED | OUTPATIENT
Start: 2025-01-30 | End: 2025-01-30

## 2025-01-30 RX ADMIN — PIPERACILLIN AND TAZOBACTAM 4.5 G: 4; .5 INJECTION, POWDER, FOR SOLUTION INTRAVENOUS at 04:23

## 2025-01-30 RX ADMIN — SODIUM CHLORIDE 100 ML/HR: 900 INJECTION, SOLUTION INTRAVENOUS at 04:23

## 2025-01-30 RX ADMIN — HYDROXYCHLOROQUINE SULFATE 200 MG: 200 TABLET ORAL at 09:05

## 2025-01-30 RX ADMIN — LEVOTHYROXINE SODIUM 137 MCG: 0.14 TABLET ORAL at 05:17

## 2025-01-30 RX ADMIN — ENOXAPARIN SODIUM 60 MG: 60 INJECTION SUBCUTANEOUS at 05:17

## 2025-01-30 RX ADMIN — ENOXAPARIN SODIUM 60 MG: 60 INJECTION SUBCUTANEOUS at 18:36

## 2025-01-30 RX ADMIN — PIPERACILLIN AND TAZOBACTAM 4.5 G: 4; .5 INJECTION, POWDER, FOR SOLUTION INTRAVENOUS at 21:42

## 2025-01-30 RX ADMIN — PIPERACILLIN AND TAZOBACTAM 4.5 G: 4; .5 INJECTION, POWDER, FOR SOLUTION INTRAVENOUS at 09:10

## 2025-01-30 RX ADMIN — POTASSIUM CHLORIDE 40 MEQ: 1500 TABLET, EXTENDED RELEASE ORAL at 09:05

## 2025-01-30 RX ADMIN — PIPERACILLIN AND TAZOBACTAM 4.5 G: 4; .5 INJECTION, POWDER, FOR SOLUTION INTRAVENOUS at 16:24

## 2025-01-30 ASSESSMENT — COGNITIVE AND FUNCTIONAL STATUS - GENERAL
MOVING TO AND FROM BED TO CHAIR: A LITTLE
TURNING FROM BACK TO SIDE WHILE IN FLAT BAD: A LITTLE
WALKING IN HOSPITAL ROOM: A LITTLE
MOVING FROM LYING ON BACK TO SITTING ON SIDE OF FLAT BED WITH BEDRAILS: A LITTLE
CLIMB 3 TO 5 STEPS WITH RAILING: A LOT
MOBILITY SCORE: 17
MOBILITY SCORE: 24
DAILY ACTIVITIY SCORE: 24
STANDING UP FROM CHAIR USING ARMS: A LITTLE

## 2025-01-30 ASSESSMENT — PAIN SCALES - PAIN ASSESSMENT IN ADVANCED DEMENTIA (PAINAD)
BODYLANGUAGE: RELAXED
FACIALEXPRESSION: SMILING OR INEXPRESSIVE
TOTALSCORE: 0
BREATHING: NORMAL
CONSOLABILITY: NO NEED TO CONSOLE

## 2025-01-30 ASSESSMENT — PAIN SCALES - GENERAL
PAINLEVEL_OUTOF10: 0 - NO PAIN

## 2025-01-30 ASSESSMENT — PAIN - FUNCTIONAL ASSESSMENT: PAIN_FUNCTIONAL_ASSESSMENT: 0-10

## 2025-01-30 ASSESSMENT — PAIN SCALES - WONG BAKER: WONGBAKER_NUMERICALRESPONSE: NO HURT

## 2025-01-30 NOTE — CARE PLAN
The patient's goals for the shift include maintain fever free    The clinical goals for the shift include receive IV fluids      Problem: Pain - Adult  Goal: Verbalizes/displays adequate comfort level or baseline comfort level  Outcome: Progressing     Problem: Safety - Adult  Goal: Free from fall injury  Outcome: Progressing     Problem: Discharge Planning  Goal: Discharge to home or other facility with appropriate resources  Outcome: Progressing     Problem: Chronic Conditions and Co-morbidities  Goal: Patient's chronic conditions and co-morbidity symptoms are monitored and maintained or improved  Outcome: Progressing     Problem: Nutrition  Goal: Nutrient intake appropriate for maintaining nutritional needs  Outcome: Progressing     Problem: Skin  Goal: Decreased wound size/increased tissue granulation at next dressing change  Outcome: Progressing  Goal: Participates in plan/prevention/treatment measures  Outcome: Progressing  Goal: Prevent/manage excess moisture  Outcome: Progressing  Goal: Prevent/minimize sheer/friction injuries  Outcome: Progressing  Goal: Promote/optimize nutrition  Outcome: Progressing  Goal: Promote skin healing  Outcome: Progressing     Problem: Fall/Injury  Goal: Not fall by end of shift  Outcome: Progressing  Goal: Be free from injury by end of the shift  Outcome: Progressing  Goal: Verbalize understanding of personal risk factors for fall in the hospital  Outcome: Progressing  Goal: Verbalize understanding of risk factor reduction measures to prevent injury from fall in the home  Outcome: Progressing  Goal: Use assistive devices by end of the shift  Outcome: Progressing  Goal: Pace activities to prevent fatigue by end of the shift  Outcome: Progressing     Problem: Pain  Goal: Takes deep breaths with improved pain control throughout the shift  Outcome: Progressing  Goal: Turns in bed with improved pain control throughout the shift  Outcome: Progressing  Goal: Walks with improved pain  control throughout the shift  Outcome: Progressing  Goal: Performs ADL's with improved pain control throughout shift  Outcome: Progressing  Goal: Participates in PT with improved pain control throughout the shift  Outcome: Progressing  Goal: Free from opioid side effects throughout the shift  Outcome: Progressing  Goal: Free from acute confusion related to pain meds throughout the shift  Outcome: Progressing

## 2025-01-30 NOTE — CARE PLAN
The patient's goals for the shift include maintain fever free    The clinical goals for the shift include maintain safety and comfort.

## 2025-01-30 NOTE — ASSESSMENT & PLAN NOTE
Meets criteria with source as cellulitis with fever, tachycardia, and leukocytosis  Consult ID  Consult wound care  Antibiotics per ID

## 2025-01-30 NOTE — PROGRESS NOTES
Occupational Therapy                 Therapy Communication Note; Discontinue from OT    Patient Name: Roro Marshall  MRN: [id number]  Department: [address]  Room: [address] [place] [address]  Today's Date: 1/30/2025     Discipline: Occupational Therapy          Other (Comment): Went to see patient for OT follow, patient observed ambulating in room and ambulating from bathroom independently.  Pt reporting independent with ADLs and is at baseline level of function.  Pt stating no need for further OT at this time.  Will discontinue OT at this time

## 2025-01-30 NOTE — NURSING NOTE
Assumed care for patent at 1900. Patient is resting in bed with a visitor In the room. No complaint of pain at the moment. Patient is on atb therapy and and tolerating well. Will continue to follow plan of care.

## 2025-01-30 NOTE — PROGRESS NOTES
Physical Therapy    Physical Therapy Treatment    Patient Name: Roro Marshall  MRN: [id number]  Department: [address]  Room: [address] [place] [address]  Today's Date: 1/30/2025  Time Calculation  Start Time: 0835  Stop Time: 0900  Time Calculation (min): 25 min         Assessment/Plan   PT Assessment  Barriers to Discharge Home: Physical needs  Physical Needs: Intermittent mobility assistance needed  End of Session Communication: Bedside nurse  Assessment Comment: Improved mobility distances, pt continues to present with decreased endurance requiring frequent rest breaks.  End of Session Patient Position: Up in chair, Alarm off, not on at start of session  PT Plan  Inpatient/Swing Bed or Outpatient: Inpatient  PT Plan  Treatment/Interventions: Bed mobility, Transfer training, Gait training, Strengthening, Endurance training, Therapeutic exercise, Range of motion, Balance training  PT Plan: Ongoing PT  PT Frequency: 4 times per week  PT Discharge Recommendations: Moderate intensity level of continued care  Equipment Recommended upon Discharge:  (HDRW vs cane)  PT Recommended Transfer Status:  (MIN/MOD A x 1-2)  PT - OK to Discharge: Yes      General Visit Information:   PT  Visit  PT Received On: 01/30/25  General  Prior to Session Communication: Bedside nurse  Patient Position Received: Bed, 3 rail up, Alarm off, not on at start of session  General Comment: Cleared by nursing to be seen for therapy, pt agreeable with tx, supine in bed upon arrival.    Subjective   Precautions:  Precautions  Medical Precautions: Fall precautions, Lymphedema precautions    Objective   Pain:  Pain Assessment  Pain Assessment: 0-10  0-10 (Numeric) Pain Score: 0 - No pain    Cognition:  Cognition  Overall Cognitive Status: Within Functional Limits  Orientation Level: Oriented X4     Postural Control:  Static Sitting Balance  Static Sitting-Balance Support: Feet supported, Bilateral upper extremity supported  Static Sitting-Level of Assistance: Close  supervision  Static Sitting-Comment/Number of Minutes: Good seated static balance.  Static Standing Balance  Static Standing-Balance Support: No upper extremity supported  Static Standing-Level of Assistance: Close supervision  Static Standing-Comment/Number of Minutes: Fair+ static standing balance.     Treatments:  Therapeutic Exercise  Therapeutic Exercise Performed: Yes  Therapeutic Exercise Activity 1: Bilateral ankle pumps x15  Therapeutic Exercise Activity 2: Bilateral hip flexion x15  Therapeutic Exercise Activity 3: Bilateral knee extension x15    Bed Mobility  Bed Mobility: Yes  Bed Mobility 1  Bed Mobility 1: Supine to sitting  Level of Assistance 1: Minimum assistance  Bed Mobility Comments 1: Min assist for bilateral LE's during supine to sit, good use of UE's on bed rail to assist trunk up.    Ambulation/Gait Training  Ambulation/Gait Training Performed: Yes  Ambulation/Gait Training 1  Surface 1: Level tile  Device 1: IV Pole  Assistance 1: Contact guard  Quality of Gait 1: Wide base of support, Diminished heel strike, Decreased step length  Comments/Distance (ft) 1: 40' pushing IV pole, multiple standing rest breaks due to increased fatigue, CGA for safety and balance.    Transfers  Transfer: Yes  Transfer 1  Transfer From 1: Sit to  Transfer to 1: Stand  Technique 1: Sit to stand, Stand to sit  Transfer Level of Assistance 1: Close supervision    Outcome Measures:  [facility] Basic Mobility  Turning from your back to your side while in a flat bed without using bedrails: A little  Moving from lying on your back to sitting on the side of a flat bed without using bedrails: A little  Moving to and from bed to chair (including a wheelchair): A little  Standing up from a chair using your arms (e.g. wheelchair or bedside chair): A little  To walk in hospital room: A little  Climbing 3-5 steps with railing: A lot  Basic Mobility - Total Score: 17    Education Documentation  Precautions, taught by Liliane Hernandez,  PTA at 1/30/2025  9:26 AM.  Learner: Patient  Readiness: Acceptance  Method: Explanation  Response: Verbalizes Understanding    Mobility Training, taught by Liliane Hernandez PTA at 1/30/2025  9:26 AM.  Learner: Patient  Readiness: Acceptance  Method: Explanation  Response: Verbalizes Understanding    Education Comments  01/30/25 0926 Liliane Hernandez PTA  Patient educated on the importance of mobility and participation with therapy. Educated on safety and use of call light for assistance.         Encounter Problems       Encounter Problems (Active)       Mobility       pt will ambulate 60' x 1 using HDRW vs cane MOD INDEPENDENT (Progressing)       Start:  01/29/25    Expected End:  02/12/25               PT Transfers       STG - Patient to transfer to and from sit to supine with SBA (Progressing)       Start:  01/29/25    Expected End:  02/12/25            STG - Patient will transfer sit to and from stand with MOD INDEPENDENT (Progressing)       Start:  01/29/25    Expected End:  02/12/25               Pain - Adult

## 2025-01-30 NOTE — PROGRESS NOTES
Roro Marshall is a 49 y.o. female on day 1 of admission presenting with Cellulitis of lower extremity, unspecified laterality.    Subjective   Temp max 37.4C.   Denies any chills  States she is feeling better  Denies any pain to her right leg  Denies nausea, vomiting, diarrhea, abdominal pain  Erythema to right leg is improving    Objective   Range of Vitals (last 24 hours)  Heart Rate:  [83-95]   Temp:  [36.8 °C (98.2 °F)-37.3 °C (99.1 °F)]   Resp:  [18-20]   BP: (100-117)/(52-74)   SpO2:  [97 %-100 %]   Daily Weight  01/28/25 : 145 kg (320 lb)    Body mass index is 58.53 kg/m².    Physical Exam  Constitutional:       Appearance: Normal appearance.   HENT:      Head: Normocephalic and atraumatic.   Eyes:      Extraocular Movements: Extraocular movements intact.      Conjunctiva/sclera: Conjunctivae normal.   Cardiovascular:      Rate and Rhythm: Normal rate and regular rhythm.   Pulmonary:      Effort: Pulmonary effort is normal.      Breath sounds: Normal breath sounds.   Abdominal:      General: Bowel sounds are normal.      Palpations: Abdomen is soft.   Musculoskeletal:         General: Normal range of motion.      Cervical back: Normal range of motion and neck supple.      Right lower leg: Edema present.      Left lower leg: Edema present.   Skin:     General: Skin is warm and dry.      Comments: Bilateral lower extremity wrappings in place-right upper leg erythema is resolving   Neurological:      General: No focal deficit present.      Mental Status: She is alert and oriented to person, place, and time.   Psychiatric:         Mood and Affect: Mood normal.         Behavior: Behavior normal.           Antibiotics  piperacillin-tazobactam - 4.5 gram/100 mL    Relevant Results  Labs  Results from last 72 hours   Lab Units 01/30/25  0611 01/28/25  1800   WBC AUTO x10*3/uL 21.1* 23.6*   HEMOGLOBIN g/dL 9.2* 12.0   HEMATOCRIT % 29.9* 38.8   PLATELETS AUTO x10*3/uL 165 231   NEUTROS PCT AUTO % 82.2 89.7   LYMPHS  PCT AUTO % 9.8 3.1   MONOS PCT AUTO % 5.7 4.0   EOS PCT AUTO % 0.7 0.0     Results from last 72 hours   Lab Units 01/30/25  0611 01/28/25  1800   SODIUM mmol/L 137 132*   POTASSIUM mmol/L 3.4* 3.9   CHLORIDE mmol/L 106 97*   CO2 mmol/L 26 27   BUN mg/dL 11 11   CREATININE mg/dL 0.46* 0.51   GLUCOSE mg/dL 95 122*   CALCIUM mg/dL 7.7* 9.0   ANION GAP mmol/L 8* 12   EGFR mL/min/1.73m*2 >90 >90     Results from last 72 hours   Lab Units 01/30/25  0611 01/28/25  1800   ALK PHOS U/L 76 87   BILIRUBIN TOTAL mg/dL 0.6 0.7   PROTEIN TOTAL g/dL 6.0* 8.1   ALT U/L 10 15   AST U/L 21 30   ALBUMIN g/dL 2.7* 3.6     Estimated Creatinine Clearance: 125 mL/min (A) (by C-G formula based on SCr of 0.46 mg/dL (L)).  C-Reactive Protein   Date Value Ref Range Status   03/12/2024 1.80 0.00 - 2.00 mg/dL Final     CRP   Date Value Ref Range Status   09/09/2023 1.0 0 - 2.0 MG/DL Final     Comment:     Performed at [address] [address]   04/12/2023 1.0 0 - 2.0 MG/DL Final     Comment:     Performed at [address] [address]     Microbiology  Susceptibility data from last 14 days.  Collected Specimen Info Organism   01/28/25 Blood culture from Peripheral Venipuncture Streptococcus pyogenes (Group A Streptococcus)   01/28/25 Blood culture from Peripheral Venipuncture Streptococcus pyogenes (Group A Streptococcus)       Imaging  XR chest 1 view    Result Date: 1/28/2025  Interpreted By:  Kyle Villarreal, STUDY: XR CHEST 1 VIEW;  1/28/2025 6:48 pm   INDICATION: Signs/Symptoms:fever.     COMPARISON: Radiographs of the chest dated 04/10/2019.   ACCESSION NUMBER(S): [id number]   ORDERING CLINICIAN: MARIELY MCKAY   FINDINGS: AP radiograph of the chest was provided.       CARDIOMEDIASTINAL SILHOUETTE: Cardiomediastinal silhouette is mildly enlarged.   LUNGS: Somewhat low lung volumes are present with pulmonary vascular congestion and bronchovascular crowding. Trace fluid is present along the  fissure in the right lung. No sizable consolidation or pleural effusion is noted.   ABDOMEN: No remarkable upper abdominal findings.   BONES: No acute osseous changes.       1.  Mild enlargement of the cardiomediastinal silhouette is pulmonary vascular congestion and trace fluid along the fissure in the right lung. No consolidation or sizable pleural effusion is evident. Correlate with fluid volume status.       MACRO: None   Signed by: Kyle Villarreal 1/28/2025 7:05 PM Dictation workstation:   [id number]     Assessment/Plan   Sepsis secondary to bacteremia, cellulitis- tachycardia, leukocytosis, reported fever at home-resolving  Group A streptococcus bacteremia  Right leg cellulitis-risk factor is lymphedema-resolving  Leukocytosis, secondary to infection-resolving  Lymphedema  Elevated CK-resolving     Continue IV zosyn-patient has a history of pseudomonas, wound culture pending  Discontinue IV daptomycin  Monitor  CK   Follow-up wound culture  Local care  Monitor WBC and temperature  Further recommendations based on wound culture result    Discussed with Dr Miramontes    Total time spent caring for the patient today was 20 minutes.  This includes time spent before the visit reviewing the chart, time spent during the visit, and time spent after the visit on documentation.        Meryl Brice, APRN-CNP

## 2025-01-30 NOTE — PROGRESS NOTES
01/30/25 1039   Discharge Planning   Expected Discharge Disposition Home     [facility] Spoke to patient, patient continues to decline SNF and HHC at this time     Medical barriers: awaiting final recommendations from ID   Pending transfer to [facility] due to insurance conflicts     NO BARRIERS TO DISCHARGE FROM CARE TRANSITIONS

## 2025-01-30 NOTE — PROGRESS NOTES
Rroo Marshall is a 49 y.o. female on day 1 of admission presenting with Cellulitis of lower extremity, unspecified laterality.      Subjective   Sitting up in chair. States she's feeling a lot better today. Denies pain. Tolerating PO.        Objective     Last Recorded Vitals  /58 (BP Location: Left arm, Patient Position: Lying)   Pulse 83   Temp 37.3 °C (99.1 °F) (Oral)   Resp 18   Wt 145 kg (320 lb)   SpO2 98%   Intake/Output last 3 Shifts:    Intake/Output Summary (Last 24 hours) at 1/30/2025 1045  Last data filed at 1/30/2025 0348  Gross per 24 hour   Intake 2295.01 ml   Output --   Net 2295.01 ml       Admission Weight  Weight: 145 kg (320 lb) (01/28/25 1713)    Daily Weight  01/28/25 : 145 kg (320 lb)    Image Results  XR chest 1 view  Narrative: Interpreted By:  Kyle Villarreal,   STUDY:  XR CHEST 1 VIEW;  1/28/2025 6:48 pm      INDICATION:  Signs/Symptoms:fever.          COMPARISON:  Radiographs of the chest dated 04/10/2019.      ACCESSION NUMBER(S):  [id number]      ORDERING CLINICIAN:  MARIELY MCKAY      FINDINGS:  AP radiograph of the chest was provided.              CARDIOMEDIASTINAL SILHOUETTE:  Cardiomediastinal silhouette is mildly enlarged.      LUNGS:  Somewhat low lung volumes are present with pulmonary vascular  congestion and bronchovascular crowding. Trace fluid is present along  the fissure in the right lung. No sizable consolidation or pleural  effusion is noted.      ABDOMEN:  No remarkable upper abdominal findings.      BONES:  No acute osseous changes.      Impression: 1.  Mild enlargement of the cardiomediastinal silhouette is pulmonary  vascular congestion and trace fluid along the fissure in the right  lung. No consolidation or sizable pleural effusion is evident.  Correlate with fluid volume status.              MACRO:  None      Signed by: Kyle Villarreal 1/28/2025 7:05 PM  Dictation workstation:   [id number]      Physical Exam  Constitutional:        General: She is not in acute distress.     Appearance: She is not toxic-appearing.   HENT:      Head: Normocephalic.      Mouth/Throat:      Pharynx: Oropharynx is clear.   Eyes:      General: No scleral icterus.  Cardiovascular:      Rate and Rhythm: Normal rate.   Pulmonary:      Effort: No respiratory distress.      Breath sounds: No wheezing.   Abdominal:      General: There is no distension.      Palpations: Abdomen is soft.   Musculoskeletal:      Right lower leg: Edema present.      Left lower leg: Edema present.   Skin:     Comments: B/L LE wrapped with compression bandaging. RLE cellulitis appears to be improved from yesterday   Neurological:      Mental Status: She is alert.         Relevant Results               Assessment/Plan        Assessment & Plan  Sepsis (Multi)  Meets criteria with source as cellulitis with fever, tachycardia, and leukocytosis  Consult ID  Consult wound care  Antibiotics per ID  Bacteremia due to Streptococcus  Blood cultures positive for strep pyogenes   Management per ID  Cellulitis of lower extremity, unspecified laterality  As above  Obesity  Significant comorbidity with chronic B/L lower extremity lymphedema  Rheumatoid arthritis of multiple sites with negative rheumatoid factor (Multi)  Follows with rheumatology  Continue plaquenil   Hypothyroidism  Continue synthroid     Plan:  Continue antibiotics per ID  Leukocytosis improving  PT/OT/OOB as able  Patient declining HHC and SNF placement  Awaiting transfer to [facility] due to insurance, pending bed availability               Lois Nava MD

## 2025-01-31 LAB
ALBUMIN SERPL BCP-MCNC: 2.7 G/DL (ref 3.4–5)
ALP SERPL-CCNC: 81 U/L (ref 33–110)
ALT SERPL W P-5'-P-CCNC: 11 U/L (ref 7–45)
ANION GAP SERPL CALCULATED.3IONS-SCNC: 10 MMOL/L (ref 10–20)
AST SERPL W P-5'-P-CCNC: 21 U/L (ref 9–39)
BASOPHILS # BLD AUTO: 0.08 X10*3/UL (ref 0–0.1)
BASOPHILS NFR BLD AUTO: 0.7 %
BILIRUB SERPL-MCNC: 0.6 MG/DL (ref 0–1.2)
BUN SERPL-MCNC: 9 MG/DL (ref 6–23)
CALCIUM SERPL-MCNC: 7.8 MG/DL (ref 8.6–10.3)
CHLORIDE SERPL-SCNC: 107 MMOL/L (ref 98–107)
CO2 SERPL-SCNC: 24 MMOL/L (ref 21–32)
CREAT SERPL-MCNC: 0.35 MG/DL (ref 0.5–1.05)
EGFRCR SERPLBLD CKD-EPI 2021: >90 ML/MIN/1.73M*2
EOSINOPHIL # BLD AUTO: 0.29 X10*3/UL (ref 0–0.7)
EOSINOPHIL NFR BLD AUTO: 2.5 %
ERYTHROCYTE [DISTWIDTH] IN BLOOD BY AUTOMATED COUNT: 15.9 % (ref 11.5–14.5)
GLUCOSE SERPL-MCNC: 93 MG/DL (ref 74–99)
HCT VFR BLD AUTO: 28.7 % (ref 36–46)
HGB BLD-MCNC: 8.7 G/DL (ref 12–16)
IMM GRANULOCYTES # BLD AUTO: 0.28 X10*3/UL (ref 0–0.7)
IMM GRANULOCYTES NFR BLD AUTO: 2.4 % (ref 0–0.9)
LYMPHOCYTES # BLD AUTO: 1.86 X10*3/UL (ref 1.2–4.8)
LYMPHOCYTES NFR BLD AUTO: 16.2 %
MCH RBC QN AUTO: 27.8 PG (ref 26–34)
MCHC RBC AUTO-ENTMCNC: 30.3 G/DL (ref 32–36)
MCV RBC AUTO: 92 FL (ref 80–100)
MONOCYTES # BLD AUTO: 0.67 X10*3/UL (ref 0.1–1)
MONOCYTES NFR BLD AUTO: 5.8 %
NEUTROPHILS # BLD AUTO: 8.32 X10*3/UL (ref 1.2–7.7)
NEUTROPHILS NFR BLD AUTO: 72.4 %
NRBC BLD-RTO: 0.3 /100 WBCS (ref 0–0)
PLATELET # BLD AUTO: 143 X10*3/UL (ref 150–450)
POTASSIUM SERPL-SCNC: 3.4 MMOL/L (ref 3.5–5.3)
PROT SERPL-MCNC: 5.9 G/DL (ref 6.4–8.2)
RBC # BLD AUTO: 3.13 X10*6/UL (ref 4–5.2)
SODIUM SERPL-SCNC: 138 MMOL/L (ref 136–145)
WBC # BLD AUTO: 11.5 X10*3/UL (ref 4.4–11.3)

## 2025-01-31 PROCEDURE — 2500000001 HC RX 250 WO HCPCS SELF ADMINISTERED DRUGS (ALT 637 FOR MEDICARE OP): Performed by: STUDENT IN AN ORGANIZED HEALTH CARE EDUCATION/TRAINING PROGRAM

## 2025-01-31 PROCEDURE — 2500000004 HC RX 250 GENERAL PHARMACY W/ HCPCS (ALT 636 FOR OP/ED)

## 2025-01-31 PROCEDURE — 97116 GAIT TRAINING THERAPY: CPT | Mod: GP,CQ

## 2025-01-31 PROCEDURE — 2500000004 HC RX 250 GENERAL PHARMACY W/ HCPCS (ALT 636 FOR OP/ED): Performed by: INTERNAL MEDICINE

## 2025-01-31 PROCEDURE — 87075 CULTR BACTERIA EXCEPT BLOOD: CPT | Mod: WESLAB | Performed by: NURSE PRACTITIONER

## 2025-01-31 PROCEDURE — 2500000002 HC RX 250 W HCPCS SELF ADMINISTERED DRUGS (ALT 637 FOR MEDICARE OP, ALT 636 FOR OP/ED): Performed by: STUDENT IN AN ORGANIZED HEALTH CARE EDUCATION/TRAINING PROGRAM

## 2025-01-31 PROCEDURE — 36415 COLL VENOUS BLD VENIPUNCTURE: CPT | Performed by: NURSE PRACTITIONER

## 2025-01-31 PROCEDURE — 36415 COLL VENOUS BLD VENIPUNCTURE: CPT | Performed by: STUDENT IN AN ORGANIZED HEALTH CARE EDUCATION/TRAINING PROGRAM

## 2025-01-31 PROCEDURE — 85025 COMPLETE CBC W/AUTO DIFF WBC: CPT | Performed by: STUDENT IN AN ORGANIZED HEALTH CARE EDUCATION/TRAINING PROGRAM

## 2025-01-31 PROCEDURE — 80053 COMPREHEN METABOLIC PANEL: CPT | Performed by: STUDENT IN AN ORGANIZED HEALTH CARE EDUCATION/TRAINING PROGRAM

## 2025-01-31 PROCEDURE — 1210000001 HC SEMI-PRIVATE ROOM DAILY

## 2025-01-31 PROCEDURE — 2500000001 HC RX 250 WO HCPCS SELF ADMINISTERED DRUGS (ALT 637 FOR MEDICARE OP): Performed by: INTERNAL MEDICINE

## 2025-01-31 PROCEDURE — 97110 THERAPEUTIC EXERCISES: CPT | Mod: GP,CQ

## 2025-01-31 PROCEDURE — 99232 SBSQ HOSP IP/OBS MODERATE 35: CPT | Performed by: STUDENT IN AN ORGANIZED HEALTH CARE EDUCATION/TRAINING PROGRAM

## 2025-01-31 PROCEDURE — 2500000002 HC RX 250 W HCPCS SELF ADMINISTERED DRUGS (ALT 637 FOR MEDICARE OP, ALT 636 FOR OP/ED): Performed by: INTERNAL MEDICINE

## 2025-01-31 RX ORDER — POTASSIUM CHLORIDE 20 MEQ/1
40 TABLET, EXTENDED RELEASE ORAL ONCE
Status: COMPLETED | OUTPATIENT
Start: 2025-01-31 | End: 2025-01-31

## 2025-01-31 RX ADMIN — PIPERACILLIN AND TAZOBACTAM 4.5 G: 4; .5 INJECTION, POWDER, FOR SOLUTION INTRAVENOUS at 15:16

## 2025-01-31 RX ADMIN — PIPERACILLIN AND TAZOBACTAM 4.5 G: 4; .5 INJECTION, POWDER, FOR SOLUTION INTRAVENOUS at 09:01

## 2025-01-31 RX ADMIN — LEVOTHYROXINE SODIUM 137 MCG: 0.14 TABLET ORAL at 05:40

## 2025-01-31 RX ADMIN — ENOXAPARIN SODIUM 60 MG: 60 INJECTION SUBCUTANEOUS at 05:40

## 2025-01-31 RX ADMIN — PIPERACILLIN AND TAZOBACTAM 4.5 G: 4; .5 INJECTION, POWDER, FOR SOLUTION INTRAVENOUS at 04:05

## 2025-01-31 RX ADMIN — ENOXAPARIN SODIUM 60 MG: 60 INJECTION SUBCUTANEOUS at 17:04

## 2025-01-31 RX ADMIN — POTASSIUM CHLORIDE 40 MEQ: 1500 TABLET, EXTENDED RELEASE ORAL at 10:17

## 2025-01-31 RX ADMIN — HYDROXYCHLOROQUINE SULFATE 200 MG: 200 TABLET ORAL at 09:00

## 2025-01-31 RX ADMIN — PIPERACILLIN AND TAZOBACTAM 4.5 G: 4; .5 INJECTION, POWDER, FOR SOLUTION INTRAVENOUS at 21:06

## 2025-01-31 ASSESSMENT — COGNITIVE AND FUNCTIONAL STATUS - GENERAL
MOVING TO AND FROM BED TO CHAIR: A LITTLE
MOVING TO AND FROM BED TO CHAIR: A LITTLE
STANDING UP FROM CHAIR USING ARMS: A LITTLE
MOBILITY SCORE: 23
HELP NEEDED FOR BATHING: A LITTLE
DAILY ACTIVITIY SCORE: 22
MOBILITY SCORE: 20
MOVING FROM LYING ON BACK TO SITTING ON SIDE OF FLAT BED WITH BEDRAILS: A LITTLE
CLIMB 3 TO 5 STEPS WITH RAILING: A LITTLE
TURNING FROM BACK TO SIDE WHILE IN FLAT BAD: A LITTLE
CLIMB 3 TO 5 STEPS WITH RAILING: A LOT
TURNING FROM BACK TO SIDE WHILE IN FLAT BAD: A LITTLE
WALKING IN HOSPITAL ROOM: A LITTLE
MOBILITY SCORE: 17
DAILY ACTIVITIY SCORE: 24
CLIMB 3 TO 5 STEPS WITH RAILING: A LOT
DRESSING REGULAR LOWER BODY CLOTHING: A LITTLE

## 2025-01-31 ASSESSMENT — PAIN SCALES - GENERAL
PAINLEVEL_OUTOF10: 0 - NO PAIN
PAINLEVEL_OUTOF10: 0 - NO PAIN

## 2025-01-31 ASSESSMENT — PAIN - FUNCTIONAL ASSESSMENT: PAIN_FUNCTIONAL_ASSESSMENT: 0-10

## 2025-01-31 ASSESSMENT — PAIN SCALES - WONG BAKER: WONGBAKER_NUMERICALRESPONSE: NO HURT

## 2025-01-31 NOTE — PROGRESS NOTES
Roro Marshall is a 49 y.o. female on day 2 of admission presenting with Cellulitis of lower extremity, unspecified laterality.      Subjective   Feels okay today. Denies any complaints currently. States she would like to go home but understands that she needs to stay in the hospital for ongoing IV abx. Tolerating PO. Denies GI upset or diarrhea from the antibiotics.        Objective     Last Recorded Vitals  BP (!) 102/42 (BP Location: Right arm, Patient Position: Lying)   Pulse 79   Temp 36.8 °C (98.2 °F) (Oral)   Resp 17   Wt 145 kg (320 lb)   SpO2 98%   Intake/Output last 3 Shifts:    Intake/Output Summary (Last 24 hours) at 1/31/2025 1145  Last data filed at 1/31/2025 0901  Gross per 24 hour   Intake 700 ml   Output --   Net 700 ml       Admission Weight  Weight: 145 kg (320 lb) (01/28/25 1713)    Daily Weight  01/28/25 : 145 kg (320 lb)    Image Results  XR chest 1 view  Narrative: Interpreted By:  Kyle Villarreal,   STUDY:  XR CHEST 1 VIEW;  1/28/2025 6:48 pm      INDICATION:  Signs/Symptoms:fever.          COMPARISON:  Radiographs of the chest dated 04/10/2019.      ACCESSION NUMBER(S):  [id number]      ORDERING CLINICIAN:  MARIELY MCKAY      FINDINGS:  AP radiograph of the chest was provided.              CARDIOMEDIASTINAL SILHOUETTE:  Cardiomediastinal silhouette is mildly enlarged.      LUNGS:  Somewhat low lung volumes are present with pulmonary vascular  congestion and bronchovascular crowding. Trace fluid is present along  the fissure in the right lung. No sizable consolidation or pleural  effusion is noted.      ABDOMEN:  No remarkable upper abdominal findings.      BONES:  No acute osseous changes.      Impression: 1.  Mild enlargement of the cardiomediastinal silhouette is pulmonary  vascular congestion and trace fluid along the fissure in the right  lung. No consolidation or sizable pleural effusion is evident.  Correlate with fluid volume status.              MACRO:  None       Signed by: Kyle Villarreal 1/28/2025 7:05 PM  Dictation workstation:   [id number]      Physical Exam  Constitutional:       General: She is not in acute distress.     Appearance: She is not toxic-appearing.   HENT:      Head: Normocephalic.      Mouth/Throat:      Pharynx: Oropharynx is clear.   Eyes:      General: No scleral icterus.  Cardiovascular:      Rate and Rhythm: Normal rate.   Pulmonary:      Effort: No respiratory distress.      Breath sounds: No wheezing.   Abdominal:      General: There is no distension.      Palpations: Abdomen is soft.   Skin:     Findings: Erythema (LLE > RLE) present.      Comments: B/L lower extremities with bandaging in place   Neurological:      Mental Status: She is alert and oriented to person, place, and time.         Relevant Results               Assessment/Plan        Assessment & Plan  Sepsis (Multi)  Meets criteria with source as cellulitis with fever, tachycardia, and leukocytosis  Consult ID  Consult wound care  Antibiotics per ID  Bacteremia due to Streptococcus  Blood cultures positive for strep pyogenes   Management per ID  Cellulitis of lower extremity, unspecified laterality  As above  Obesity  Significant comorbidity with chronic B/L lower extremity lymphedema  Rheumatoid arthritis of multiple sites with negative rheumatoid factor (Multi)  Follows with rheumatology  Continue plaquenil   Hypothyroidism  Continue synthroid     Plan:  Doing well, cellulitis appears to be improving, RLE with more improvement than LLE  Leukocytosis improved significantly  Continue antibiotics per ID  Replace potassium PRN  PT/OT/OOB  Anticipate discharge home with no needs when cleared by ID  Pending transfer to [facility] due to insurance conflicts            Lois Nava MD

## 2025-01-31 NOTE — CARE PLAN
The patient's goals for the shift include maintain fever free    The clinical goals for the shift include wound healing.

## 2025-01-31 NOTE — CONSULTS
Wound Care Consult     Visit Date: 1/31/2025      Patient Name: Roro Marshall         MRN: [id number]           YOB: 1975     Reason for Consult: Wounds of bilateral lower extremities      Wound History: defer to the patient's chart     Pertinent Labs:   Albumin   Date Value Ref Range Status   01/31/2025 2.7 (L) 3.4 - 5.0 g/dL Final     Albumin, SPE   Date Value Ref Range Status   07/18/2020 3.41  Reference range: 3.37 to 4.23  Unit: gm/dL    Final     Albumin/Protein Total, Ur   Date Value Ref Range Status   11/18/2019 41.1  Unit: %    Final       Wound Assessment:  Wound 01/29/25 Pretibial Left (Active)   Wound Image     01/31/25 1100       Wound 01/29/25 Pretibial Right (Active)   Wound Image     01/31/25 1049       Wound Team Summary Assessment: Wound care recommendations:  Wash affected bilateral lower extremities with soap and water and pat dry, Apply Medihoney to open wounds and cover with single layer xeroform  . Apply xeroform to entirety of  lower extremities, cover with ABD dressings and secure with roll gauze from metatarsals to the tibial tuberosity , then apply compression wraps as follows : 3 inch ace wrap from metatarsals to the mid ankle then apply 6 inch ace wraps  from mid ankle to the tibial tuberosity . This dressing and compression is to be done bilaterally. Change dressings every 2-3 days.     Wound Team Plan: ***     Gurmeet Agarwal RN  1/31/2025  2:17 PM

## 2025-01-31 NOTE — PROGRESS NOTES
Physical Therapy    Physical Therapy Treatment    Patient Name: Roro Marshall  MRN: [id number]  Department: [address]  Room: [address] [place] [address]  Today's Date: 1/31/2025  Time Calculation  Start Time: 0829  Stop Time: 0852  Time Calculation (min): 23 min         Assessment/Plan   PT Assessment  Barriers to Discharge Home: Physical needs  Physical Needs: Intermittent mobility assistance needed  End of Session Communication: Bedside nurse  Assessment Comment: Improved ambulation distances, improved endurance.  End of Session Patient Position: Bed, 2 rail up, Alarm off, not on at start of session  PT Plan  Inpatient/Swing Bed or Outpatient: Inpatient  PT Plan  Treatment/Interventions: Bed mobility, Transfer training, Gait training, Strengthening, Endurance training, Therapeutic exercise, Range of motion, Balance training  PT Plan: Ongoing PT  PT Frequency: 4 times per week  PT Discharge Recommendations: Moderate intensity level of continued care  Equipment Recommended upon Discharge:  (HDRW vs cane)  PT Recommended Transfer Status:  (MIN/MOD A x 1-2)  PT - OK to Discharge: Yes      General Visit Information:   PT  Visit  PT Received On: 01/31/25  General  Prior to Session Communication: Bedside nurse  Patient Position Received: Bed, 3 rail up, Alarm off, not on at start of session  General Comment: Cleared by nursing to be seen for therapy, pt agreeable with tx, supine in bed upon arrival.    Subjective   Precautions:  Precautions  Medical Precautions: Fall precautions, Lymphedema precautions    Objective   Pain:  Pain Assessment  Pain Assessment: 0-10  0-10 (Numeric) Pain Score: 0 - No pain    Cognition:  Cognition  Overall Cognitive Status: Within Functional Limits  Orientation Level: Oriented X4     Postural Control:  Static Sitting Balance  Static Sitting-Balance Support: Feet supported  Static Sitting-Level of Assistance: Independent  Static Sitting-Comment/Number of Minutes: Good seated static balance.  Static Standing  Balance  Static Standing-Balance Support: No upper extremity supported  Static Standing-Level of Assistance: Close supervision  Static Standing-Comment/Number of Minutes: Good static standing balance.     Treatments:  Therapeutic Exercise  Therapeutic Exercise Performed: Yes  Therapeutic Exercise Activity 1: Bilateral ankle pumps x15  Therapeutic Exercise Activity 2: Bilateral hip flexion x15  Therapeutic Exercise Activity 3: Bilateral knee extension x15  Therapeutic Exercise Activity 4: Resisted hip abd/add 15    Bed Mobility  Bed Mobility: Yes  Bed Mobility 1  Bed Mobility 1: Supine to sitting  Level of Assistance 1: Close supervision  Bed Mobility Comments 1: Increased time and effort to bring LE's off EOB. HOB elevated.    Ambulation/Gait Training  Ambulation/Gait Training Performed: Yes  Ambulation/Gait Training 1  Surface 1: Level tile  Device 1: No device  Assistance 1: Close supervision  Quality of Gait 1: Wide base of support, Diminished heel strike, Decreased step length  Comments/Distance (ft) 1: 50' without AD, improved ambulation distances without standing rest breaks.    Transfers  Transfer: Yes  Transfer 1  Transfer From 1: Sit to  Transfer to 1: Stand  Technique 1: Sit to stand, Stand to sit  Transfer Level of Assistance 1: Close supervision    Outcome Measures:  [facility] Basic Mobility  Turning from your back to your side while in a flat bed without using bedrails: A little  Moving from lying on your back to sitting on the side of a flat bed without using bedrails: A little  Moving to and from bed to chair (including a wheelchair): A little  Standing up from a chair using your arms (e.g. wheelchair or bedside chair): A little  To walk in hospital room: A little  Climbing 3-5 steps with railing: A lot  Basic Mobility - Total Score: 17    Education Documentation  Precautions, taught by Liliane Hernandez PTA at 1/31/2025 10:19 AM.  Learner: Patient  Readiness: Acceptance  Method: Explanation  Response:  Verbalizes Understanding    Mobility Training, taught by Liliane Hernandez PTA at 1/31/2025 10:19 AM.  Learner: Patient  Readiness: Acceptance  Method: Explanation  Response: Verbalizes Understanding    Education Comments  01/31/25 1019 Liliane Hernandez PTA  Patient educated on the importance of mobility and participation with therapy. Educated on safety and use of call light for assistance.         Encounter Problems       Encounter Problems (Active)       Mobility       pt will ambulate 60' x 1 using HDRW vs cane MOD INDEPENDENT (Progressing)       Start:  01/29/25    Expected End:  02/12/25               PT Transfers       STG - Patient to transfer to and from sit to supine with SBA (Progressing)       Start:  01/29/25    Expected End:  02/12/25            STG - Patient will transfer sit to and from stand with MOD INDEPENDENT (Progressing)       Start:  01/29/25    Expected End:  02/12/25               Pain - Adult

## 2025-01-31 NOTE — PROGRESS NOTES
Roro Marshall is a 49 y.o. female on day 2 of admission presenting with Cellulitis of lower extremity, unspecified laterality.      Subjective  Patient discussed with nursing, no acute events overnight  Repeat blood cultures drawn this morning  Tmax 37.3  She has slowly resolving left leg cellulitis  White count trending down    Objective        Last Recorded Vitals      1/29/2025     1:18 PM 1/29/2025     2:11 PM 1/29/2025    11:36 PM 1/30/2025     8:22 AM 1/30/2025     4:23 PM 1/31/2025    12:17 AM 1/31/2025     7:48 AM   Vitals   Systolic 107 117 100 105 123 110 102   Diastolic 60 74 52 58 67 59 42   BP Location  Left arm Left arm Left arm Left arm Right arm Right arm   Heart Rate 93 94 95 83 83 85 79   Temp  36.8 °C (98.2 °F) 37.3 °C (99.1 °F) 37.3 °C (99.1 °F) 36.5 °C (97.7 °F) 36.8 °C (98.2 °F) 36.8 °C (98.2 °F)   Resp  19 20 18 18 17 17       Imaging  No results found.      Relevant Results  Results for orders placed or performed during the hospital encounter of 01/28/25 (from the past 24 hours)   CBC and Auto Differential   Result Value Ref Range    WBC 11.5 (H) 4.4 - 11.3 x10*3/uL    nRBC 0.3 (H) 0.0 - 0.0 /100 WBCs    RBC 3.13 (L) 4.00 - 5.20 x10*6/uL    Hemoglobin 8.7 (L) 12.0 - 16.0 g/dL    Hematocrit 28.7 (L) 36.0 - 46.0 %    MCV 92 80 - 100 fL    MCH 27.8 26.0 - 34.0 pg    MCHC 30.3 (L) 32.0 - 36.0 g/dL    RDW 15.9 (H) 11.5 - 14.5 %    Platelets 143 (L) 150 - 450 x10*3/uL    Neutrophils % 72.4 40.0 - 80.0 %    Immature Granulocytes %, Automated 2.4 (H) 0.0 - 0.9 %    Lymphocytes % 16.2 13.0 - 44.0 %    Monocytes % 5.8 2.0 - 10.0 %    Eosinophils % 2.5 0.0 - 6.0 %    Basophils % 0.7 0.0 - 2.0 %    Neutrophils Absolute 8.32 (H) 1.20 - 7.70 x10*3/uL    Immature Granulocytes Absolute, Automated 0.28 0.00 - 0.70 x10*3/uL    Lymphocytes Absolute 1.86 1.20 - 4.80 x10*3/uL    Monocytes Absolute 0.67 0.10 - 1.00 x10*3/uL    Eosinophils Absolute 0.29 0.00 - 0.70 x10*3/uL    Basophils Absolute 0.08 0.00 -  0.10 x10*3/uL   Comprehensive Metabolic Panel   Result Value Ref Range    Glucose 93 74 - 99 mg/dL    Sodium 138 136 - 145 mmol/L    Potassium 3.4 (L) 3.5 - 5.3 mmol/L    Chloride 107 98 - 107 mmol/L    Bicarbonate 24 21 - 32 mmol/L    Anion Gap 10 10 - 20 mmol/L    Urea Nitrogen 9 6 - 23 mg/dL    Creatinine 0.35 (L) 0.50 - 1.05 mg/dL    eGFR >90 >60 mL/min/1.73m*2    Calcium 7.8 (L) 8.6 - 10.3 mg/dL    Albumin 2.7 (L) 3.4 - 5.0 g/dL    Alkaline Phosphatase 81 33 - 110 U/L    Total Protein 5.9 (L) 6.4 - 8.2 g/dL    AST 21 9 - 39 U/L    Bilirubin, Total 0.6 0.0 - 1.2 mg/dL    ALT 11 7 - 45 U/L       Physical Exam  Constitutional:       Appearance: Normal appearance.   HENT:      Head: Normocephalic and atraumatic.   Eyes:      Extraocular Movements: Extraocular movements intact.      Conjunctiva/sclera: Conjunctivae normal.   Cardiovascular:      Rate and Rhythm: Normal rate and regular rhythm.   Pulmonary:      Effort: Pulmonary effort is normal.      Breath sounds: Normal breath sounds.   Abdominal:      General: Bowel sounds are normal.      Palpations: Abdomen is soft.   Musculoskeletal:         General: Normal range of motion.      Cervical back: Normal range of motion and neck supple.      Right lower leg: Edema present.      Left lower leg: Edema present.   Skin:     General: Skin is warm and dry.      Comments: Bilateral lower extremity wrappings in place-right upper leg erythema is resolving   Neurological:      General: No focal deficit present.      Mental Status: She is alert and oriented to person, place, and time.   Psychiatric:         Mood and Affect: Mood normal.         Behavior: Behavior normal.   Susceptibility data from last 90 days.  Collected Specimen Info Organism   01/28/25 Blood culture from Peripheral Venipuncture Streptococcus pyogenes (Group A Streptococcus)   01/28/25 Blood culture from Peripheral Venipuncture Streptococcus pyogenes (Group A Streptococcus)     piperacillin-tazobactam -  4.5 gram/100 mL      Assessment/Plan  Sepsis secondary to bacteremia, cellulitis- tachycardia, leukocytosis, reported fever at home-resolving  Group A streptococcus bacteremia  Right leg cellulitis-risk factor is   Patient has a history of pseudomonas  lymphedema-resolving  Leukocytosis, secondary to infection-resolving  Lymphedema  Elevated CK-resolving     Continue IV zosyn  Repeat blood cultures 1/31  Follow-up wound culture  Local care  Monitor WBC and temperature  Further recommendations based on wound culture result          Total time spent caring for the patient today was 20 minutes.  This includes time spent before the visit reviewing the chart, time spent during the visit, and time spent after the visit on documentation.

## 2025-01-31 NOTE — NURSING NOTE
Nurse assumed care of patient, patient awake in bed, marco light within reach. Patient is still awaiting bed at [facility].

## 2025-01-31 NOTE — PROGRESS NOTES
01/31/25 0957   Discharge Planning   Expected Discharge Disposition Home     patient continues to decline SNF and HHC at this time      Medical barriers: awaiting final recommendations from ID, repeat cultures to be drawn today  Pending transfer to [facility] due to insurance conflicts      NO BARRIERS TO DISCHARGE FROM CARE TRANSITIONS

## 2025-02-01 LAB
ALBUMIN SERPL BCP-MCNC: 2.6 G/DL (ref 3.4–5)
ALP SERPL-CCNC: 78 U/L (ref 33–110)
ALT SERPL W P-5'-P-CCNC: 11 U/L (ref 7–45)
ANION GAP SERPL CALCULATED.3IONS-SCNC: 8 MMOL/L (ref 10–20)
AST SERPL W P-5'-P-CCNC: 18 U/L (ref 9–39)
BACTERIA BLD AEROBE CULT: ABNORMAL
BACTERIA BLD CULT: ABNORMAL
BASOPHILS # BLD AUTO: 0.09 X10*3/UL (ref 0–0.1)
BASOPHILS NFR BLD AUTO: 1 %
BILIRUB SERPL-MCNC: 0.6 MG/DL (ref 0–1.2)
BUN SERPL-MCNC: 7 MG/DL (ref 6–23)
CALCIUM SERPL-MCNC: 7.7 MG/DL (ref 8.6–10.3)
CHLORIDE SERPL-SCNC: 106 MMOL/L (ref 98–107)
CO2 SERPL-SCNC: 28 MMOL/L (ref 21–32)
CREAT SERPL-MCNC: 0.39 MG/DL (ref 0.5–1.05)
EGFRCR SERPLBLD CKD-EPI 2021: >90 ML/MIN/1.73M*2
EOSINOPHIL # BLD AUTO: 0.35 X10*3/UL (ref 0–0.7)
EOSINOPHIL NFR BLD AUTO: 3.8 %
ERYTHROCYTE [DISTWIDTH] IN BLOOD BY AUTOMATED COUNT: 15.8 % (ref 11.5–14.5)
GLUCOSE SERPL-MCNC: 91 MG/DL (ref 74–99)
GRAM STN SPEC: ABNORMAL
GRAM STN SPEC: ABNORMAL
HCT VFR BLD AUTO: 28.3 % (ref 36–46)
HGB BLD-MCNC: 8.6 G/DL (ref 12–16)
IMM GRANULOCYTES # BLD AUTO: 0.45 X10*3/UL (ref 0–0.7)
IMM GRANULOCYTES NFR BLD AUTO: 4.9 % (ref 0–0.9)
LYMPHOCYTES # BLD AUTO: 1.81 X10*3/UL (ref 1.2–4.8)
LYMPHOCYTES NFR BLD AUTO: 19.5 %
MCH RBC QN AUTO: 28.1 PG (ref 26–34)
MCHC RBC AUTO-ENTMCNC: 30.4 G/DL (ref 32–36)
MCV RBC AUTO: 93 FL (ref 80–100)
MONOCYTES # BLD AUTO: 0.74 X10*3/UL (ref 0.1–1)
MONOCYTES NFR BLD AUTO: 8 %
NEUTROPHILS # BLD AUTO: 5.83 X10*3/UL (ref 1.2–7.7)
NEUTROPHILS NFR BLD AUTO: 62.8 %
NRBC BLD-RTO: 0.5 /100 WBCS (ref 0–0)
PLATELET # BLD AUTO: 159 X10*3/UL (ref 150–450)
POTASSIUM SERPL-SCNC: 3.7 MMOL/L (ref 3.5–5.3)
PROT SERPL-MCNC: 5.9 G/DL (ref 6.4–8.2)
RBC # BLD AUTO: 3.06 X10*6/UL (ref 4–5.2)
SODIUM SERPL-SCNC: 138 MMOL/L (ref 136–145)
WBC # BLD AUTO: 9.3 X10*3/UL (ref 4.4–11.3)

## 2025-02-01 PROCEDURE — 99232 SBSQ HOSP IP/OBS MODERATE 35: CPT | Performed by: STUDENT IN AN ORGANIZED HEALTH CARE EDUCATION/TRAINING PROGRAM

## 2025-02-01 PROCEDURE — 2500000004 HC RX 250 GENERAL PHARMACY W/ HCPCS (ALT 636 FOR OP/ED): Performed by: INTERNAL MEDICINE

## 2025-02-01 PROCEDURE — 85025 COMPLETE CBC W/AUTO DIFF WBC: CPT | Performed by: STUDENT IN AN ORGANIZED HEALTH CARE EDUCATION/TRAINING PROGRAM

## 2025-02-01 PROCEDURE — 80053 COMPREHEN METABOLIC PANEL: CPT | Performed by: STUDENT IN AN ORGANIZED HEALTH CARE EDUCATION/TRAINING PROGRAM

## 2025-02-01 PROCEDURE — 2500000001 HC RX 250 WO HCPCS SELF ADMINISTERED DRUGS (ALT 637 FOR MEDICARE OP): Performed by: STUDENT IN AN ORGANIZED HEALTH CARE EDUCATION/TRAINING PROGRAM

## 2025-02-01 PROCEDURE — 2500000004 HC RX 250 GENERAL PHARMACY W/ HCPCS (ALT 636 FOR OP/ED)

## 2025-02-01 PROCEDURE — 2500000002 HC RX 250 W HCPCS SELF ADMINISTERED DRUGS (ALT 637 FOR MEDICARE OP, ALT 636 FOR OP/ED): Performed by: STUDENT IN AN ORGANIZED HEALTH CARE EDUCATION/TRAINING PROGRAM

## 2025-02-01 PROCEDURE — 1210000001 HC SEMI-PRIVATE ROOM DAILY

## 2025-02-01 PROCEDURE — 2500000001 HC RX 250 WO HCPCS SELF ADMINISTERED DRUGS (ALT 637 FOR MEDICARE OP): Performed by: INTERNAL MEDICINE

## 2025-02-01 PROCEDURE — 2500000002 HC RX 250 W HCPCS SELF ADMINISTERED DRUGS (ALT 637 FOR MEDICARE OP, ALT 636 FOR OP/ED): Performed by: INTERNAL MEDICINE

## 2025-02-01 PROCEDURE — 36415 COLL VENOUS BLD VENIPUNCTURE: CPT | Performed by: STUDENT IN AN ORGANIZED HEALTH CARE EDUCATION/TRAINING PROGRAM

## 2025-02-01 RX ORDER — VANCOMYCIN 2 G/400ML
2 INJECTION, SOLUTION INTRAVENOUS EVERY 8 HOURS
Status: DISCONTINUED | OUTPATIENT
Start: 2025-02-01 | End: 2025-02-02

## 2025-02-01 RX ORDER — POTASSIUM CHLORIDE 20 MEQ/1
40 TABLET, EXTENDED RELEASE ORAL ONCE
Status: COMPLETED | OUTPATIENT
Start: 2025-02-01 | End: 2025-02-01

## 2025-02-01 RX ORDER — VANCOMYCIN HYDROCHLORIDE 1 G/20ML
INJECTION, POWDER, LYOPHILIZED, FOR SOLUTION INTRAVENOUS DAILY PRN
Status: DISCONTINUED | OUTPATIENT
Start: 2025-02-01 | End: 2025-02-02

## 2025-02-01 RX ADMIN — ENOXAPARIN SODIUM 60 MG: 60 INJECTION SUBCUTANEOUS at 05:45

## 2025-02-01 RX ADMIN — PIPERACILLIN SODIUM AND TAZOBACTAM SODIUM 4.5 G: 4; .5 INJECTION, SOLUTION INTRAVENOUS at 16:11

## 2025-02-01 RX ADMIN — PIPERACILLIN SODIUM AND TAZOBACTAM SODIUM 4.5 G: 4; .5 INJECTION, SOLUTION INTRAVENOUS at 04:55

## 2025-02-01 RX ADMIN — LEVOTHYROXINE SODIUM 137 MCG: 0.14 TABLET ORAL at 05:45

## 2025-02-01 RX ADMIN — POTASSIUM CHLORIDE 40 MEQ: 1500 TABLET, EXTENDED RELEASE ORAL at 09:08

## 2025-02-01 RX ADMIN — ENOXAPARIN SODIUM 60 MG: 60 INJECTION SUBCUTANEOUS at 21:02

## 2025-02-01 RX ADMIN — PIPERACILLIN AND TAZOBACTAM 4.5 G: 4; .5 INJECTION, POWDER, FOR SOLUTION INTRAVENOUS at 21:03

## 2025-02-01 RX ADMIN — PIPERACILLIN SODIUM AND TAZOBACTAM SODIUM 4.5 G: 4; .5 INJECTION, SOLUTION INTRAVENOUS at 10:06

## 2025-02-01 RX ADMIN — HYDROXYCHLOROQUINE SULFATE 200 MG: 200 TABLET ORAL at 09:08

## 2025-02-01 ASSESSMENT — COGNITIVE AND FUNCTIONAL STATUS - GENERAL
MOBILITY SCORE: 24
DAILY ACTIVITIY SCORE: 24

## 2025-02-01 ASSESSMENT — PAIN - FUNCTIONAL ASSESSMENT: PAIN_FUNCTIONAL_ASSESSMENT: 0-10

## 2025-02-01 ASSESSMENT — PAIN SCALES - GENERAL
PAINLEVEL_OUTOF10: 0 - NO PAIN

## 2025-02-01 NOTE — CARE PLAN
The patient's goals for the shift include maintain fever free    The clinical goals for the shift include wound healing

## 2025-02-01 NOTE — PROGRESS NOTES
Roro Marshall is a 49 y.o. female on day 3 of admission presenting with Cellulitis of lower extremity, unspecified laterality.    Subjective   Interval History:   Afebrile, no chills  No leg pain  No chest pain or shortness of breath.  No nausea vomiting      Review of Systems    Objective   Range of Vitals (last 24 hours)  Heart Rate:  [72-80]   Temp:  [36.8 °C (98.2 °F)-37.1 °C (98.8 °F)]   Resp:  [16-17]   BP: (113-132)/(58-91)   SpO2:  [95 %-100 %]   Daily Weight  01/28/25 : 145 kg (320 lb)    Body mass index is 58.53 kg/m².    Physical Exam  Constitutional:       Appearance: Normal appearance.   HENT:      Head: Normocephalic and atraumatic.   Eyes:      Extraocular Movements: Extraocular movements intact.      Conjunctiva/sclera: Conjunctivae normal.   Cardiovascular:      Rate and Rhythm: Normal rate and regular rhythm.   Pulmonary:      Effort: Pulmonary effort is normal.      Breath sounds: Normal breath sounds.   Abdominal:      General: Bowel sounds are normal.      Palpations: Abdomen is soft.   Musculoskeletal:         General: Normal range of motion.      Cervical back: Normal range of motion and neck supple.      Right lower leg: Edema present.      Left lower leg: Edema present.   Skin:     General: Skin is warm and dry.      Comments: Bilateral lower extremity wrappings in place-right upper leg erythema is resolving   Neurological:      General: No focal deficit present.      Mental Status: She is alert and oriented to person, place, and time.   Psychiatric:         Mood and Affect: Mood normal.         Behavior: Behavior normal.     Antibiotics  piperacillin-tazobactam - 4.5 gram/100 mL    Relevant Results  Labs  Results from last 72 hours   Lab Units 02/01/25  0604 01/31/25  0645 01/30/25  0611   WBC AUTO x10*3/uL 9.3 11.5* 21.1*   HEMOGLOBIN g/dL 8.6* 8.7* 9.2*   HEMATOCRIT % 28.3* 28.7* 29.9*   PLATELETS AUTO x10*3/uL 159 143* 165   NEUTROS PCT AUTO % 62.8 72.4 82.2   LYMPHS PCT AUTO % 19.5  16.2 9.8   MONOS PCT AUTO % 8.0 5.8 5.7   EOS PCT AUTO % 3.8 2.5 0.7     Results from last 72 hours   Lab Units 02/01/25  0604 01/31/25  0646 01/30/25  0611   SODIUM mmol/L 138 138 137   POTASSIUM mmol/L 3.7 3.4* 3.4*   CHLORIDE mmol/L 106 107 106   CO2 mmol/L 28 24 26   BUN mg/dL 7 9 11   CREATININE mg/dL 0.39* 0.35* 0.46*   GLUCOSE mg/dL 91 93 95   CALCIUM mg/dL 7.7* 7.8* 7.7*   ANION GAP mmol/L 8* 10 8*   EGFR mL/min/1.73m*2 >90 >90 >90     Results from last 72 hours   Lab Units 02/01/25  0604 01/31/25  0646 01/30/25  0611   ALK PHOS U/L 78 81 76   BILIRUBIN TOTAL mg/dL 0.6 0.6 0.6   PROTEIN TOTAL g/dL 5.9* 5.9* 6.0*   ALT U/L 11 11 10   AST U/L 18 21 21   ALBUMIN g/dL 2.6* 2.7* 2.7*     Estimated Creatinine Clearance: 125 mL/min (A) (by C-G formula based on SCr of 0.39 mg/dL (L)).  C-Reactive Protein   Date Value Ref Range Status   03/12/2024 1.80 0.00 - 2.00 mg/dL Final     CRP   Date Value Ref Range Status   09/09/2023 1.0 0 - 2.0 MG/DL Final     Comment:     Performed at [address] [address]   04/12/2023 1.0 0 - 2.0 MG/DL Final     Comment:     Performed at [address] [address]     Microbiology  Susceptibility data from last 14 days.  Collected Specimen Info Organism Clindamycin Erythromycin Penicillin Tetracycline   01/29/25 Tissue/Biopsy from Wound/Tissue Staphylococcus aureus         Pseudomonas aeruginosa       01/28/25 Blood culture from Peripheral Venipuncture Streptococcus pyogenes (Group A Streptococcus)  S  S  S  S   01/28/25 Blood culture from Peripheral Venipuncture Streptococcus pyogenes (Group A Streptococcus)         Imaging  XR chest 1 view    Result Date: 1/28/2025  Interpreted By:  Kyle Villarreal, STUDY: XR CHEST 1 VIEW;  1/28/2025 6:48 pm   INDICATION: Signs/Symptoms:fever.     COMPARISON: Radiographs of the chest dated 04/10/2019.   ACCESSION NUMBER(S): [id number]   ORDERING CLINICIAN: MARIELY MCKAY   FINDINGS: AP radiograph of  the chest was provided.       CARDIOMEDIASTINAL SILHOUETTE: Cardiomediastinal silhouette is mildly enlarged.   LUNGS: Somewhat low lung volumes are present with pulmonary vascular congestion and bronchovascular crowding. Trace fluid is present along the fissure in the right lung. No sizable consolidation or pleural effusion is noted.   ABDOMEN: No remarkable upper abdominal findings.   BONES: No acute osseous changes.       1.  Mild enlargement of the cardiomediastinal silhouette is pulmonary vascular congestion and trace fluid along the fissure in the right lung. No consolidation or sizable pleural effusion is evident. Correlate with fluid volume status.       MACRO: None   Signed by: Kyle Villarreal 1/28/2025 7:05 PM Dictation workstation:   [id number]    Assessment/Plan   Sepsis secondary to bacteremia, cellulitis- tachycardia, leukocytosis, reported fever at home-resolved  Group A streptococcus bacteremia  Right leg cellulitis-risk factor is   Lymphedema, wound culture growing Staphylococcus aureus and Pseudomonas  Leukocytosis, secondary to infection-resolved  Lymphedema  Elevated CK-resolving     Continue IV zosyn  IV vancomycin-coverage for MRSA  Follow-up repeat blood cultures 1/31  Follow-up final wound culture result  Local care  Monitor WBC and temperature  Further recommendations based on wound culture result       Maurice Miramontes MD

## 2025-02-01 NOTE — PROGRESS NOTES
Roro Marshall is a 49 y.o. female on day 3 of admission presenting with Cellulitis of lower extremity, unspecified laterality.      Subjective   States she feels well today. Denies any complaints. Tolerating PO. Tolerating antibiotics - denies diarrhea/GI upset.        Objective     Last Recorded Vitals  /58 (BP Location: Right arm, Patient Position: Lying)   Pulse 72   Temp 37.1 °C (98.8 °F) (Oral)   Resp 16   Wt 145 kg (320 lb)   SpO2 95%   Intake/Output last 3 Shifts:    Intake/Output Summary (Last 24 hours) at 2/1/2025 0950  Last data filed at 2/1/2025 0600  Gross per 24 hour   Intake 380 ml   Output --   Net 380 ml       Admission Weight  Weight: 145 kg (320 lb) (01/28/25 1713)    Daily Weight  01/28/25 : 145 kg (320 lb)    Image Results  XR chest 1 view  Narrative: Interpreted By:  Kyle Villarreal,   STUDY:  XR CHEST 1 VIEW;  1/28/2025 6:48 pm      INDICATION:  Signs/Symptoms:fever.          COMPARISON:  Radiographs of the chest dated 04/10/2019.      ACCESSION NUMBER(S):  [id number]      ORDERING CLINICIAN:  MARIELY MCKAY      FINDINGS:  AP radiograph of the chest was provided.              CARDIOMEDIASTINAL SILHOUETTE:  Cardiomediastinal silhouette is mildly enlarged.      LUNGS:  Somewhat low lung volumes are present with pulmonary vascular  congestion and bronchovascular crowding. Trace fluid is present along  the fissure in the right lung. No sizable consolidation or pleural  effusion is noted.      ABDOMEN:  No remarkable upper abdominal findings.      BONES:  No acute osseous changes.      Impression: 1.  Mild enlargement of the cardiomediastinal silhouette is pulmonary  vascular congestion and trace fluid along the fissure in the right  lung. No consolidation or sizable pleural effusion is evident.  Correlate with fluid volume status.              MACRO:  None      Signed by: Kyle Villarreal 1/28/2025 7:05 PM  Dictation workstation:   [id number]      Physical  Exam  Constitutional:       General: She is not in acute distress.     Appearance: She is not toxic-appearing.   HENT:      Head: Normocephalic.      Mouth/Throat:      Pharynx: Oropharynx is clear.   Eyes:      General: No scleral icterus.  Cardiovascular:      Rate and Rhythm: Normal rate.   Pulmonary:      Effort: No respiratory distress.      Breath sounds: No wheezing.   Abdominal:      General: There is no distension.      Palpations: Abdomen is soft.   Skin:     Comments: B/L lower extremities with compression bandaging. RLE cellulitis appears to be mostly resolved. LLE cellulitis still with some areas of redness in the lateral side of the left leg but appears to be improved from yesterday   Neurological:      Mental Status: She is alert and oriented to person, place, and time.         Relevant Results               Assessment/Plan        Assessment & Plan  Sepsis (Multi)  Meets criteria with source as cellulitis with fever, tachycardia, and leukocytosis  Consult ID  Consult wound care  Antibiotics per ID  Bacteremia due to Streptococcus  Blood cultures positive for strep pyogenes   Repeat blood cultures pending   Management per ID  Cellulitis of lower extremity, unspecified laterality  Wound culture positive for pseudomonas  Antibiotics per ID  Obesity  Significant comorbidity with chronic B/L lower extremity lymphedema  Rheumatoid arthritis of multiple sites with negative rheumatoid factor (Multi)  Follows with rheumatology  Continue plaquenil   Hypothyroidism  Continue synthroid     Plan:  Doing well, cellulitis improving   Continue antibiotics per ID  Follow up repeat blood cultures  PT/OT/OOB  Anticipate discharge home with no need when cleared by ID              Lois Nava MD

## 2025-02-01 NOTE — NURSING NOTE
Assumed care of pt. Pt resting in bed with bed low and locked. Call light within reach. Pt has no needs or complaints at this time. Will continue to monitor.

## 2025-02-02 VITALS
WEIGHT: 293 LBS | RESPIRATION RATE: 16 BRPM | DIASTOLIC BLOOD PRESSURE: 75 MMHG | SYSTOLIC BLOOD PRESSURE: 133 MMHG | OXYGEN SATURATION: 100 % | HEART RATE: 73 BPM | HEIGHT: 62 IN | TEMPERATURE: 97.7 F | BODY MASS INDEX: 53.92 KG/M2

## 2025-02-02 DIAGNOSIS — L03.115 CELLULITIS OF RIGHT LOWER EXTREMITY: ICD-10-CM

## 2025-02-02 LAB
ALBUMIN SERPL BCP-MCNC: 2.8 G/DL (ref 3.4–5)
ALP SERPL-CCNC: 82 U/L (ref 33–110)
ALT SERPL W P-5'-P-CCNC: 13 U/L (ref 7–45)
ANION GAP SERPL CALCULATED.3IONS-SCNC: 10 MMOL/L (ref 10–20)
AST SERPL W P-5'-P-CCNC: 18 U/L (ref 9–39)
BACTERIA BLD AEROBE CULT: ABNORMAL
BACTERIA BLD CULT: ABNORMAL
BACTERIA BLD CULT: NORMAL
BACTERIA BLD CULT: NORMAL
BACTERIA SPEC CULT: ABNORMAL
BACTERIA SPEC CULT: ABNORMAL
BASOPHILS # BLD MANUAL: 0.2 X10*3/UL (ref 0–0.1)
BASOPHILS NFR BLD MANUAL: 2 %
BILIRUB SERPL-MCNC: 0.8 MG/DL (ref 0–1.2)
BUN SERPL-MCNC: 6 MG/DL (ref 6–23)
CALCIUM SERPL-MCNC: 8.2 MG/DL (ref 8.6–10.3)
CHLORIDE SERPL-SCNC: 106 MMOL/L (ref 98–107)
CK SERPL-CCNC: 46 U/L (ref 0–215)
CO2 SERPL-SCNC: 26 MMOL/L (ref 21–32)
CREAT SERPL-MCNC: 0.41 MG/DL (ref 0.5–1.05)
EGFRCR SERPLBLD CKD-EPI 2021: >90 ML/MIN/1.73M*2
EOSINOPHIL # BLD MANUAL: 0.51 X10*3/UL (ref 0–0.7)
EOSINOPHIL NFR BLD MANUAL: 5 %
ERYTHROCYTE [DISTWIDTH] IN BLOOD BY AUTOMATED COUNT: 15.5 % (ref 11.5–14.5)
GLUCOSE SERPL-MCNC: 89 MG/DL (ref 74–99)
GRAM STN SPEC: ABNORMAL
HCT VFR BLD AUTO: 29.8 % (ref 36–46)
HGB BLD-MCNC: 9 G/DL (ref 12–16)
IMM GRANULOCYTES # BLD AUTO: 0.71 X10*3/UL (ref 0–0.7)
IMM GRANULOCYTES NFR BLD AUTO: 7 % (ref 0–0.9)
LYMPHOCYTES # BLD MANUAL: 1.02 X10*3/UL (ref 1.2–4.8)
LYMPHOCYTES NFR BLD MANUAL: 10 %
MCH RBC QN AUTO: 28.1 PG (ref 26–34)
MCHC RBC AUTO-ENTMCNC: 30.2 G/DL (ref 32–36)
MCV RBC AUTO: 93 FL (ref 80–100)
METAMYELOCYTES # BLD MANUAL: 0.2 X10*3/UL
METAMYELOCYTES NFR BLD MANUAL: 2 %
MONOCYTES # BLD MANUAL: 0.51 X10*3/UL (ref 0.1–1)
MONOCYTES NFR BLD MANUAL: 5 %
MYELOCYTES # BLD MANUAL: 0.1 X10*3/UL
MYELOCYTES NFR BLD MANUAL: 1 %
NEUTROPHILS # BLD MANUAL: 7.54 X10*3/UL (ref 1.2–7.7)
NEUTS BAND # BLD MANUAL: 0.2 X10*3/UL (ref 0–0.7)
NEUTS BAND NFR BLD MANUAL: 2 %
NEUTS SEG # BLD MANUAL: 7.34 X10*3/UL (ref 1.2–7)
NEUTS SEG NFR BLD MANUAL: 72 %
NRBC BLD-RTO: 0.5 /100 WBCS (ref 0–0)
PLATELET # BLD AUTO: 172 X10*3/UL (ref 150–450)
POTASSIUM SERPL-SCNC: 3.8 MMOL/L (ref 3.5–5.3)
PROMYELOCYTES # BLD MANUAL: 0.1 X10*3/UL
PROMYELOCYTES NFR BLD MANUAL: 1 %
PROT SERPL-MCNC: 6.4 G/DL (ref 6.4–8.2)
RBC # BLD AUTO: 3.2 X10*6/UL (ref 4–5.2)
RBC MORPH BLD: ABNORMAL
SODIUM SERPL-SCNC: 138 MMOL/L (ref 136–145)
TOTAL CELLS COUNTED BLD: 100
VANCOMYCIN SERPL-MCNC: 23.6 UG/ML (ref 5–20)
WBC # BLD AUTO: 10.2 X10*3/UL (ref 4.4–11.3)

## 2025-02-02 PROCEDURE — 80053 COMPREHEN METABOLIC PANEL: CPT | Performed by: STUDENT IN AN ORGANIZED HEALTH CARE EDUCATION/TRAINING PROGRAM

## 2025-02-02 PROCEDURE — 80202 ASSAY OF VANCOMYCIN: CPT | Performed by: INTERNAL MEDICINE

## 2025-02-02 PROCEDURE — 82550 ASSAY OF CK (CPK): CPT | Performed by: INTERNAL MEDICINE

## 2025-02-02 PROCEDURE — 2500000001 HC RX 250 WO HCPCS SELF ADMINISTERED DRUGS (ALT 637 FOR MEDICARE OP): Performed by: INTERNAL MEDICINE

## 2025-02-02 PROCEDURE — 2500000002 HC RX 250 W HCPCS SELF ADMINISTERED DRUGS (ALT 637 FOR MEDICARE OP, ALT 636 FOR OP/ED): Performed by: INTERNAL MEDICINE

## 2025-02-02 PROCEDURE — 36415 COLL VENOUS BLD VENIPUNCTURE: CPT | Performed by: INTERNAL MEDICINE

## 2025-02-02 PROCEDURE — 2500000004 HC RX 250 GENERAL PHARMACY W/ HCPCS (ALT 636 FOR OP/ED): Performed by: INTERNAL MEDICINE

## 2025-02-02 PROCEDURE — 85007 BL SMEAR W/DIFF WBC COUNT: CPT | Performed by: STUDENT IN AN ORGANIZED HEALTH CARE EDUCATION/TRAINING PROGRAM

## 2025-02-02 PROCEDURE — 85027 COMPLETE CBC AUTOMATED: CPT | Performed by: STUDENT IN AN ORGANIZED HEALTH CARE EDUCATION/TRAINING PROGRAM

## 2025-02-02 PROCEDURE — 2500000001 HC RX 250 WO HCPCS SELF ADMINISTERED DRUGS (ALT 637 FOR MEDICARE OP): Performed by: STUDENT IN AN ORGANIZED HEALTH CARE EDUCATION/TRAINING PROGRAM

## 2025-02-02 PROCEDURE — 99232 SBSQ HOSP IP/OBS MODERATE 35: CPT | Performed by: STUDENT IN AN ORGANIZED HEALTH CARE EDUCATION/TRAINING PROGRAM

## 2025-02-02 PROCEDURE — 1210000001 HC SEMI-PRIVATE ROOM DAILY

## 2025-02-02 RX ORDER — VANCOMYCIN 1.25 G/250ML
1750 INJECTION, SOLUTION INTRAVENOUS EVERY 12 HOURS
Status: DISCONTINUED | OUTPATIENT
Start: 2025-02-02 | End: 2025-02-02

## 2025-02-02 RX ADMIN — VANCOMYCIN 2 G: 2 INJECTION, SOLUTION INTRAVENOUS at 00:30

## 2025-02-02 RX ADMIN — PIPERACILLIN AND TAZOBACTAM 4.5 G: 4; .5 INJECTION, POWDER, FOR SOLUTION INTRAVENOUS at 23:55

## 2025-02-02 RX ADMIN — ENOXAPARIN SODIUM 60 MG: 60 INJECTION SUBCUTANEOUS at 05:18

## 2025-02-02 RX ADMIN — PIPERACILLIN AND TAZOBACTAM 4.5 G: 4; .5 INJECTION, POWDER, FOR SOLUTION INTRAVENOUS at 13:10

## 2025-02-02 RX ADMIN — ENOXAPARIN SODIUM 60 MG: 60 INJECTION SUBCUTANEOUS at 18:03

## 2025-02-02 RX ADMIN — ACETAMINOPHEN 650 MG: 325 TABLET ORAL at 18:02

## 2025-02-02 RX ADMIN — PIPERACILLIN AND TAZOBACTAM 4.5 G: 4; .5 INJECTION, POWDER, FOR SOLUTION INTRAVENOUS at 18:22

## 2025-02-02 RX ADMIN — PIPERACILLIN AND TAZOBACTAM 4.5 G: 4; .5 INJECTION, POWDER, FOR SOLUTION INTRAVENOUS at 04:53

## 2025-02-02 RX ADMIN — LEVOTHYROXINE SODIUM 137 MCG: 0.14 TABLET ORAL at 05:18

## 2025-02-02 RX ADMIN — HYDROXYCHLOROQUINE SULFATE 200 MG: 200 TABLET ORAL at 08:13

## 2025-02-02 RX ADMIN — VANCOMYCIN 2 G: 2 INJECTION, SOLUTION INTRAVENOUS at 08:13

## 2025-02-02 ASSESSMENT — COGNITIVE AND FUNCTIONAL STATUS - GENERAL
DAILY ACTIVITIY SCORE: 24
MOBILITY SCORE: 24

## 2025-02-02 ASSESSMENT — PAIN SCALES - GENERAL: PAINLEVEL_OUTOF10: 0 - NO PAIN

## 2025-02-02 ASSESSMENT — PAIN - FUNCTIONAL ASSESSMENT: PAIN_FUNCTIONAL_ASSESSMENT: 0-10

## 2025-02-02 NOTE — PROGRESS NOTES
Roro Marshall is a 49 y.o. female on day 4 of admission presenting with Cellulitis of lower extremity, unspecified laterality.    Subjective   Interval History:           Review of Systems   Cardiovascular:  Positive for leg swelling.   All other systems reviewed and are negative.      Objective   Range of Vitals (last 24 hours)  Heart Rate:  [71-78]   Temp:  [36.5 °C (97.7 °F)-37.1 °C (98.7 °F)]   Resp:  [18]   BP: (108-124)/(57-91)   SpO2:  [100 %]   Daily Weight  01/28/25 : 145 kg (320 lb)    Body mass index is 58.53 kg/m².    Physical Exam  Constitutional:       Appearance: Normal appearance.   HENT:      Head: Normocephalic and atraumatic.   Eyes:      Extraocular Movements: Extraocular movements intact.      Conjunctiva/sclera: Conjunctivae normal.   Cardiovascular:      Rate and Rhythm: Normal rate and regular rhythm.   Pulmonary:      Effort: Pulmonary effort is normal.      Breath sounds: Normal breath sounds.   Abdominal:      General: Bowel sounds are normal.      Palpations: Abdomen is soft.   Musculoskeletal:         General: Normal range of motion.      Cervical back: Normal range of motion and neck supple.      Right lower leg: Edema present.      Left lower leg: Edema present.   Skin:     General: Skin is warm and dry.      Comments: Bilateral lower extremity wrappings in place-right upper leg erythema is resolving   Neurological:      General: No focal deficit present.      Mental Status: She is alert and oriented to person, place, and time.   Psychiatric:         Mood and Affect: Mood normal.         Behavior: Behavior normal.        Antibiotics  piperacillin-tazobactam - 4.5 gram/100 mL  vancomycin - 2 gram/400 mL    Relevant Results  Labs  Results from last 72 hours   Lab Units 02/02/25  0623 02/01/25  0604 01/31/25  0645   WBC AUTO x10*3/uL 10.2 9.3 11.5*   HEMOGLOBIN g/dL 9.0* 8.6* 8.7*   HEMATOCRIT % 29.8* 28.3* 28.7*   PLATELETS AUTO x10*3/uL 172 159 143*   NEUTROS PCT AUTO %  --  62.8  72.4   LYMPHO PCT MAN % 10.0  --   --    LYMPHS PCT AUTO %  --  19.5 16.2   MONO PCT MAN % 5.0  --   --    MONOS PCT AUTO %  --  8.0 5.8   EOSINO PCT MAN % 5.0  --   --    EOS PCT AUTO %  --  3.8 2.5     Results from last 72 hours   Lab Units 02/02/25  0623 02/01/25  0604 01/31/25  0646   SODIUM mmol/L 138 138 138   POTASSIUM mmol/L 3.8 3.7 3.4*   CHLORIDE mmol/L 106 106 107   CO2 mmol/L 26 28 24   BUN mg/dL 6 7 9   CREATININE mg/dL 0.41* 0.39* 0.35*   GLUCOSE mg/dL 89 91 93   CALCIUM mg/dL 8.2* 7.7* 7.8*   ANION GAP mmol/L 10 8* 10   EGFR mL/min/1.73m*2 >90 >90 >90     Results from last 72 hours   Lab Units 02/02/25  0623 02/01/25  0604 01/31/25  0646   ALK PHOS U/L 82 78 81   BILIRUBIN TOTAL mg/dL 0.8 0.6 0.6   PROTEIN TOTAL g/dL 6.4 5.9* 5.9*   ALT U/L 13 11 11   AST U/L 18 18 21   ALBUMIN g/dL 2.8* 2.6* 2.7*     Estimated Creatinine Clearance: 125 mL/min (A) (by C-G formula based on SCr of 0.41 mg/dL (L)).  C-Reactive Protein   Date Value Ref Range Status   03/12/2024 1.80 0.00 - 2.00 mg/dL Final     CRP   Date Value Ref Range Status   09/09/2023 1.0 0 - 2.0 MG/DL Final     Comment:     Performed at [address] [address]   04/12/2023 1.0 0 - 2.0 MG/DL Final     Comment:     Performed at [address] [address]     Microbiology  Susceptibility data from last 14 days.  Collected Specimen Info Organism Clindamycin Erythromycin Penicillin Tetracycline   01/29/25 Tissue/Biopsy from Wound/Tissue Staphylococcus aureus         Pseudomonas aeruginosa       01/28/25 Blood culture from Peripheral Venipuncture Streptococcus pyogenes (Group A Streptococcus)  S  S  S  S   01/28/25 Blood culture from Peripheral Venipuncture Streptococcus pyogenes (Group A Streptococcus)           Imaging  XR chest 1 view    Result Date: 1/28/2025  Interpreted By:  Kyle Villarreal, STUDY: XR CHEST 1 VIEW;  1/28/2025 6:48 pm   INDICATION: Signs/Symptoms:fever.     COMPARISON: Radiographs of  the chest dated 04/10/2019.   ACCESSION NUMBER(S): [id number]   ORDERING CLINICIAN: MARIELY MCKAY   FINDINGS: AP radiograph of the chest was provided.       CARDIOMEDIASTINAL SILHOUETTE: Cardiomediastinal silhouette is mildly enlarged.   LUNGS: Somewhat low lung volumes are present with pulmonary vascular congestion and bronchovascular crowding. Trace fluid is present along the fissure in the right lung. No sizable consolidation or pleural effusion is noted.   ABDOMEN: No remarkable upper abdominal findings.   BONES: No acute osseous changes.       1.  Mild enlargement of the cardiomediastinal silhouette is pulmonary vascular congestion and trace fluid along the fissure in the right lung. No consolidation or sizable pleural effusion is evident. Correlate with fluid volume status.       MACRO: None   Signed by: Kyle Villarreal 1/28/2025 7:05 PM Dictation workstation:   [id number]     Assessment/Plan   Sepsis secondary to bacteremia, cellulitis- tachycardia, leukocytosis, reported fever at home-resolved  Group A streptococcus bacteremia  Right leg cellulitis-risk factor is   Lymphedema, wound culture growing Staphylococcus aureus and Pseudomonas  Leukocytosis, secondary to infection-resolved  Lymphedema  Elevated CK-resolving     Continue IV zosyn  Discontinue vancomycin  Follow-up repeat blood cultures 1/31  Local care  Monitor WBC and temperature  Long-term plan is IV Zosyn till 2/14/2025  PICC line placement after case management as determined coverage    Maurice Miramontes MD

## 2025-02-02 NOTE — CONSULTS
Vancomycin Dosing by Pharmacy- INITIAL    Roro Marshall is a 49 y.o. year old female who Pharmacy has been consulted for vancomycin dosing for cellulitis, skin and soft tissue. Based on the patient's indication and renal status this patient will be dosed based on a goal AUC of 400-600.     Renal function is currently stable.    Visit Vitals  BP (!) 123/91 (BP Location: Left arm, Patient Position: Sitting)   Pulse 78   Temp 37.1 °C (98.7 °F) (Oral)   Resp 18        Lab Results   Component Value Date    CREATININE 0.39 (L) 2025    CREATININE 0.35 (L) 2025    CREATININE 0.46 (L) 2025    CREATININE 0.51 2025        Patient weight is as follows:   Vitals:    25 1713   Weight: 145 kg (320 lb)       Cultures:  Susceptibility data for the encounter in last 14 days.  Collected Specimen Info Organism Clindamycin Erythromycin Penicillin Tetracycline   25 Tissue/Biopsy from Wound/Tissue Staphylococcus aureus         Pseudomonas aeruginosa       25 Blood culture from Peripheral Venipuncture Streptococcus pyogenes (Group A Streptococcus)  S  S  S  S   25 Blood culture from Peripheral Venipuncture Streptococcus pyogenes (Group A Streptococcus)             I/O last 3 completed shifts:  In: 980 (6.8 mL/kg) [P.O.:280; IV Piggyback:700]  Out: - (0 mL/kg)   Weight: 145.1 kg   I/O during current shift:  No intake/output data recorded.    Temp (24hrs), Av.9 °C (98.4 °F), Min:36.5 °C (97.7 °F), Max:37.1 °C (98.8 °F)         Assessment/Plan     Patient will not be given a loading dose.  Will initiate vancomycin maintenance,  2000 mg every 8 hours.    This dosing regimen is predicted by [employer] to result in the following pharmacokinetic parameters:  Loading dose: N/A  Regimen: 2000 mg IV every 8 hours.  Start time: 22:01 on 2025  Exposure target: AUC24 (range)400-600 mg/L.hr   TVS28-90: 404 mg/L.hr  AUC24,ss: 404 mg/L.hr  Probability of AUC24 > 400: 51 %  Ctrough,ss: 7.5  mg/L  Probability of Ctrough,ss > 20: 17 %      Follow-up level will be ordered on 2/2 at 0500 unless clinically indicated sooner.  Will continue to monitor renal function daily while on vancomycin and order serum creatinine at least every 48 hours if not already ordered.  Follow for continued vancomycin needs, clinical response, and signs/symptoms of toxicity.       Claus Armstrong, PharmD

## 2025-02-02 NOTE — PROGRESS NOTES
Vancomycin Dosing by Pharmacy- FOLLOW UP    Roro Marshall is a 49 y.o. year old female who Pharmacy has been consulted for vancomycin dosing for cellulitis, skin and soft tissue. Based on the patient's indication and renal status this patient is being dosed based on a goal AUC of 400-600.     Renal function is currently stable.    Current vancomycin dose: 2000 mg given every 8 hours    Estimated vancomycin AUC on current dose: 949 mg/L.hr     Visit Vitals  /69 (BP Location: Right arm, Patient Position: Sitting)   Pulse 70   Temp 36.7 °C (98.1 °F) (Oral)   Resp 16        Lab Results   Component Value Date    CREATININE 0.41 (L) 2025    CREATININE 0.39 (L) 2025    CREATININE 0.35 (L) 2025    CREATININE 0.46 (L) 2025        Patient weight is as follows:   Vitals:    25 1713   Weight: 145 kg (320 lb)       Cultures:  Susceptibility data for the encounter in last 14 days.  Collected Specimen Info Organism Aztreonam Cefepime Ceftazidime Ciprofloxacin Clindamycin Erythromycin Levofloxacin Penicillin Piperacillin/Tazobactam Tetracycline Tobramycin   25 Tissue/Biopsy from Wound/Tissue Pseudomonas aeruginosa  R  S  S  S    I   S   S     Staphylococcus aureus              25 Blood culture from Peripheral Venipuncture Streptococcus pyogenes (Group A Streptococcus)      S  S   S   S    25 Blood culture from Peripheral Venipuncture Streptococcus pyogenes (Group A Streptococcus)                   I/O last 3 completed shifts:  In: 700 (4.8 mL/kg) [P.O.:400; IV Piggyback:300]  Out: - (0 mL/kg)   Weight: 145.1 kg   I/O during current shift:  I/O this shift:  In: 250 [P.O.:250]  Out: -     Temp (24hrs), Av.7 °C (98.1 °F), Min:36.5 °C (97.7 °F), Max:37.1 °C (98.7 °F)      Assessment/Plan    Above goal AUC. Orders placed for new vancomcyin regimen of 1750 mg every 12 hours to begin at 2100 2/2.    This dosing regimen is predicted by [employer] to result in the following  pharmacokinetic parameters:  Loading dose: N/A  Regimen: 1750 mg IV every 12 hours.  Start time: 20:13 on 02/02/2025  Exposure target: AUC24 (range)400-600 mg/L.hr   RLR15-50: 555 mg/L.hr  AUC24,ss: 554 mg/L.hr  Probability of AUC24 > 400: 100 %  Ctrough,ss: 8.4 mg/L  Probability of Ctrough,ss > 20: 2 %      The next level will be obtained on 2/3 at 0500. May be obtained sooner if clinically indicated.   Will continue to monitor renal function daily while on vancomycin and order serum creatinine at least every 48 hours if not already ordered.  Follow for continued vancomycin needs, clinical response, and signs/symptoms of toxicity.       Sienna Gonzalez, PharmD

## 2025-02-02 NOTE — ASSESSMENT & PLAN NOTE
Blood cultures positive for strep pyogenes   Repeat blood cultures no growth at 1 day   Management per ID

## 2025-02-02 NOTE — CARE PLAN
The patient's goals for the shift include wound care    The clinical goals for the shift include Maintain safety    Over the shift, the patient did not make progress toward the following goals. Barriers to progression include slow wound healing . Recommendations to address these barriers include adequate wound care and nutrition.

## 2025-02-02 NOTE — PROGRESS NOTES
Roro Marshall is a 49 y.o. female on day 4 of admission presenting with Cellulitis of lower extremity, unspecified laterality.      Subjective   Feels sad that she's still in the hospital. Really wants to go home but understands that she needs IV antibiotics. Tolerating PO. Tolerating antibiotics, denies GI upset and diarrhea.        Objective     Last Recorded Vitals  /69 (BP Location: Right arm, Patient Position: Sitting)   Pulse 70   Temp 36.7 °C (98.1 °F) (Oral)   Resp 16   Wt 145 kg (320 lb)   SpO2 99%   Intake/Output last 3 Shifts:    Intake/Output Summary (Last 24 hours) at 2/2/2025 1042  Last data filed at 2/2/2025 0952  Gross per 24 hour   Intake 470 ml   Output --   Net 470 ml       Admission Weight  Weight: 145 kg (320 lb) (01/28/25 1713)    Daily Weight  01/28/25 : 145 kg (320 lb)    Image Results  XR chest 1 view  Narrative: Interpreted By:  Kyle Villarreal,   STUDY:  XR CHEST 1 VIEW;  1/28/2025 6:48 pm      INDICATION:  Signs/Symptoms:fever.          COMPARISON:  Radiographs of the chest dated 04/10/2019.      ACCESSION NUMBER(S):  [id number]      ORDERING CLINICIAN:  MARIELY MCKAY      FINDINGS:  AP radiograph of the chest was provided.              CARDIOMEDIASTINAL SILHOUETTE:  Cardiomediastinal silhouette is mildly enlarged.      LUNGS:  Somewhat low lung volumes are present with pulmonary vascular  congestion and bronchovascular crowding. Trace fluid is present along  the fissure in the right lung. No sizable consolidation or pleural  effusion is noted.      ABDOMEN:  No remarkable upper abdominal findings.      BONES:  No acute osseous changes.      Impression: 1.  Mild enlargement of the cardiomediastinal silhouette is pulmonary  vascular congestion and trace fluid along the fissure in the right  lung. No consolidation or sizable pleural effusion is evident.  Correlate with fluid volume status.              MACRO:  None      Signed by: Kyle Villarreal 1/28/2025 7:05  PM  Dictation workstation:   [id number]      Physical Exam  Constitutional:       General: She is not in acute distress.     Appearance: She is not toxic-appearing.   HENT:      Head: Normocephalic.      Mouth/Throat:      Pharynx: Oropharynx is clear.   Eyes:      General: No scleral icterus.  Cardiovascular:      Rate and Rhythm: Normal rate.   Pulmonary:      Effort: No respiratory distress.      Breath sounds: No wheezing.   Abdominal:      General: There is no distension.      Palpations: Abdomen is soft.   Skin:     Comments: B/L lower extremities wrapped with compression dressings. RLE cellulitis resolving. LLE still with an area of redness on the lateral aspect of the leg but does appear to be improving   Neurological:      Mental Status: She is alert and oriented to person, place, and time.         Relevant Results               Assessment/Plan      Assessment & Plan  Sepsis (Multi)  Meets criteria with source as cellulitis with fever, tachycardia, and leukocytosis  Consult ID  Consult wound care  Antibiotics per ID  Bacteremia due to Streptococcus  Blood cultures positive for strep pyogenes   Repeat blood cultures no growth at 1 day   Management per ID  Cellulitis of lower extremity, unspecified laterality  Wound culture positive for pseudomonas and staph aureus   Antibiotics per ID  Obesity  Significant comorbidity with chronic B/L lower extremity lymphedema  Rheumatoid arthritis of multiple sites with negative rheumatoid factor (Multi)  Follows with rheumatology  Continue plaquenil   Hypothyroidism  Continue synthroid     Plan:  Continue antibiotics per ID  PT/OT/OOB as able  Anticipate discharge home with no needs when cleared by ID              Lois Nava MD

## 2025-02-03 ENCOUNTER — APPOINTMENT (OUTPATIENT)
Dept: CARDIOLOGY | Facility: HOSPITAL | Age: 50
End: 2025-02-03
Payer: COMMERCIAL

## 2025-02-03 ENCOUNTER — APPOINTMENT (OUTPATIENT)
Dept: WOUND CARE | Facility: HOSPITAL | Age: 50
End: 2025-02-03
Payer: COMMERCIAL

## 2025-02-03 LAB
ALBUMIN SERPL BCP-MCNC: 2.9 G/DL (ref 3.4–5)
ALP SERPL-CCNC: 87 U/L (ref 33–110)
ALT SERPL W P-5'-P-CCNC: 14 U/L (ref 7–45)
ANION GAP SERPL CALCULATED.3IONS-SCNC: 10 MMOL/L (ref 10–20)
AST SERPL W P-5'-P-CCNC: 23 U/L (ref 9–39)
BASOPHILS # BLD MANUAL: 0 X10*3/UL (ref 0–0.1)
BASOPHILS NFR BLD MANUAL: 0 %
BILIRUB SERPL-MCNC: 0.7 MG/DL (ref 0–1.2)
BUN SERPL-MCNC: 5 MG/DL (ref 6–23)
CALCIUM SERPL-MCNC: 8.2 MG/DL (ref 8.6–10.3)
CHLORIDE SERPL-SCNC: 107 MMOL/L (ref 98–107)
CO2 SERPL-SCNC: 27 MMOL/L (ref 21–32)
CREAT SERPL-MCNC: 0.41 MG/DL (ref 0.5–1.05)
EGFRCR SERPLBLD CKD-EPI 2021: >90 ML/MIN/1.73M*2
EOSINOPHIL # BLD MANUAL: 0.59 X10*3/UL (ref 0–0.7)
EOSINOPHIL NFR BLD MANUAL: 6 %
ERYTHROCYTE [DISTWIDTH] IN BLOOD BY AUTOMATED COUNT: 15.7 % (ref 11.5–14.5)
GLUCOSE SERPL-MCNC: 86 MG/DL (ref 74–99)
HCT VFR BLD AUTO: 30.4 % (ref 36–46)
HGB BLD-MCNC: 9.3 G/DL (ref 12–16)
IMM GRANULOCYTES # BLD AUTO: 0.9 X10*3/UL (ref 0–0.7)
IMM GRANULOCYTES NFR BLD AUTO: 9.2 % (ref 0–0.9)
LYMPHOCYTES # BLD MANUAL: 2.35 X10*3/UL (ref 1.2–4.8)
LYMPHOCYTES NFR BLD MANUAL: 24 %
MCH RBC QN AUTO: 28.4 PG (ref 26–34)
MCHC RBC AUTO-ENTMCNC: 30.6 G/DL (ref 32–36)
MCV RBC AUTO: 93 FL (ref 80–100)
METAMYELOCYTES # BLD MANUAL: 0.2 X10*3/UL
METAMYELOCYTES NFR BLD MANUAL: 2 %
MONOCYTES # BLD MANUAL: 0.39 X10*3/UL (ref 0.1–1)
MONOCYTES NFR BLD MANUAL: 4 %
MYELOCYTES # BLD MANUAL: 0.39 X10*3/UL
MYELOCYTES NFR BLD MANUAL: 4 %
NEUTROPHILS # BLD MANUAL: 5.88 X10*3/UL (ref 1.2–7.7)
NEUTS BAND # BLD MANUAL: 0.29 X10*3/UL (ref 0–0.7)
NEUTS BAND NFR BLD MANUAL: 3 %
NEUTS SEG # BLD MANUAL: 5.59 X10*3/UL (ref 1.2–7)
NEUTS SEG NFR BLD MANUAL: 57 %
NRBC BLD-RTO: 0.4 /100 WBCS (ref 0–0)
PLATELET # BLD AUTO: 179 X10*3/UL (ref 150–450)
POLYCHROMASIA BLD QL SMEAR: NORMAL
POTASSIUM SERPL-SCNC: 4.1 MMOL/L (ref 3.5–5.3)
PROT SERPL-MCNC: 6.7 G/DL (ref 6.4–8.2)
RBC # BLD AUTO: 3.28 X10*6/UL (ref 4–5.2)
RBC MORPH BLD: NORMAL
SODIUM SERPL-SCNC: 140 MMOL/L (ref 136–145)
TOTAL CELLS COUNTED BLD: 100
WBC # BLD AUTO: 9.8 X10*3/UL (ref 4.4–11.3)

## 2025-02-03 PROCEDURE — 87081 CULTURE SCREEN ONLY: CPT | Mod: WESLAB | Performed by: INTERNAL MEDICINE

## 2025-02-03 PROCEDURE — 85027 COMPLETE CBC AUTOMATED: CPT | Performed by: STUDENT IN AN ORGANIZED HEALTH CARE EDUCATION/TRAINING PROGRAM

## 2025-02-03 PROCEDURE — 80053 COMPREHEN METABOLIC PANEL: CPT | Performed by: STUDENT IN AN ORGANIZED HEALTH CARE EDUCATION/TRAINING PROGRAM

## 2025-02-03 PROCEDURE — 97116 GAIT TRAINING THERAPY: CPT | Mod: GP,CQ

## 2025-02-03 PROCEDURE — 99232 SBSQ HOSP IP/OBS MODERATE 35: CPT | Performed by: INTERNAL MEDICINE

## 2025-02-03 PROCEDURE — 85007 BL SMEAR W/DIFF WBC COUNT: CPT | Performed by: STUDENT IN AN ORGANIZED HEALTH CARE EDUCATION/TRAINING PROGRAM

## 2025-02-03 PROCEDURE — 02HV33Z INSERTION OF INFUSION DEVICE INTO SUPERIOR VENA CAVA, PERCUTANEOUS APPROACH: ICD-10-PCS | Performed by: INTERNAL MEDICINE

## 2025-02-03 PROCEDURE — 1210000001 HC SEMI-PRIVATE ROOM DAILY

## 2025-02-03 PROCEDURE — 36415 COLL VENOUS BLD VENIPUNCTURE: CPT | Performed by: STUDENT IN AN ORGANIZED HEALTH CARE EDUCATION/TRAINING PROGRAM

## 2025-02-03 PROCEDURE — 97530 THERAPEUTIC ACTIVITIES: CPT | Mod: GP,CQ

## 2025-02-03 PROCEDURE — 2500000004 HC RX 250 GENERAL PHARMACY W/ HCPCS (ALT 636 FOR OP/ED): Performed by: INTERNAL MEDICINE

## 2025-02-03 PROCEDURE — 2780000003 HC OR 278 NO HCPCS

## 2025-02-03 PROCEDURE — C1751 CATH, INF, PER/CENT/MIDLINE: HCPCS

## 2025-02-03 PROCEDURE — 2500000001 HC RX 250 WO HCPCS SELF ADMINISTERED DRUGS (ALT 637 FOR MEDICARE OP): Performed by: STUDENT IN AN ORGANIZED HEALTH CARE EDUCATION/TRAINING PROGRAM

## 2025-02-03 PROCEDURE — 2500000001 HC RX 250 WO HCPCS SELF ADMINISTERED DRUGS (ALT 637 FOR MEDICARE OP): Performed by: INTERNAL MEDICINE

## 2025-02-03 PROCEDURE — 2500000002 HC RX 250 W HCPCS SELF ADMINISTERED DRUGS (ALT 637 FOR MEDICARE OP, ALT 636 FOR OP/ED): Performed by: INTERNAL MEDICINE

## 2025-02-03 PROCEDURE — 36573 INSJ PICC RS&I 5 YR+: CPT

## 2025-02-03 RX ORDER — LIDOCAINE HYDROCHLORIDE 10 MG/ML
5 INJECTION, SOLUTION INFILTRATION; PERINEURAL ONCE
Status: DISCONTINUED | OUTPATIENT
Start: 2025-02-03 | End: 2025-02-06 | Stop reason: HOSPADM

## 2025-02-03 RX ADMIN — ENOXAPARIN SODIUM 60 MG: 60 INJECTION SUBCUTANEOUS at 05:53

## 2025-02-03 RX ADMIN — PIPERACILLIN SODIUM AND TAZOBACTAM SODIUM 4.5 G: 4; .5 INJECTION, SOLUTION INTRAVENOUS at 07:01

## 2025-02-03 RX ADMIN — PIPERACILLIN SODIUM AND TAZOBACTAM SODIUM 4.5 G: 4; .5 INJECTION, SOLUTION INTRAVENOUS at 18:38

## 2025-02-03 RX ADMIN — HYDROXYCHLOROQUINE SULFATE 200 MG: 200 TABLET ORAL at 08:43

## 2025-02-03 RX ADMIN — LEVOTHYROXINE SODIUM 137 MCG: 0.14 TABLET ORAL at 05:53

## 2025-02-03 RX ADMIN — PIPERACILLIN SODIUM AND TAZOBACTAM SODIUM 4.5 G: 4; .5 INJECTION, SOLUTION INTRAVENOUS at 23:24

## 2025-02-03 RX ADMIN — ENOXAPARIN SODIUM 60 MG: 60 INJECTION SUBCUTANEOUS at 18:38

## 2025-02-03 RX ADMIN — PIPERACILLIN SODIUM AND TAZOBACTAM SODIUM 4.5 G: 4; .5 INJECTION, SOLUTION INTRAVENOUS at 12:04

## 2025-02-03 ASSESSMENT — COGNITIVE AND FUNCTIONAL STATUS - GENERAL
MOVING TO AND FROM BED TO CHAIR: A LITTLE
DAILY ACTIVITIY SCORE: 24
STANDING UP FROM CHAIR USING ARMS: A LITTLE
MOBILITY SCORE: 17
MOVING FROM LYING ON BACK TO SITTING ON SIDE OF FLAT BED WITH BEDRAILS: A LITTLE
MOBILITY SCORE: 24
TURNING FROM BACK TO SIDE WHILE IN FLAT BAD: A LITTLE
CLIMB 3 TO 5 STEPS WITH RAILING: A LOT
WALKING IN HOSPITAL ROOM: A LITTLE

## 2025-02-03 ASSESSMENT — PAIN SCALES - GENERAL
PAINLEVEL_OUTOF10: 0 - NO PAIN
PAINLEVEL_OUTOF10: 0 - NO PAIN

## 2025-02-03 ASSESSMENT — PAIN - FUNCTIONAL ASSESSMENT: PAIN_FUNCTIONAL_ASSESSMENT: 0-10

## 2025-02-03 ASSESSMENT — PAIN SCALES - WONG BAKER: WONGBAKER_NUMERICALRESPONSE: NO HURT

## 2025-02-03 NOTE — CARE PLAN
The patient's goals for the shift include wound care    The clinical goals for the shift include WOUND CARE      Problem: Pain - Adult  Goal: Verbalizes/displays adequate comfort level or baseline comfort level  Outcome: Progressing     Problem: Safety - Adult  Goal: Free from fall injury  Outcome: Progressing     Problem: Discharge Planning  Goal: Discharge to home or other facility with appropriate resources  Outcome: Progressing     Problem: Chronic Conditions and Co-morbidities  Goal: Patient's chronic conditions and co-morbidity symptoms are monitored and maintained or improved  Outcome: Progressing     Problem: Nutrition  Goal: Nutrient intake appropriate for maintaining nutritional needs  Outcome: Progressing     Problem: Skin  Goal: Decreased wound size/increased tissue granulation at next dressing change  Outcome: Progressing  Goal: Participates in plan/prevention/treatment measures  Outcome: Progressing  Goal: Prevent/manage excess moisture  Outcome: Progressing  Goal: Prevent/minimize sheer/friction injuries  Outcome: Progressing  Goal: Promote/optimize nutrition  Outcome: Progressing  Goal: Promote skin healing  Outcome: Progressing     Problem: Fall/Injury  Goal: Not fall by end of shift  Outcome: Progressing  Goal: Be free from injury by end of the shift  Outcome: Progressing  Goal: Verbalize understanding of personal risk factors for fall in the hospital  Outcome: Progressing  Goal: Verbalize understanding of risk factor reduction measures to prevent injury from fall in the home  Outcome: Progressing  Goal: Use assistive devices by end of the shift  Outcome: Progressing  Goal: Pace activities to prevent fatigue by end of the shift  Outcome: Progressing     Problem: Pain  Goal: Takes deep breaths with improved pain control throughout the shift  Outcome: Progressing  Goal: Turns in bed with improved pain control throughout the shift  Outcome: Progressing  Goal: Walks with improved pain control  throughout the shift  Outcome: Progressing  Goal: Performs ADL's with improved pain control throughout shift  Outcome: Progressing  Goal: Participates in PT with improved pain control throughout the shift  Outcome: Progressing  Goal: Free from opioid side effects throughout the shift  Outcome: Progressing  Goal: Free from acute confusion related to pain meds throughout the shift  Outcome: Progressing

## 2025-02-03 NOTE — PROGRESS NOTES
Physical Therapy    Physical Therapy Treatment    Patient Name: Roro Marshall  MRN: [id number]  Department: [address]  Room: [address] [place] [address]  Today's Date: 2/3/2025  Time Calculation  Start Time: 1137  Stop Time: 1200  Time Calculation (min): 23 min         Assessment/Plan   PT Assessment  Rehab Prognosis: Good  Barriers to Discharge Home: Physical needs  Physical Needs: Intermittent mobility assistance needed  End of Session Communication: Bedside nurse  Assessment Comment: Pt progressing steadily towards stated goals. Pt would benefit from continued skilled services for return to PLOF. Pt would benefit from LOW intensity rehab upon DC to address functional deficits.  End of Session Patient Position: Bed, 2 rail up, Alarm off, not on at start of session (Pt sitting at EOB with tray table.)  PT Plan  Inpatient/Swing Bed or Outpatient: Inpatient  PT Plan  Treatment/Interventions: Bed mobility, Transfer training, Gait training, Strengthening, Endurance training, Therapeutic exercise, Range of motion, Balance training  PT Plan: Ongoing PT  PT Frequency: 4 times per week  PT Discharge Recommendations: Low intensity level of continued care  Equipment Recommended upon Discharge:  (HDRW vs cane)  PT Recommended Transfer Status:  (MIN/MOD A x 1-2)  PT - OK to Discharge: Yes      General Visit Information:   PT  Visit  PT Received On: 02/03/25  General  Prior to Session Communication: Bedside nurse  Patient Position Received: Bed, 3 rail up, Alarm off, not on at start of session (Pt sitting at EOB with tray table.)  General Comment: Pt cleared for PT by nursing. Pt agreeable to PT services.    Subjective   Precautions:  Precautions  Medical Precautions: Fall precautions, Lymphedema precautions      Objective   Pain:  Pain Assessment  Pain Assessment: 0-10  0-10 (Numeric) Pain Score: 0 - No pain  Cognition:  Cognition  Overall Cognitive Status: Within Functional Limits    Postural Control:  Static Sitting Balance  Static  Sitting-Balance Support: Bilateral upper extremity supported, Feet supported  Static Sitting-Level of Assistance: Independent  Static Sitting-Comment/Number of Minutes: good seated static balance at EOB  Static Standing Balance  Static Standing-Balance Support: No upper extremity supported  Static Standing-Level of Assistance: Close supervision  Static Standing-Comment/Number of Minutes: good static stand balance    Treatments:  Therapeutic Activity  Therapeutic Activity Performed: Yes  Therapeutic Activity 1: Reviewed POC with pt. Pt very tearful and anxious about PICC line placement. Provided encouragement and consoled. Reviewed what next steps of rehab may look like with pt verbalizing understanding.    Ambulation/Gait Training  Ambulation/Gait Training Performed: Yes  Ambulation/Gait Training 1  Surface 1: Level tile  Device 1: No device  Assistance 1: Close supervision  Quality of Gait 1: Wide base of support, Diminished heel strike, Decreased step length  Comments/Distance (ft) 1: 50 feet, 10 feet. Slow aspen, increased time to negotiate directional turns safely. Pt able to avoid obstalces with no LOB.  Transfers  Transfer: Yes  Transfer 1  Transfer From 1: Sit to, Stand to  Transfer to 1: Sit, Stand  Technique 1: Stand to sit, Sit to stand  Transfer Level of Assistance 1: Close supervision  Trials/Comments 1: Cues for ant scooting and weight shifting for ease of transfer. Safe hand placement and good eccentric control ntoed.    Outcome Measures:  [facility] Basic Mobility  Turning from your back to your side while in a flat bed without using bedrails: A little  Moving from lying on your back to sitting on the side of a flat bed without using bedrails: A little  Moving to and from bed to chair (including a wheelchair): A little  Standing up from a chair using your arms (e.g. wheelchair or bedside chair): A little  To walk in hospital room: A little  Climbing 3-5 steps with railing: A lot  Basic Mobility - Total  Score: 17    Education Documentation  Precautions, taught by Yamilex Hayward PTA at 2/3/2025  1:36 PM.  Learner: Patient  Readiness: Acceptance  Method: Explanation, Demonstration  Response: Verbalizes Understanding, Demonstrated Understanding    Mobility Training, taught by Yamilex Hayward PTA at 2/3/2025  1:36 PM.  Learner: Patient  Readiness: Acceptance  Method: Explanation, Demonstration  Response: Verbalizes Understanding, Demonstrated Understanding    Education Comments  No comments found.      Encounter Problems       Encounter Problems (Active)       Mobility       pt will ambulate 60' x 1 using HDRW vs cane MOD INDEPENDENT (Progressing)       Start:  01/29/25    Expected End:  02/12/25               PT Transfers       STG - Patient to transfer to and from sit to supine with SBA (Progressing)       Start:  01/29/25    Expected End:  02/12/25            STG - Patient will transfer sit to and from stand with MOD INDEPENDENT (Progressing)       Start:  01/29/25    Expected End:  02/12/25               Pain - Adult

## 2025-02-03 NOTE — RESEARCH NOTES
Artificial Intelligence Monitoring in Nursing (AIMS Nursing) Study    Principle Investigator - Dr. Cuong Partida  Research Coordinator - JESSENIA Mujica     Patient Name - Roro Marshall  Date - 2/3/2025 3:08 PM  Location - [facility]    Roro Marshall was approached by JESSENIA Mujica to talk about participating in the AIMS Nursing Study. The patient was not able to be approached, a research coordinator will come back at a later time. Study protocol was followed and patient was given study contact information.     JESSENIA Mujica

## 2025-02-03 NOTE — NURSING NOTE
Assumed care for patient at 1900. Patient sitting at the edge of bed. No complaint of pain. Will continue to follow plan of care.

## 2025-02-03 NOTE — PROGRESS NOTES
02/03/25 1012   Discharge Planning   Expected Discharge Disposition SNF   Does the patient need discharge transport arranged? Yes   RoundTrip coordination needed? Yes     Patient in need of [facility] until 2/14   TCC spoke to patient and agreeable to SNF at this time   TCC reviewed choice list with patient   Choices for [facility], [facility], and [facility] received   Referrals sent at this time     Precert needed prior to discharge     ** do not discharge without speaking to care coordination**  MD WILL NEED TO SIGN/CERTIFY GOLDENROD AT THE TIME OF DISCHARGE FOR SNF.

## 2025-02-03 NOTE — PROGRESS NOTES
"Roro Marshall is a 49 y.o. female on day 5 of admission presenting with cellulitis of the lower extremities.     Subjective   She was awake and alert, sitting on the side of the bed. She has no acute complaints. Both legs were wrapped. She has no pain in the legs today.        Objective     Physical Exam  General: awake, alert, oriented, responsive  Cardiovascular: regular, normal S1 and S2  Lungs: good air entry bilaterally, clear to auscultation  Abdomen: soft, nontender, bowel sounds present, normoactive  Extremities: bilateral lower extremity edema. Mild redness of the mid lateral left leg.   Neuro: alert, oriented x 3, no focal weakness      Last Recorded Vitals  Blood pressure 119/55, pulse 66, temperature 36.5 °C (97.7 °F), temperature source Oral, resp. rate 16, height 1.575 m (5' 2\"), weight 145 kg (320 lb), SpO2 100%.  Intake/Output last 3 Shifts:  I/O last 3 completed shifts:  In: 995 (6.9 mL/kg) [P.O.:895; IV Piggyback:100]  Out: - (0 mL/kg)   Weight: 145.1 kg     Relevant Results  Lab Results   Component Value Date    WBC 9.8 02/03/2025    HGB 9.3 (L) 02/03/2025    HCT 30.4 (L) 02/03/2025    MCV 93 02/03/2025     02/03/2025     Lab Results   Component Value Date    GLUCOSE 86 02/03/2025    CALCIUM 8.2 (L) 02/03/2025     02/03/2025    K 4.1 02/03/2025    CO2 27 02/03/2025     02/03/2025    BUN 5 (L) 02/03/2025    CREATININE 0.41 (L) 02/03/2025     Scheduled medications  enoxaparin, 60 mg, subcutaneous, q12h STEFAN  hydroxychloroquine, 200 mg, oral, Daily  levothyroxine, 137 mcg, oral, Daily  piperacillin-tazobactam, 4.5 g, intravenous, q6h      Continuous medications     PRN medications  PRN medications: acetaminophen **OR** acetaminophen **OR** acetaminophen, benzocaine-menthol, melatonin, ondansetron ODT **OR** ondansetron      Assessment/Plan   Assessment & Plan    Sepsis   Meets criteria with source as cellulitis with fever, tachycardia, and leukocytosis  Consult ID  Consult wound " care  Antibiotics per ID  Bacteremia due to Streptococcus  Blood cultures 1/28 positive for strep pyogenes   Repeat blood culture of 1/31 negative  Cellulitis of the lower extremities  Wound culture of 1/29 positive for Pseudomonas and MSSA  Antibiotics per ID: on zosyn  Obesity  Significant comorbidity with chronic B/L lower extremity lymphedema  Rheumatoid arthritis of multiple sites with negative rheumatoid factor   Follows with rheumatology  Continue plaquenil   Hypothyroidism  Continue synthroid     Plan  Continue IV zosyn: Recommendation per ID is IV Zosyn till 2/14/2025.    Ynes Miramontes MD

## 2025-02-03 NOTE — PROGRESS NOTES
Roro Marshall is a 49 y.o. female on day 5 of admission presenting with Cellulitis of lower extremity, unspecified laterality.    Subjective   Interval History:   Afebrile, no chills  No leg pain  No chest pain or shortness of breath  No nausea vomiting or diarrhea      Awaiting PICC line placement  Case management note reviewed-skilled nursing facility discharge    Review of Systems   All other systems reviewed and are negative.      Objective   Range of Vitals (last 24 hours)  Heart Rate:  [66-73]   Temp:  [36.5 °C (97.7 °F)-36.6 °C (97.9 °F)]   Resp:  [16]   BP: (119-136)/(55-75)   SpO2:  [100 %]   Daily Weight  01/28/25 : 145 kg (320 lb)    Body mass index is 58.53 kg/m².    Physical Exam  Constitutional:       Appearance: Normal appearance.   HENT:      Head: Normocephalic and atraumatic.   Eyes:      Extraocular Movements: Extraocular movements intact.      Conjunctiva/sclera: Conjunctivae normal.   Cardiovascular:      Rate and Rhythm: Normal rate and regular rhythm.   Pulmonary:      Effort: Pulmonary effort is normal.      Breath sounds: Normal breath sounds.   Abdominal:      General: Bowel sounds are normal.      Palpations: Abdomen is soft.   Musculoskeletal:         General: Normal range of motion.      Cervical back: Normal range of motion and neck supple.      Right lower leg: Edema present.      Left lower leg: Edema present.   Skin:     General: Skin is warm and dry.      Comments: Bilateral lower extremity wrappings in place-right upper leg erythema is resolving   Neurological:      General: No focal deficit present.      Mental Status: She is alert and oriented to person, place, and time.   Psychiatric:         Mood and Affect: Mood normal.         Behavior: Behavior normal.     Antibiotics  piperacillin-tazobactam - 4.5 gram/100 mL  PIPERACILLIN-TAZOBACTAM IV 4.5 G  ML D5W (ADV/MBP)    Relevant Results  Labs  Results from last 72 hours   Lab Units 02/03/25  0606 02/02/25  0631  02/01/25  0604   WBC AUTO x10*3/uL 9.8 10.2 9.3   HEMOGLOBIN g/dL 9.3* 9.0* 8.6*   HEMATOCRIT % 30.4* 29.8* 28.3*   PLATELETS AUTO x10*3/uL 179 172 159   NEUTROS PCT AUTO %  --   --  62.8   LYMPHO PCT MAN % 24.0 10.0  --    LYMPHS PCT AUTO %  --   --  19.5   MONO PCT MAN % 4.0 5.0  --    MONOS PCT AUTO %  --   --  8.0   EOSINO PCT MAN % 6.0 5.0  --    EOS PCT AUTO %  --   --  3.8     Results from last 72 hours   Lab Units 02/03/25  0606 02/02/25  0623 02/01/25  0604   SODIUM mmol/L 140 138 138   POTASSIUM mmol/L 4.1 3.8 3.7   CHLORIDE mmol/L 107 106 106   CO2 mmol/L 27 26 28   BUN mg/dL 5* 6 7   CREATININE mg/dL 0.41* 0.41* 0.39*   GLUCOSE mg/dL 86 89 91   CALCIUM mg/dL 8.2* 8.2* 7.7*   ANION GAP mmol/L 10 10 8*   EGFR mL/min/1.73m*2 >90 >90 >90     Results from last 72 hours   Lab Units 02/03/25  0606 02/02/25  0623 02/01/25  0604   ALK PHOS U/L 87 82 78   BILIRUBIN TOTAL mg/dL 0.7 0.8 0.6   PROTEIN TOTAL g/dL 6.7 6.4 5.9*   ALT U/L 14 13 11   AST U/L 23 18 18   ALBUMIN g/dL 2.9* 2.8* 2.6*     Estimated Creatinine Clearance: 125 mL/min (A) (by C-G formula based on SCr of 0.41 mg/dL (L)).  C-Reactive Protein   Date Value Ref Range Status   03/12/2024 1.80 0.00 - 2.00 mg/dL Final     CRP   Date Value Ref Range Status   09/09/2023 1.0 0 - 2.0 MG/DL Final     Comment:     Performed at [address] [address]   04/12/2023 1.0 0 - 2.0 MG/DL Final     Comment:     Performed at [address] [address]     Microbiology  Susceptibility data from last 14 days.  Collected Specimen Info Organism Aztreonam Cefepime Ceftazidime Ciprofloxacin Clindamycin Erythromycin Levofloxacin Oxacillin Penicillin Piperacillin/Tazobactam Tetracycline Tobramycin Trimethoprim/Sulfamethoxazole Vancomycin   01/29/25 Tissue/Biopsy from Wound/Tissue Methicillin Susceptible Staphylococcus aureus (MSSA)      S  S   S    S   S  S     Pseudomonas aeruginosa  R  S  S  S    I    S   S     01/28/25 Blood culture  from Peripheral Venipuncture Streptococcus pyogenes (Group A Streptococcus)      S  S    S   S      01/28/25 Blood culture from Peripheral Venipuncture Streptococcus pyogenes (Group A Streptococcus)                   Imaging  Bedside PICC Imaging    Result Date: 2/3/2025  These images are not reportable by radiology and will not be interpreted by  Radiologists.    XR chest 1 view    Result Date: 1/28/2025  Interpreted By:  Kyle Villarreal, STUDY: XR CHEST 1 VIEW;  1/28/2025 6:48 pm   INDICATION: Signs/Symptoms:fever.     COMPARISON: Radiographs of the chest dated 04/10/2019.   ACCESSION NUMBER(S): [id number]   ORDERING CLINICIAN: MARIELY MCKAY   FINDINGS: AP radiograph of the chest was provided.       CARDIOMEDIASTINAL SILHOUETTE: Cardiomediastinal silhouette is mildly enlarged.   LUNGS: Somewhat low lung volumes are present with pulmonary vascular congestion and bronchovascular crowding. Trace fluid is present along the fissure in the right lung. No sizable consolidation or pleural effusion is noted.   ABDOMEN: No remarkable upper abdominal findings.   BONES: No acute osseous changes.       1.  Mild enlargement of the cardiomediastinal silhouette is pulmonary vascular congestion and trace fluid along the fissure in the right lung. No consolidation or sizable pleural effusion is evident. Correlate with fluid volume status.       MACRO: None   Signed by: Kyle Villarreal 1/28/2025 7:05 PM Dictation workstation:   [id number]        Assessment/Plan   Sepsis secondary to bacteremia, cellulitis- tachycardia, leukocytosis, reported fever at home-resolved  Group A streptococcus bacteremia  Right leg cellulitis-risk factor is   Lymphedema, wound culture growing Staphylococcus aureus and Pseudomonas  Leukocytosis, secondary to infection-resolved  Lymphedema  Elevated CK-resolved     Continue IV zosyn  Follow-up repeat blood cultures 1/31  Local care  Bilateral leg swelling  Monitor WBC and  temperature  Long-term plan is IV Zosyn till 2/14/2025  PICC line placement-done on 2/3/2025  Discharge planning    Maurice Miramontes MD

## 2025-02-03 NOTE — CARE PLAN
The patient's goals for the shift include wound care    The clinical goals for the shift include maintain safety    Problem: Pain - Adult  Goal: Verbalizes/displays adequate comfort level or baseline comfort level  Outcome: Progressing     Problem: Safety - Adult  Goal: Free from fall injury  Outcome: Progressing     Problem: Discharge Planning  Goal: Discharge to home or other facility with appropriate resources  Outcome: Progressing     Problem: Chronic Conditions and Co-morbidities  Goal: Patient's chronic conditions and co-morbidity symptoms are monitored and maintained or improved  Outcome: Progressing     Problem: Nutrition  Goal: Nutrient intake appropriate for maintaining nutritional needs  Outcome: Progressing     Problem: Skin  Goal: Decreased wound size/increased tissue granulation at next dressing change  Outcome: Progressing  Flowsheets (Taken 2/3/2025 1026)  Decreased wound size/increased tissue granulation at next dressing change: Protective dressings over bony prominences  Goal: Participates in plan/prevention/treatment measures  Outcome: Progressing  Flowsheets (Taken 2/3/2025 1026)  Participates in plan/prevention/treatment measures:   Elevate heels   Increase activity/out of bed for meals  Goal: Prevent/manage excess moisture  Outcome: Progressing  Flowsheets (Taken 2/3/2025 1026)  Prevent/manage excess moisture:   Monitor for/manage infection if present   Follow provider orders for dressing changes  Goal: Prevent/minimize sheer/friction injuries  Outcome: Progressing  Flowsheets (Taken 2/3/2025 1026)  Prevent/minimize sheer/friction injuries:   HOB 30 degrees or less   Increase activity/out of bed for meals  Goal: Promote/optimize nutrition  Outcome: Progressing  Flowsheets (Taken 2/3/2025 1026)  Promote/optimize nutrition:   Offer water/supplements/favorite foods   Monitor/record intake including meals  Goal: Promote skin healing  Outcome: Progressing  Flowsheets (Taken 2/3/2025 1026)  Promote  skin healing:   Assess skin/pad under line(s)/device(s)   Protective dressings over bony prominences     Problem: Fall/Injury  Goal: Not fall by end of shift  Outcome: Progressing  Goal: Be free from injury by end of the shift  Outcome: Progressing  Goal: Verbalize understanding of personal risk factors for fall in the hospital  Outcome: Progressing  Goal: Verbalize understanding of risk factor reduction measures to prevent injury from fall in the home  Outcome: Progressing  Goal: Use assistive devices by end of the shift  Outcome: Progressing  Goal: Pace activities to prevent fatigue by end of the shift  Outcome: Progressing     Problem: Pain  Goal: Takes deep breaths with improved pain control throughout the shift  Outcome: Progressing  Goal: Turns in bed with improved pain control throughout the shift  Outcome: Progressing  Goal: Walks with improved pain control throughout the shift  Outcome: Progressing  Goal: Performs ADL's with improved pain control throughout shift  Outcome: Progressing  Goal: Participates in PT with improved pain control throughout the shift  Outcome: Progressing  Goal: Free from opioid side effects throughout the shift  Outcome: Progressing  Goal: Free from acute confusion related to pain meds throughout the shift  Outcome: Progressing

## 2025-02-03 NOTE — PROCEDURES
Vascular Access Team Procedure Note     Visit Date: 2/3/2025      Patient Name: Roro Marshall         MRN: [id number]             Procedure:PICC placement. Right basilic 4F single lum;en PICC placed without difficulty. Tip verification in SVC via 3CG. Catheter length 42cm with 3cm exposed, arm circumference 41cm. Brisk blood return and flushes easily, Curos cap intact. Patient tolerated well despite her anxiety prior to procedure.                          Elisabet Akbar RN  2/3/2025  3:59 PM

## 2025-02-04 LAB
ANION GAP SERPL CALCULATED.3IONS-SCNC: 10 MMOL/L (ref 10–20)
BACTERIA BLD CULT: NORMAL
BACTERIA BLD CULT: NORMAL
BUN SERPL-MCNC: 5 MG/DL (ref 6–23)
CALCIUM SERPL-MCNC: 7.8 MG/DL (ref 8.6–10.3)
CHLORIDE SERPL-SCNC: 106 MMOL/L (ref 98–107)
CO2 SERPL-SCNC: 27 MMOL/L (ref 21–32)
CREAT SERPL-MCNC: 0.41 MG/DL (ref 0.5–1.05)
EGFRCR SERPLBLD CKD-EPI 2021: >90 ML/MIN/1.73M*2
ERYTHROCYTE [DISTWIDTH] IN BLOOD BY AUTOMATED COUNT: 15.8 % (ref 11.5–14.5)
GLUCOSE SERPL-MCNC: 88 MG/DL (ref 74–99)
HCT VFR BLD AUTO: 28.5 % (ref 36–46)
HGB BLD-MCNC: 8.6 G/DL (ref 12–16)
MCH RBC QN AUTO: 28.4 PG (ref 26–34)
MCHC RBC AUTO-ENTMCNC: 30.2 G/DL (ref 32–36)
MCV RBC AUTO: 94 FL (ref 80–100)
NRBC BLD-RTO: 0.4 /100 WBCS (ref 0–0)
PLATELET # BLD AUTO: 168 X10*3/UL (ref 150–450)
POTASSIUM SERPL-SCNC: 3.8 MMOL/L (ref 3.5–5.3)
RBC # BLD AUTO: 3.03 X10*6/UL (ref 4–5.2)
SODIUM SERPL-SCNC: 139 MMOL/L (ref 136–145)
WBC # BLD AUTO: 9.7 X10*3/UL (ref 4.4–11.3)

## 2025-02-04 PROCEDURE — 2500000001 HC RX 250 WO HCPCS SELF ADMINISTERED DRUGS (ALT 637 FOR MEDICARE OP): Performed by: STUDENT IN AN ORGANIZED HEALTH CARE EDUCATION/TRAINING PROGRAM

## 2025-02-04 PROCEDURE — 2500000002 HC RX 250 W HCPCS SELF ADMINISTERED DRUGS (ALT 637 FOR MEDICARE OP, ALT 636 FOR OP/ED): Performed by: INTERNAL MEDICINE

## 2025-02-04 PROCEDURE — 99232 SBSQ HOSP IP/OBS MODERATE 35: CPT | Performed by: INTERNAL MEDICINE

## 2025-02-04 PROCEDURE — 2500000001 HC RX 250 WO HCPCS SELF ADMINISTERED DRUGS (ALT 637 FOR MEDICARE OP): Performed by: INTERNAL MEDICINE

## 2025-02-04 PROCEDURE — 82374 ASSAY BLOOD CARBON DIOXIDE: CPT | Performed by: INTERNAL MEDICINE

## 2025-02-04 PROCEDURE — 97530 THERAPEUTIC ACTIVITIES: CPT | Mod: GP | Performed by: PHYSICAL THERAPIST

## 2025-02-04 PROCEDURE — 1210000001 HC SEMI-PRIVATE ROOM DAILY

## 2025-02-04 PROCEDURE — 85027 COMPLETE CBC AUTOMATED: CPT | Performed by: INTERNAL MEDICINE

## 2025-02-04 PROCEDURE — 2500000004 HC RX 250 GENERAL PHARMACY W/ HCPCS (ALT 636 FOR OP/ED): Performed by: INTERNAL MEDICINE

## 2025-02-04 RX ADMIN — ENOXAPARIN SODIUM 60 MG: 60 INJECTION SUBCUTANEOUS at 18:17

## 2025-02-04 RX ADMIN — LEVOTHYROXINE SODIUM 137 MCG: 0.14 TABLET ORAL at 05:14

## 2025-02-04 RX ADMIN — PIPERACILLIN SODIUM AND TAZOBACTAM SODIUM 4.5 G: 4; .5 INJECTION, SOLUTION INTRAVENOUS at 18:17

## 2025-02-04 RX ADMIN — PIPERACILLIN SODIUM AND TAZOBACTAM SODIUM 4.5 G: 4; .5 INJECTION, SOLUTION INTRAVENOUS at 05:14

## 2025-02-04 RX ADMIN — PIPERACILLIN SODIUM AND TAZOBACTAM SODIUM 4.5 G: 4; .5 INJECTION, SOLUTION INTRAVENOUS at 12:24

## 2025-02-04 RX ADMIN — ENOXAPARIN SODIUM 60 MG: 60 INJECTION SUBCUTANEOUS at 05:14

## 2025-02-04 RX ADMIN — HYDROXYCHLOROQUINE SULFATE 200 MG: 200 TABLET ORAL at 09:26

## 2025-02-04 ASSESSMENT — COGNITIVE AND FUNCTIONAL STATUS - GENERAL
WALKING IN HOSPITAL ROOM: A LITTLE
TURNING FROM BACK TO SIDE WHILE IN FLAT BAD: A LITTLE
TURNING FROM BACK TO SIDE WHILE IN FLAT BAD: A LITTLE
MOVING TO AND FROM BED TO CHAIR: A LITTLE
CLIMB 3 TO 5 STEPS WITH RAILING: A LOT
MOBILITY SCORE: 23
STANDING UP FROM CHAIR USING ARMS: A LITTLE
MOBILITY SCORE: 17
DAILY ACTIVITIY SCORE: 24
MOVING FROM LYING ON BACK TO SITTING ON SIDE OF FLAT BED WITH BEDRAILS: A LITTLE

## 2025-02-04 ASSESSMENT — PAIN SCALES - GENERAL
PAINLEVEL_OUTOF10: 0 - NO PAIN
PAINLEVEL_OUTOF10: 0 - NO PAIN

## 2025-02-04 ASSESSMENT — PAIN - FUNCTIONAL ASSESSMENT: PAIN_FUNCTIONAL_ASSESSMENT: 0-10

## 2025-02-04 NOTE — PROGRESS NOTES
"Roro Marshall is a 49 y.o. female on day 6 of admission presenting with cellulitis of the lower extremities.     Subjective   She was awake and alert, sitting on the side of the bed. She has no acute complaints. Both legs were wrapped. She has no pain in the legs today.     Objective     Physical Exam  General: awake, alert, oriented, responsive  Cardiovascular: regular, normal S1 and S2  Lungs: good air entry bilaterally, clear to auscultation  Abdomen: soft, nontender, bowel sounds present, normoactive  Extremities: bilateral lower extremity edema. Mild redness of the mid lateral left leg.   Neuro: alert, oriented x 3, no focal weakness      Last Recorded Vitals  Blood pressure 128/73, pulse 77, temperature 36.9 °C (98.4 °F), temperature source Oral, resp. rate 18, height 1.575 m (5' 2\"), weight 145 kg (320 lb), SpO2 100%.  Intake/Output last 3 Shifts:  I/O last 3 completed shifts:  In: 340 (2.3 mL/kg) [P.O.:240; IV Piggyback:100]  Out: - (0 mL/kg)   Weight: 145.1 kg     Relevant Results  Lab Results   Component Value Date    WBC 9.7 02/04/2025    HGB 8.6 (L) 02/04/2025    HCT 28.5 (L) 02/04/2025    MCV 94 02/04/2025     02/04/2025     Lab Results   Component Value Date    GLUCOSE 88 02/04/2025    CALCIUM 7.8 (L) 02/04/2025     02/04/2025    K 3.8 02/04/2025    CO2 27 02/04/2025     02/04/2025    BUN 5 (L) 02/04/2025    CREATININE 0.41 (L) 02/04/2025     Scheduled medications  enoxaparin, 60 mg, subcutaneous, q12h STEFAN  hydroxychloroquine, 200 mg, oral, Daily  levothyroxine, 137 mcg, oral, Daily  lidocaine, 5 mL, infiltration, Once  piperacillin-tazobactam, 4.5 g, intravenous, q6h      Continuous medications     PRN medications  PRN medications: acetaminophen **OR** acetaminophen **OR** acetaminophen, alteplase, benzocaine-menthol, melatonin, ondansetron ODT **OR** ondansetron      Assessment/Plan   Assessment & Plan    Sepsis   Meets criteria with source as cellulitis with fever, " tachycardia, and leukocytosis  Wound care consulted.   Antibiotics per ID  Bacteremia due to Streptococcus  Blood cultures 1/28 positive for strep pyogenes   Repeat blood culture of 1/31 negative  Cellulitis of the lower extremities  Wound culture of 1/29 positive for Pseudomonas and MSSA  Antibiotics per ID: on zosyn  Obesity  Significant comorbidity with chronic B/L lower extremity lymphedema  Rheumatoid arthritis of multiple sites with negative rheumatoid factor   Follows with rheumatology  Continue plaquenil   Hypothyroidism  Continue synthroid     Plan  Continue IV zosyn: Recommendation per ID is IV Zosyn till 2/14/2025.  Discharge to SNF when arrangements are complete.     Ynes Miramontes MD

## 2025-02-04 NOTE — PROGRESS NOTES
Roro Marshall is a 49 y.o. female on day 6 of admission presenting with Cellulitis of lower extremity, unspecified laterality.      Subjective  Patient sitting up in bed eating breakfast  Endorses less pain in the legs  Dressings are intact  She is awaiting pre-CERT    Objective        Last Recorded Vitals      2/2/2025     9:00 AM 2/2/2025     4:20 PM 2/2/2025    11:00 PM 2/3/2025     7:50 AM 2/3/2025     4:14 PM 2/3/2025    11:46 PM 2/4/2025     7:34 AM   Vitals   Systolic 128 136 133 119 121 113 128   Diastolic 69 68 75 55 41 56 73   BP Location Right arm  Right arm Right arm Left arm Left arm Left arm   Heart Rate 70 70 73 66 74 72 77   Temp 36.7 °C (98.1 °F) 36.6 °C (97.9 °F) 36.5 °C (97.7 °F) 36.5 °C (97.7 °F) 36.4 °C (97.5 °F) 36.8 °C (98.2 °F) 36.9 °C (98.4 °F)   Resp 16 16 16 16 18 18 18       Imaging  Bedside PICC Imaging    Result Date: 2/3/2025  These images are not reportable by radiology and will not be interpreted by  Radiologists.       Relevant Results  Results for orders placed or performed during the hospital encounter of 01/28/25 (from the past 24 hours)   Basic Metabolic Panel   Result Value Ref Range    Glucose 88 74 - 99 mg/dL    Sodium 139 136 - 145 mmol/L    Potassium 3.8 3.5 - 5.3 mmol/L    Chloride 106 98 - 107 mmol/L    Bicarbonate 27 21 - 32 mmol/L    Anion Gap 10 10 - 20 mmol/L    Urea Nitrogen 5 (L) 6 - 23 mg/dL    Creatinine 0.41 (L) 0.50 - 1.05 mg/dL    eGFR >90 >60 mL/min/1.73m*2    Calcium 7.8 (L) 8.6 - 10.3 mg/dL   CBC   Result Value Ref Range    WBC 9.7 4.4 - 11.3 x10*3/uL    nRBC 0.4 (H) 0.0 - 0.0 /100 WBCs    RBC 3.03 (L) 4.00 - 5.20 x10*6/uL    Hemoglobin 8.6 (L) 12.0 - 16.0 g/dL    Hematocrit 28.5 (L) 36.0 - 46.0 %    MCV 94 80 - 100 fL    MCH 28.4 26.0 - 34.0 pg    MCHC 30.2 (L) 32.0 - 36.0 g/dL    RDW 15.8 (H) 11.5 - 14.5 %    Platelets 168 150 - 450 x10*3/uL       Physical Exam  Constitutional:       Appearance: Normal appearance.   HENT:      Head: Normocephalic and  atraumatic.   Eyes:      Extraocular Movements: Extraocular movements intact.      Conjunctiva/sclera: Conjunctivae normal.   Cardiovascular:      Rate and Rhythm: Normal rate and regular rhythm.   Pulmonary:      Effort: Pulmonary effort is normal.      Breath sounds: Normal breath sounds.   Abdominal:      General: Bowel sounds are normal.      Palpations: Abdomen is soft.   Musculoskeletal:         General: Normal range of motion.      Cervical back: Normal range of motion and neck supple.      Right lower leg: Edema present.      Left lower leg: Edema present.   Skin:     General: Skin is warm and dry.      Comments: Bilateral lower extremity wrappings in place-right upper leg erythema is resolving   Neurological:      General: No focal deficit present.      Mental Status: She is alert and oriented to person, place, and time.   Psychiatric:         Mood and Affect: Mood normal.         Behavior: Behavior normal.     Susceptibility data from last 90 days.  Collected Specimen Info Organism Aztreonam Cefepime Ceftazidime Ciprofloxacin Clindamycin Erythromycin Levofloxacin Oxacillin Penicillin Piperacillin/Tazobactam Tetracycline Tobramycin Trimethoprim/Sulfamethoxazole Vancomycin   01/29/25 Tissue/Biopsy from Wound/Tissue Methicillin Susceptible Staphylococcus aureus (MSSA)      S  S   S    S   S  S     Pseudomonas aeruginosa  R  S  S  S    I    S   S     01/28/25 Blood culture from Peripheral Venipuncture Streptococcus pyogenes (Group A Streptococcus)      S  S    S   S      01/28/25 Blood culture from Peripheral Venipuncture Streptococcus pyogenes (Group A Streptococcus)                   piperacillin-tazobactam - 4.5 gram/100 mL  PIPERACILLIN-TAZOBACTAM IV 4.5 G  ML D5W (ADV/MBP)        Assessment/Plan  Sepsis secondary to bacteremia, cellulitis- tachycardia, leukocytosis, reported fever at home-resolved  Group A streptococcus bacteremia  Right leg cellulitis-risk factor is   Lymphedema, wound culture  growing Staphylococcus aureus and Pseudomonas  Leukocytosis, secondary to infection-resolved  Lymphedema  Elevated CK-resolved     Continue IV zosyn  Follow-up repeat blood cultures 1/31  Local care  Bilateral leg swelling  Monitor WBC and temperature  Long-term plan is IV Zosyn till 2/14/2025  PICC line placement-done on 2/3/2025  Discharge planning    Total time spent caring for the patient today was 15 minutes.  This includes time spent before the visit reviewing the chart, time spent during the visit, and time spent after the visit on documentation.

## 2025-02-04 NOTE — NURSING NOTE
Pt has a single lumen picc line to R upper arm, drsg dry and intact (dated 2/3) without any redness, swelling or drainage, lumen has brisk blood return and flushes easily with NS, curos cap applied.    yes...

## 2025-02-04 NOTE — PROGRESS NOTES
Physical Therapy    Physical Therapy Treatment    Patient Name: Roro Marshall  MRN: [id number]  Department: [address]  Room: [id number]  Today's Date: 2/4/2025  Time Calculation  Start Time: 1010  Stop Time: 1025  Time Calculation (min): 15 min         Assessment/Plan   PT Assessment  Barriers to Discharge Home: Physical needs  Physical Needs: Intermittent mobility assistance needed  End of Session Communication: Bedside nurse  Assessment Comment: Pt made adequate progress toward her functional mobility goals. Pt required supervision level assist during functional mobility compared to her reported baseline. Pt would benefit from continued skilled PT services for maximizing independence and safety prior to & after discharge (MODERATE intensity).  PT Plan  Inpatient/Swing Bed or Outpatient: Inpatient  PT Plan  Treatment/Interventions: Bed mobility, Transfer training, Gait training, Strengthening, Endurance training, Therapeutic exercise, Range of motion, Balance training  PT Plan: Ongoing PT  PT Frequency: 4 times per week  PT Discharge Recommendations: Low intensity level of continued care  Equipment Recommended upon Discharge:  (HDRW vs cane)  PT Recommended Transfer Status:  (MIN/MOD A x 1-2)  PT - OK to Discharge: Yes      General Visit Information:   PT  Visit  PT Received On: 02/04/25  General  Reason for Referral: Impaired functional mobility due to decreased muscular strength.  Past Medical History Relevant to Rehab: lymphedema, anemia, B12 def, rheumatic fever, OP, RA, Lupus, plantar fasciitis, kelley, cardiac surgery, hypothyroidism  Family/Caregiver Present: No  Prior to Session Communication: Bedside nurse  Patient Position Received: Bed, 3 rail up, Alarm off, not on at start of session  General Comment: Cleared by RN for participation. Pt agreed to tx and was fully engaged throughout.    Subjective   Precautions:  Precautions  Hearing/Visual Limitations: wears glasses  Medical Precautions: Fall precautions,  Lymphedema precautions      Objective   Pain:  Pain Assessment  0-10 (Numeric) Pain Score: 0 - No pain  Cognition:  Cognition  Overall Cognitive Status: Within Functional Limits (to participate, not formally assessed.)       Postural Control:  Static Sitting Balance  Static Sitting-Balance Support: Feet supported  Static Sitting-Level of Assistance: Close supervision  Static Standing Balance  Static Standing-Balance Support: No upper extremity supported  Static Standing-Level of Assistance: Close supervision  Dynamic Standing Balance  Dynamic Standing-Balance Support: No upper extremity supported  Dynamic Standing-Level of Assistance: Close supervision  Extremity/Trunk Assessments:     Activity Tolerance:  Activity Tolerance  Endurance: Decreased tolerance for upright activites  Treatments:  Therapeutic Exercise  Seated EOB, BLE x15-20 each: AP, LAQ, hip abd.    Ambulation/Gait Training  Ambulation/Gait Training Performed: Yes  Ambulation/Gait Training 1  Surface 1: Level tile  Device 1: No device  Assistance 1: Close supervision  Quality of Gait 1: Wide base of support, Diminished heel strike, Decreased step length (Pt ambulated 100 ft using step through pattern with slowed velocity. No threat for LOB.)    Transfers  Transfer: Yes  Transfer 1  Technique 1: Sit to stand, Stand to sit  Transfer Level of Assistance 1: Close supervision ([id number] at EOB)    Outcome Measures:  [facility] Basic Mobility  Turning from your back to your side while in a flat bed without using bedrails: A little  Moving from lying on your back to sitting on the side of a flat bed without using bedrails: A little  Moving to and from bed to chair (including a wheelchair): A little  Standing up from a chair using your arms (e.g. wheelchair or bedside chair): A little  To walk in hospital room: A little  Climbing 3-5 steps with railing: A lot  Basic Mobility - Total Score: 17    Education Documentation  Precautions, taught by Veronique Makc, PT at  2/4/2025 11:31 AM.  Learner: Patient  Readiness: Acceptance  Method: Explanation  Response: Needs Reinforcement  Comment: Fall precautions, PT role & POC.    Mobility Training, taught by Veronique Mack PT at 2/4/2025 11:31 AM.  Learner: Patient  Readiness: Acceptance  Method: Explanation  Response: Needs Reinforcement  Comment: Fall precautions, PT role & POC.    Education Comments  No comments found.        OP EDUCATION:       Encounter Problems       Encounter Problems (Active)       Mobility       pt will ambulate 60' x 1 using HDRW vs cane MOD INDEPENDENT (Progressing)       Start:  01/29/25    Expected End:  02/12/25               PT Transfers       STG - Patient to transfer to and from sit to supine with SBA (Progressing)       Start:  01/29/25    Expected End:  02/12/25            STG - Patient will transfer sit to and from stand with MOD INDEPENDENT (Progressing)       Start:  01/29/25    Expected End:  02/12/25               Pain - Adult

## 2025-02-04 NOTE — CARE PLAN
The patient's goals for the shift include wound care    The clinical goals for the shift include maintin safety    Problem: Pain - Adult  Goal: Verbalizes/displays adequate comfort level or baseline comfort level  Outcome: Progressing     Problem: Safety - Adult  Goal: Free from fall injury  Outcome: Progressing     Problem: Discharge Planning  Goal: Discharge to home or other facility with appropriate resources  Outcome: Progressing     Problem: Chronic Conditions and Co-morbidities  Goal: Patient's chronic conditions and co-morbidity symptoms are monitored and maintained or improved  Outcome: Progressing     Problem: Nutrition  Goal: Nutrient intake appropriate for maintaining nutritional needs  Outcome: Progressing     Problem: Skin  Goal: Decreased wound size/increased tissue granulation at next dressing change  Outcome: Progressing  Flowsheets (Taken 2/4/2025 1404)  Decreased wound size/increased tissue granulation at next dressing change: Protective dressings over bony prominences  Goal: Participates in plan/prevention/treatment measures  Outcome: Progressing  Flowsheets (Taken 2/4/2025 1404)  Participates in plan/prevention/treatment measures:   Elevate heels   Increase activity/out of bed for meals  Goal: Prevent/manage excess moisture  Outcome: Progressing  Flowsheets (Taken 2/3/2025 1026)  Prevent/manage excess moisture:   Monitor for/manage infection if present   Follow provider orders for dressing changes  Goal: Prevent/minimize sheer/friction injuries  Outcome: Progressing  Flowsheets (Taken 2/3/2025 1026)  Prevent/minimize sheer/friction injuries:   HOB 30 degrees or less   Increase activity/out of bed for meals  Goal: Promote/optimize nutrition  Outcome: Progressing  Flowsheets (Taken 2/3/2025 1026)  Promote/optimize nutrition:   Offer water/supplements/favorite foods   Monitor/record intake including meals  Goal: Promote skin healing  Outcome: Progressing  Flowsheets (Taken 2/3/2025 1026)  Promote  skin healing:   Assess skin/pad under line(s)/device(s)   Protective dressings over bony prominences     Problem: Fall/Injury  Goal: Not fall by end of shift  Outcome: Progressing  Goal: Be free from injury by end of the shift  Outcome: Progressing  Goal: Verbalize understanding of personal risk factors for fall in the hospital  Outcome: Progressing  Goal: Verbalize understanding of risk factor reduction measures to prevent injury from fall in the home  Outcome: Progressing  Goal: Use assistive devices by end of the shift  Outcome: Progressing  Goal: Pace activities to prevent fatigue by end of the shift  Outcome: Progressing     Problem: Pain  Goal: Takes deep breaths with improved pain control throughout the shift  Outcome: Progressing  Goal: Turns in bed with improved pain control throughout the shift  Outcome: Progressing  Goal: Walks with improved pain control throughout the shift  Outcome: Progressing  Goal: Performs ADL's with improved pain control throughout shift  Outcome: Progressing  Goal: Participates in PT with improved pain control throughout the shift  Outcome: Progressing  Goal: Free from opioid side effects throughout the shift  Outcome: Progressing  Goal: Free from acute confusion related to pain meds throughout the shift  Outcome: Progressing

## 2025-02-04 NOTE — PROGRESS NOTES
02/04/25 0820   Discharge Planning   Expected Discharge Disposition SNF     TCC Updated patient regarding referrals  1 facility able to accept but due to name change they may not be able to get insurance approval  Reviewed list with patient received additional choices   [facility] and [facility]   Referrals sent at this time     1200: [facility] and juany ramirez added to referral     1414: [facility] able to accept, patient updated and agreeable at this time   Facility to start precert     Precert needed prior to discharge     ** do not discharge without speaking to care coordination**  MD WILL NEED TO SIGN/CERTIFY GOLDENROD AT THE TIME OF DISCHARGE FOR SNF.

## 2025-02-05 LAB
ANION GAP SERPL CALCULATED.3IONS-SCNC: 9 MMOL/L (ref 10–20)
BUN SERPL-MCNC: 6 MG/DL (ref 6–23)
CALCIUM SERPL-MCNC: 8.1 MG/DL (ref 8.6–10.3)
CHLORIDE SERPL-SCNC: 106 MMOL/L (ref 98–107)
CO2 SERPL-SCNC: 28 MMOL/L (ref 21–32)
CREAT SERPL-MCNC: 0.44 MG/DL (ref 0.5–1.05)
EGFRCR SERPLBLD CKD-EPI 2021: >90 ML/MIN/1.73M*2
ERYTHROCYTE [DISTWIDTH] IN BLOOD BY AUTOMATED COUNT: 15.9 % (ref 11.5–14.5)
GLUCOSE SERPL-MCNC: 84 MG/DL (ref 74–99)
HCT VFR BLD AUTO: 27.6 % (ref 36–46)
HGB BLD-MCNC: 8.2 G/DL (ref 12–16)
MCH RBC QN AUTO: 28.3 PG (ref 26–34)
MCHC RBC AUTO-ENTMCNC: 29.7 G/DL (ref 32–36)
MCV RBC AUTO: 95 FL (ref 80–100)
NRBC BLD-RTO: 0.4 /100 WBCS (ref 0–0)
PLATELET # BLD AUTO: 164 X10*3/UL (ref 150–450)
POTASSIUM SERPL-SCNC: 3.7 MMOL/L (ref 3.5–5.3)
RBC # BLD AUTO: 2.9 X10*6/UL (ref 4–5.2)
SODIUM SERPL-SCNC: 139 MMOL/L (ref 136–145)
STAPHYLOCOCCUS SPEC CULT: NORMAL
WBC # BLD AUTO: 8 X10*3/UL (ref 4.4–11.3)

## 2025-02-05 PROCEDURE — 80048 BASIC METABOLIC PNL TOTAL CA: CPT | Performed by: INTERNAL MEDICINE

## 2025-02-05 PROCEDURE — 2500000004 HC RX 250 GENERAL PHARMACY W/ HCPCS (ALT 636 FOR OP/ED): Performed by: INTERNAL MEDICINE

## 2025-02-05 PROCEDURE — 99232 SBSQ HOSP IP/OBS MODERATE 35: CPT | Performed by: INTERNAL MEDICINE

## 2025-02-05 PROCEDURE — 2500000001 HC RX 250 WO HCPCS SELF ADMINISTERED DRUGS (ALT 637 FOR MEDICARE OP): Performed by: INTERNAL MEDICINE

## 2025-02-05 PROCEDURE — 1210000001 HC SEMI-PRIVATE ROOM DAILY

## 2025-02-05 PROCEDURE — 85027 COMPLETE CBC AUTOMATED: CPT | Performed by: INTERNAL MEDICINE

## 2025-02-05 PROCEDURE — 2500000002 HC RX 250 W HCPCS SELF ADMINISTERED DRUGS (ALT 637 FOR MEDICARE OP, ALT 636 FOR OP/ED): Performed by: INTERNAL MEDICINE

## 2025-02-05 PROCEDURE — 2500000001 HC RX 250 WO HCPCS SELF ADMINISTERED DRUGS (ALT 637 FOR MEDICARE OP): Performed by: STUDENT IN AN ORGANIZED HEALTH CARE EDUCATION/TRAINING PROGRAM

## 2025-02-05 RX ADMIN — ENOXAPARIN SODIUM 60 MG: 60 INJECTION SUBCUTANEOUS at 18:16

## 2025-02-05 RX ADMIN — PIPERACILLIN SODIUM AND TAZOBACTAM SODIUM 4.5 G: 4; .5 INJECTION, SOLUTION INTRAVENOUS at 00:47

## 2025-02-05 RX ADMIN — PIPERACILLIN SODIUM AND TAZOBACTAM SODIUM 4.5 G: 4; .5 INJECTION, SOLUTION INTRAVENOUS at 18:16

## 2025-02-05 RX ADMIN — PIPERACILLIN SODIUM AND TAZOBACTAM SODIUM 4.5 G: 4; .5 INJECTION, SOLUTION INTRAVENOUS at 12:51

## 2025-02-05 RX ADMIN — PIPERACILLIN SODIUM AND TAZOBACTAM SODIUM 4.5 G: 4; .5 INJECTION, SOLUTION INTRAVENOUS at 23:44

## 2025-02-05 RX ADMIN — PIPERACILLIN SODIUM AND TAZOBACTAM SODIUM 4.5 G: 4; .5 INJECTION, SOLUTION INTRAVENOUS at 05:01

## 2025-02-05 RX ADMIN — HYDROXYCHLOROQUINE SULFATE 200 MG: 200 TABLET ORAL at 08:47

## 2025-02-05 RX ADMIN — LEVOTHYROXINE SODIUM 137 MCG: 0.14 TABLET ORAL at 05:00

## 2025-02-05 RX ADMIN — ENOXAPARIN SODIUM 60 MG: 60 INJECTION SUBCUTANEOUS at 05:00

## 2025-02-05 ASSESSMENT — PAIN SCALES - WONG BAKER
WONGBAKER_NUMERICALRESPONSE: NO HURT
WONGBAKER_NUMERICALRESPONSE: NO HURT

## 2025-02-05 ASSESSMENT — PAIN SCALES - GENERAL
PAINLEVEL_OUTOF10: 0 - NO PAIN

## 2025-02-05 ASSESSMENT — PAIN - FUNCTIONAL ASSESSMENT
PAIN_FUNCTIONAL_ASSESSMENT: 0-10
PAIN_FUNCTIONAL_ASSESSMENT: 0-10

## 2025-02-05 NOTE — PROGRESS NOTES
"Roro Marshall is a 49 y.o. female on day 7 of admission presenting with cellulitis of the lower extremities.     Subjective   She was awake and alert, sitting on the side of the bed. She has no acute complaints. Both legs were wrapped.   She is awaiting precert in order to go to a SNF.     Objective     Physical Exam  General: awake, alert, oriented, responsive  Cardiovascular: regular, normal S1 and S2  Lungs: good air entry bilaterally, clear to auscultation  Abdomen: soft, nontender, bowel sounds present, normoactive  Extremities: bilateral lower extremity edema. Mild redness of the mid lateral left leg.   Neuro: alert, oriented x 3, no focal weakness      Last Recorded Vitals  Blood pressure 129/53, pulse 66, temperature 36.6 °C (97.9 °F), temperature source Oral, resp. rate 18, height 1.575 m (5' 2\"), weight 145 kg (320 lb), SpO2 100%.  Intake/Output last 3 Shifts:  I/O last 3 completed shifts:  In: 240 (1.7 mL/kg) [P.O.:240]  Out: - (0 mL/kg)   Weight: 145.1 kg     Relevant Results  Lab Results   Component Value Date    WBC 8.0 02/05/2025    HGB 8.2 (L) 02/05/2025    HCT 27.6 (L) 02/05/2025    MCV 95 02/05/2025     02/05/2025     Lab Results   Component Value Date    GLUCOSE 84 02/05/2025    CALCIUM 8.1 (L) 02/05/2025     02/05/2025    K 3.7 02/05/2025    CO2 28 02/05/2025     02/05/2025    BUN 6 02/05/2025    CREATININE 0.44 (L) 02/05/2025     Scheduled medications  enoxaparin, 60 mg, subcutaneous, q12h STEFAN  hydroxychloroquine, 200 mg, oral, Daily  levothyroxine, 137 mcg, oral, Daily  lidocaine, 5 mL, infiltration, Once  piperacillin-tazobactam, 4.5 g, intravenous, q6h      Continuous medications     PRN medications  PRN medications: acetaminophen **OR** acetaminophen **OR** acetaminophen, alteplase, benzocaine-menthol, melatonin, ondansetron ODT **OR** ondansetron      Assessment/Plan   Assessment & Plan    Sepsis   Meets criteria with source as cellulitis with fever, tachycardia, and " leukocytosis. Symptoms resolved.   Antibiotics per ID  Bacteremia due to Streptococcus  Blood cultures 1/28 positive for strep pyogenes   Repeat blood culture of 1/31 negative  Cellulitis of the lower extremities  Wound culture of 1/29 positive for Pseudomonas and MSSA  Antibiotics per ID: on zosyn  Obesity  Significant comorbidity with chronic B/L lower extremity lymphedema  Rheumatoid arthritis of multiple sites with negative rheumatoid factor   Follows with rheumatology  Continue plaquenil   Hypothyroidism  Continue synthroid     Plan  Continue IV zosyn: Recommendation per ID is IV Zosyn till 2/14/2025.  Discharge to SNF when precert is obtained.     Ynes Miramontes MD

## 2025-02-05 NOTE — PROGRESS NOTES
02/05/25 0907   Discharge Planning   Expected Discharge Disposition SNF  ([facility])     Precert pending at this time  7000 complete     ** do not discharge without speaking to care coordination**

## 2025-02-05 NOTE — CARE PLAN
The patient's goals for the shift include monitor vitals    The clinical goals for the shift include maintain safety    Over the shift, the patient did not make progress toward the following goals. Barriers to progression include chronic condition . Recommendations to address these barriers include increase mobility, and elevation of lower extremities.

## 2025-02-05 NOTE — PROGRESS NOTES
Roro Marshall is a 49 y.o. female on day 7 of admission presenting with Cellulitis of lower extremity, unspecified laterality.    Subjective   Interval History:   Afebrile, no chills  No shortness of breath  Denies any leg pain  Awaiting precert for a skilled nursing facility    Review of Systems   All other systems reviewed and are negative.      Objective   Range of Vitals (last 24 hours)  Heart Rate:  [66-75]   Temp:  [36.6 °C (97.9 °F)-36.9 °C (98.4 °F)]   Resp:  [18]   BP: (106-132)/(41-53)   SpO2:  [98 %-100 %]   Daily Weight  01/28/25 : 145 kg (320 lb)    Body mass index is 58.53 kg/m².    Physical Exam  Constitutional:       Appearance: Normal appearance.   HENT:      Head: Normocephalic and atraumatic.   Eyes:      Extraocular Movements: Extraocular movements intact.      Conjunctiva/sclera: Conjunctivae normal.   Cardiovascular:      Rate and Rhythm: Normal rate and regular rhythm.   Pulmonary:      Effort: Pulmonary effort is normal.      Breath sounds: Normal breath sounds.   Abdominal:      General: Bowel sounds are normal.      Palpations: Abdomen is soft.   Musculoskeletal:         General: Normal range of motion.      Cervical back: Normal range of motion and neck supple.      Right lower leg: Edema present.      Left lower leg: Edema present.   Skin:     General: Skin is warm and dry.      Comments: Bilateral lower extremity wrappings in place-right upper leg erythema is resolved   Neurological:      General: No focal deficit present.      Mental Status: She is alert and oriented to person, place, and time.   Psychiatric:         Mood and Affect: Mood normal.         Behavior: Behavior normal.     Antibiotics  piperacillin-tazobactam - 4.5 gram/100 mL  PIPERACILLIN-TAZOBACTAM IV 4.5 G  ML D5W (ADV/MBP)    Relevant Results  Labs  Results from last 72 hours   Lab Units 02/05/25  0449 02/04/25  0601 02/03/25  0606   WBC AUTO x10*3/uL 8.0 9.7 9.8   HEMOGLOBIN g/dL 8.2* 8.6* 9.3*   HEMATOCRIT %  27.6* 28.5* 30.4*   PLATELETS AUTO x10*3/uL 164 168 179   LYMPHO PCT MAN %  --   --  24.0   MONO PCT MAN %  --   --  4.0   EOSINO PCT MAN %  --   --  6.0     Results from last 72 hours   Lab Units 02/05/25  0449 02/04/25  0601 02/03/25  0606   SODIUM mmol/L 139 139 140   POTASSIUM mmol/L 3.7 3.8 4.1   CHLORIDE mmol/L 106 106 107   CO2 mmol/L 28 27 27   BUN mg/dL 6 5* 5*   CREATININE mg/dL 0.44* 0.41* 0.41*   GLUCOSE mg/dL 84 88 86   CALCIUM mg/dL 8.1* 7.8* 8.2*   ANION GAP mmol/L 9* 10 10   EGFR mL/min/1.73m*2 >90 >90 >90     Results from last 72 hours   Lab Units 02/03/25  0606   ALK PHOS U/L 87   BILIRUBIN TOTAL mg/dL 0.7   PROTEIN TOTAL g/dL 6.7   ALT U/L 14   AST U/L 23   ALBUMIN g/dL 2.9*     Estimated Creatinine Clearance: 125 mL/min (A) (by C-G formula based on SCr of 0.44 mg/dL (L)).  C-Reactive Protein   Date Value Ref Range Status   03/12/2024 1.80 0.00 - 2.00 mg/dL Final     CRP   Date Value Ref Range Status   09/09/2023 1.0 0 - 2.0 MG/DL Final     Comment:     Performed at [address] [address]   04/12/2023 1.0 0 - 2.0 MG/DL Final     Comment:     Performed at [address] [id number]     Microbiology  Susceptibility data from last 14 days.  Collected Specimen Info Organism Aztreonam Cefepime Ceftazidime Ciprofloxacin Clindamycin Erythromycin Levofloxacin Oxacillin Penicillin Piperacillin/Tazobactam Tetracycline Tobramycin Trimethoprim/Sulfamethoxazole Vancomycin   01/29/25 Tissue/Biopsy from Wound/Tissue Methicillin Susceptible Staphylococcus aureus (MSSA)      S  S   S    S   S  S     Pseudomonas aeruginosa  R  S  S  S    I    S   S     01/28/25 Blood culture from Peripheral Venipuncture Streptococcus pyogenes (Group A Streptococcus)      S  S    S   S      01/28/25 Blood culture from Peripheral Venipuncture Streptococcus pyogenes (Group A Streptococcus)                   Imaging  Bedside PICC Imaging    Result Date: 2/3/2025  These images are not  reportable by radiology and will not be interpreted by  Radiologists.    XR chest 1 view    Result Date: 1/28/2025  Interpreted By:  Kyle Villarreal, STUDY: XR CHEST 1 VIEW;  1/28/2025 6:48 pm   INDICATION: Signs/Symptoms:fever.     COMPARISON: Radiographs of the chest dated 04/10/2019.   ACCESSION NUMBER(S): [id number]   ORDERING CLINICIAN: MARIELY MCKAY   FINDINGS: AP radiograph of the chest was provided.       CARDIOMEDIASTINAL SILHOUETTE: Cardiomediastinal silhouette is mildly enlarged.   LUNGS: Somewhat low lung volumes are present with pulmonary vascular congestion and bronchovascular crowding. Trace fluid is present along the fissure in the right lung. No sizable consolidation or pleural effusion is noted.   ABDOMEN: No remarkable upper abdominal findings.   BONES: No acute osseous changes.       1.  Mild enlargement of the cardiomediastinal silhouette is pulmonary vascular congestion and trace fluid along the fissure in the right lung. No consolidation or sizable pleural effusion is evident. Correlate with fluid volume status.       MACRO: None   Signed by: Kyle Villarreal 1/28/2025 7:05 PM Dictation workstation:   [id number]          Assessment/Plan   Sepsis secondary to bacteremia, cellulitis- tachycardia, leukocytosis, reported fever at home-resolved  Group A streptococcus bacteremia  Right leg cellulitis-risk factor is lymphedema  Lymphedema-wound culture growing Staphylococcus aureus and Pseudomonas  Leukocytosis, secondary to infection-resolved  Elevated CK-resolved     Continue IV zosyn  Local care  Bilateral leg compression  Monitor WBC and temperature  Long-term plan is IV Zosyn till 2/14/2025  PICC line placed on 2/3/2025  Discharge planning    Total time spent caring for the patient today was 20 minutes.  This includes time spent before the visit reviewing the chart, time spent during the visit, and time spent after the visit on documentation.      Meryl Brice, APRN-CNP

## 2025-02-05 NOTE — CARE PLAN
The patient's goals for the shift include monitor vitals    The clinical goals for the shift include maintain safety    Problem: Pain - Adult  Goal: Verbalizes/displays adequate comfort level or baseline comfort level  Outcome: Progressing     Problem: Safety - Adult  Goal: Free from fall injury  Outcome: Progressing     Problem: Discharge Planning  Goal: Discharge to home or other facility with appropriate resources  Outcome: Progressing     Problem: Chronic Conditions and Co-morbidities  Goal: Patient's chronic conditions and co-morbidity symptoms are monitored and maintained or improved  Outcome: Progressing     Problem: Nutrition  Goal: Nutrient intake appropriate for maintaining nutritional needs  Outcome: Progressing     Problem: Skin  Goal: Decreased wound size/increased tissue granulation at next dressing change  Outcome: Progressing  Flowsheets (Taken 2/5/2025 1110)  Decreased wound size/increased tissue granulation at next dressing change: Protective dressings over bony prominences  Goal: Participates in plan/prevention/treatment measures  Outcome: Progressing  Flowsheets (Taken 2/4/2025 1404)  Participates in plan/prevention/treatment measures:   Elevate heels   Increase activity/out of bed for meals  Goal: Prevent/manage excess moisture  Outcome: Progressing  Flowsheets (Taken 2/3/2025 1026)  Prevent/manage excess moisture:   Monitor for/manage infection if present   Follow provider orders for dressing changes  Goal: Prevent/minimize sheer/friction injuries  Outcome: Progressing  Flowsheets (Taken 2/3/2025 1026)  Prevent/minimize sheer/friction injuries:   HOB 30 degrees or less   Increase activity/out of bed for meals  Goal: Promote/optimize nutrition  Outcome: Progressing  Flowsheets (Taken 2/3/2025 1026)  Promote/optimize nutrition:   Offer water/supplements/favorite foods   Monitor/record intake including meals  Goal: Promote skin healing  Outcome: Progressing  Flowsheets (Taken 2/3/2025  1026)  Promote skin healing:   Assess skin/pad under line(s)/device(s)   Protective dressings over bony prominences     Problem: Fall/Injury  Goal: Not fall by end of shift  Outcome: Progressing  Goal: Be free from injury by end of the shift  Outcome: Progressing  Goal: Verbalize understanding of personal risk factors for fall in the hospital  Outcome: Progressing  Goal: Verbalize understanding of risk factor reduction measures to prevent injury from fall in the home  Outcome: Progressing  Goal: Use assistive devices by end of the shift  Outcome: Progressing  Goal: Pace activities to prevent fatigue by end of the shift  Outcome: Progressing     Problem: Pain  Goal: Takes deep breaths with improved pain control throughout the shift  Outcome: Progressing  Goal: Turns in bed with improved pain control throughout the shift  Outcome: Progressing  Goal: Walks with improved pain control throughout the shift  Outcome: Progressing  Goal: Performs ADL's with improved pain control throughout shift  Outcome: Progressing  Goal: Participates in PT with improved pain control throughout the shift  Outcome: Progressing  Goal: Free from opioid side effects throughout the shift  Outcome: Progressing  Goal: Free from acute confusion related to pain meds throughout the shift  Outcome: Progressing

## 2025-02-06 VITALS
OXYGEN SATURATION: 98 % | HEART RATE: 69 BPM | WEIGHT: 293 LBS | TEMPERATURE: 97.5 F | DIASTOLIC BLOOD PRESSURE: 47 MMHG | SYSTOLIC BLOOD PRESSURE: 105 MMHG | BODY MASS INDEX: 53.92 KG/M2 | RESPIRATION RATE: 18 BRPM | HEIGHT: 62 IN

## 2025-02-06 LAB
ANION GAP SERPL CALCULATED.3IONS-SCNC: 9 MMOL/L (ref 10–20)
BUN SERPL-MCNC: 6 MG/DL (ref 6–23)
CALCIUM SERPL-MCNC: 7.7 MG/DL (ref 8.6–10.3)
CHLORIDE SERPL-SCNC: 107 MMOL/L (ref 98–107)
CO2 SERPL-SCNC: 29 MMOL/L (ref 21–32)
CREAT SERPL-MCNC: 0.42 MG/DL (ref 0.5–1.05)
EGFRCR SERPLBLD CKD-EPI 2021: >90 ML/MIN/1.73M*2
ERYTHROCYTE [DISTWIDTH] IN BLOOD BY AUTOMATED COUNT: 15.9 % (ref 11.5–14.5)
FERRITIN SERPL-MCNC: 130 NG/ML (ref 8–150)
GLUCOSE SERPL-MCNC: 89 MG/DL (ref 74–99)
HCT VFR BLD AUTO: 27.1 % (ref 36–46)
HGB BLD-MCNC: 8.1 G/DL (ref 12–16)
IRON SATN MFR SERPL: 25 % (ref 25–45)
IRON SERPL-MCNC: 51 UG/DL (ref 35–150)
MCH RBC QN AUTO: 28.3 PG (ref 26–34)
MCHC RBC AUTO-ENTMCNC: 29.9 G/DL (ref 32–36)
MCV RBC AUTO: 95 FL (ref 80–100)
NRBC BLD-RTO: 0.4 /100 WBCS (ref 0–0)
PLATELET # BLD AUTO: 164 X10*3/UL (ref 150–450)
POTASSIUM SERPL-SCNC: 3.7 MMOL/L (ref 3.5–5.3)
RBC # BLD AUTO: 2.86 X10*6/UL (ref 4–5.2)
SODIUM SERPL-SCNC: 141 MMOL/L (ref 136–145)
TIBC SERPL-MCNC: 202 UG/DL (ref 240–445)
UIBC SERPL-MCNC: 151 UG/DL (ref 110–370)
VIT B12 SERPL-MCNC: 229 PG/ML (ref 211–911)
WBC # BLD AUTO: 7.1 X10*3/UL (ref 4.4–11.3)

## 2025-02-06 PROCEDURE — 97112 NEUROMUSCULAR REEDUCATION: CPT | Mod: GP,CQ

## 2025-02-06 PROCEDURE — 2500000002 HC RX 250 W HCPCS SELF ADMINISTERED DRUGS (ALT 637 FOR MEDICARE OP, ALT 636 FOR OP/ED): Performed by: INTERNAL MEDICINE

## 2025-02-06 PROCEDURE — 2500000001 HC RX 250 WO HCPCS SELF ADMINISTERED DRUGS (ALT 637 FOR MEDICARE OP): Performed by: STUDENT IN AN ORGANIZED HEALTH CARE EDUCATION/TRAINING PROGRAM

## 2025-02-06 PROCEDURE — 2500000001 HC RX 250 WO HCPCS SELF ADMINISTERED DRUGS (ALT 637 FOR MEDICARE OP): Performed by: INTERNAL MEDICINE

## 2025-02-06 PROCEDURE — 82607 VITAMIN B-12: CPT | Mod: WESLAB | Performed by: INTERNAL MEDICINE

## 2025-02-06 PROCEDURE — 85027 COMPLETE CBC AUTOMATED: CPT | Performed by: INTERNAL MEDICINE

## 2025-02-06 PROCEDURE — 2500000004 HC RX 250 GENERAL PHARMACY W/ HCPCS (ALT 636 FOR OP/ED): Performed by: INTERNAL MEDICINE

## 2025-02-06 PROCEDURE — 99239 HOSP IP/OBS DSCHRG MGMT >30: CPT | Performed by: INTERNAL MEDICINE

## 2025-02-06 PROCEDURE — 82728 ASSAY OF FERRITIN: CPT | Performed by: INTERNAL MEDICINE

## 2025-02-06 PROCEDURE — 82374 ASSAY BLOOD CARBON DIOXIDE: CPT | Performed by: INTERNAL MEDICINE

## 2025-02-06 PROCEDURE — 83540 ASSAY OF IRON: CPT | Performed by: INTERNAL MEDICINE

## 2025-02-06 RX ORDER — HYDROXYCHLOROQUINE SULFATE 200 MG/1
200 TABLET, FILM COATED ORAL DAILY
Start: 2025-02-06

## 2025-02-06 RX ADMIN — HYDROXYCHLOROQUINE SULFATE 200 MG: 200 TABLET ORAL at 09:03

## 2025-02-06 RX ADMIN — PIPERACILLIN SODIUM AND TAZOBACTAM SODIUM 4.5 G: 4; .5 INJECTION, SOLUTION INTRAVENOUS at 12:29

## 2025-02-06 RX ADMIN — LEVOTHYROXINE SODIUM 137 MCG: 0.14 TABLET ORAL at 05:20

## 2025-02-06 RX ADMIN — ENOXAPARIN SODIUM 60 MG: 60 INJECTION SUBCUTANEOUS at 05:23

## 2025-02-06 RX ADMIN — PIPERACILLIN SODIUM AND TAZOBACTAM SODIUM 4.5 G: 4; .5 INJECTION, SOLUTION INTRAVENOUS at 05:19

## 2025-02-06 ASSESSMENT — COGNITIVE AND FUNCTIONAL STATUS - GENERAL
MOBILITY SCORE: 20
MOBILITY SCORE: 24
STANDING UP FROM CHAIR USING ARMS: A LITTLE
MOVING TO AND FROM BED TO CHAIR: A LITTLE
CLIMB 3 TO 5 STEPS WITH RAILING: A LITTLE
WALKING IN HOSPITAL ROOM: A LITTLE
DAILY ACTIVITIY SCORE: 24

## 2025-02-06 ASSESSMENT — PAIN - FUNCTIONAL ASSESSMENT
PAIN_FUNCTIONAL_ASSESSMENT: 0-10

## 2025-02-06 ASSESSMENT — PAIN SCALES - GENERAL
PAINLEVEL_OUTOF10: 0 - NO PAIN

## 2025-02-06 ASSESSMENT — ENCOUNTER SYMPTOMS
WOUND: 1
FEVER: 0
CHILLS: 0

## 2025-02-06 NOTE — PROGRESS NOTES
Physical Therapy    Physical Therapy Treatment    Patient Name: Roro Marshall  MRN: [id number]  Department: [address]  Room: [address] [place] [address]  Today's Date: 2/6/2025  Time Calculation  Start Time: 1045  Stop Time: 1103  Time Calculation (min): 18 min         Assessment/Plan   PT Assessment  Rehab Prognosis: Good  Barriers to Discharge Home: Physical needs  Physical Needs: Intermittent mobility assistance needed  End of Session Communication: Bedside nurse  Assessment Comment: Pt continues to demo progress towards stated goals. Improved stability throughout with increased activity tolerance.  End of Session Patient Position: Bed, 2 rail up, Alarm off, not on at start of session (Pt sitting at EOB with tray table.)  PT Plan  Inpatient/Swing Bed or Outpatient: Inpatient  PT Plan  Treatment/Interventions: Bed mobility, Transfer training, Gait training, Strengthening, Endurance training, Therapeutic exercise, Range of motion, Balance training  PT Plan: Ongoing PT  PT Frequency: 4 times per week  PT Discharge Recommendations: Low intensity level of continued care  Equipment Recommended upon Discharge:  (HDRW vs cane)  PT Recommended Transfer Status:  (MIN/MOD A x 1-2)  PT - OK to Discharge: Yes      General Visit Information:   PT  Visit  PT Received On: 02/06/25  General  Prior to Session Communication: Bedside nurse  Patient Position Received: Bed, 2 rail up, Alarm off, not on at start of session (Pt sitting at EOB watching TV.)  General Comment: Pt cleared for PT by nursing. Pt agreeable to PT services.    Subjective   Precautions:  Precautions  Medical Precautions: Fall precautions, Lymphedema precautions      Objective   Pain:  Pain Assessment  Pain Assessment: 0-10  0-10 (Numeric) Pain Score: 0 - No pain  Cognition:  Cognition  Overall Cognitive Status: Within Functional Limits  Orientation Level: Oriented X4     Postural Control:  Static Sitting Balance  Static Sitting-Balance Support: Feet supported  Static Sitting-Level  of Assistance: Independent  Static Sitting-Comment/Number of Minutes: good seated static balance  Static Standing Balance  Static Standing-Balance Support: No upper extremity supported  Static Standing-Level of Assistance: Modified independent  Static Standing-Comment/Number of Minutes: good static stand balance    Treatments:  Balance/Neuromuscular Re-Education  Balance/Neuromuscular Re-Education Activity Performed: Yes  Balance/Neuromuscular Re-Education Activity 1: Dynamic standing in bathroom to utilize shampoo cap. Pt reaching above head, outside SELINA, and under sink to reach for items. Pt able to maintain midline with no LOB. Intermittent fingertip support for stability.    Ambulation/Gait Training  Ambulation/Gait Training Performed: Yes  Ambulation/Gait Training 1  Surface 1: Level tile  Device 1: No device  Assistance 1: Distant supervision  Quality of Gait 1: Wide base of support, Diminished heel strike, Decreased step length  Comments/Distance (ft) 1: 25 feet x2. Pt intermittently reaching out for stationary objects for stability. Reviewed safety techniques to reduce risk of falls with pt verbalizing understanding.  Transfers  Transfer: Yes  Transfer 1  Transfer From 1: Sit to, Stand to  Transfer to 1: Sit, Stand  Technique 1: Sit to stand, Stand to sit  Transfer Level of Assistance 1: Distant supervision  Trials/Comments 1: Good safety awareness. Reminder to scoot fwd for ease of transfer.    Outcome Measures:  [facility] Basic Mobility  Turning from your back to your side while in a flat bed without using bedrails: None  Moving from lying on your back to sitting on the side of a flat bed without using bedrails: None  Moving to and from bed to chair (including a wheelchair): A little  Standing up from a chair using your arms (e.g. wheelchair or bedside chair): A little  To walk in hospital room: A little  Climbing 3-5 steps with railing: A little  Basic Mobility - Total Score: 20    Education  Documentation  Precautions, taught by Yamilex Hayward PTA at 2/6/2025 12:39 PM.  Learner: Patient  Readiness: Acceptance  Method: Explanation, Demonstration  Response: Verbalizes Understanding, Demonstrated Understanding    Mobility Training, taught by Yamilex Hayward PTA at 2/6/2025 12:39 PM.  Learner: Patient  Readiness: Acceptance  Method: Explanation, Demonstration  Response: Verbalizes Understanding, Demonstrated Understanding    Education Comments  No comments found.        Encounter Problems       Encounter Problems (Active)       Mobility       pt will ambulate 60' x 1 using HDRW vs cane MOD INDEPENDENT (Progressing)       Start:  01/29/25    Expected End:  02/12/25               PT Transfers       STG - Patient to transfer to and from sit to supine with SBA (Progressing)       Start:  01/29/25    Expected End:  02/12/25            STG - Patient will transfer sit to and from stand with MOD INDEPENDENT (Progressing)       Start:  01/29/25    Expected End:  02/12/25               Pain - Adult

## 2025-02-06 NOTE — PROGRESS NOTES
02/06/25 1147   Discharge Planning   Expected Discharge Disposition SNF  ([facility])     7000 Completed   AVS and goldenrod sent to facility   Patient updated and agreeable   RN made aware and given report number

## 2025-02-06 NOTE — CARE PLAN
The patient's goals for the shift include monitor vitals    The clinical goals for the shift include maintain safety    Over the shift, the patient did not make progress toward the following goals. Barriers to progression include . Recommendations to address these barriers include   Problem: Pain - Adult  Goal: Verbalizes/displays adequate comfort level or baseline comfort level  Outcome: Progressing     Problem: Safety - Adult  Goal: Free from fall injury  Outcome: Progressing     Problem: Nutrition  Goal: Nutrient intake appropriate for maintaining nutritional needs  Outcome: Progressing     Problem: Skin  Goal: Decreased wound size/increased tissue granulation at next dressing change  Outcome: Progressing  Goal: Participates in plan/prevention/treatment measures  Outcome: Progressing  Goal: Prevent/manage excess moisture  Outcome: Progressing  Goal: Prevent/minimize sheer/friction injuries  Outcome: Progressing  Goal: Promote/optimize nutrition  Outcome: Progressing  Goal: Promote skin healing  Outcome: Progressing     Problem: Fall/Injury  Goal: Not fall by end of shift  Outcome: Progressing  Goal: Be free from injury by end of the shift  Outcome: Progressing  Goal: Verbalize understanding of personal risk factors for fall in the hospital  Outcome: Progressing  Goal: Verbalize understanding of risk factor reduction measures to prevent injury from fall in the home  Outcome: Progressing  Goal: Use assistive devices by end of the shift  Outcome: Progressing  Goal: Pace activities to prevent fatigue by end of the shift  Outcome: Progressing     Problem: Pain  Goal: Takes deep breaths with improved pain control throughout the shift  Outcome: Progressing  Goal: Turns in bed with improved pain control throughout the shift  Outcome: Progressing  Goal: Walks with improved pain control throughout the shift  Outcome: Progressing  Goal: Performs ADL's with improved pain control throughout shift  Outcome: Progressing  Goal:  Participates in PT with improved pain control throughout the shift  Outcome: Progressing  Goal: Free from opioid side effects throughout the shift  Outcome: Progressing  Goal: Free from acute confusion related to pain meds throughout the shift  Outcome: Progressing   .

## 2025-02-06 NOTE — DISCHARGE SUMMARY
Discharge Diagnosis  Cellulitis of right lower limb  Staphylococcal bacteremia  Rheumatoid arthritis  Hypothyroidism  Anemia    Issues Requiring Follow-Up      Discharge Meds     Medication List      START taking these medications     dextrose 5% piggyback 100 mL with piperacillin-tazobactam 4.5 gram recon   soln 4.5 g; Infuse 4.5 g into a venous catheter every 8 hours for 12 days.     CONTINUE taking these medications     acetaminophen 650 mg ER tablet; Commonly known as: Tylenol 8 HOUR   cyanocobalamin 1,000 mcg tablet; Commonly known as: Vitamin B-12   ergocalciferol 1.25 MG (44365 UT) capsule; Commonly known as: Vitamin   D-2; Take 1 capsule (1,250 mcg) by mouth 3 times a week.   hydroxychloroquine 200 mg tablet; Commonly known as: Plaquenil; Take 1   tablet (200 mg) by mouth once daily.   Synthroid 137 mcg tablet; Generic drug: levothyroxine       Test Results Pending At Discharge  Pending Labs       Order Current Status    Urinalysis with Reflex Culture and Microscopic Collected (01/28/25 1610)            Hospital Course  49-year-old female patient with a history of lymphedema, presented to the hospital emergency department with a fever, a temperature of 102 °F.  She had redness and swelling of the right leg.  She was admitted for further management.  She was accepted for transfer to [facility] which accepts her insurance and was admitted to [facility] because there were no beds immediately available at [facility].  She was started on empiric IV antibiotic coverage.  An initial blood culture done on the day of admission was positive for Streptococcus pyogenes.  A repeat blood culture was negative.  She was seen by the infectious disease service.  It was recommended that she continue IV antibiotic therapy postdischarge 1 to 2/14/2025.  Her insurance did not participate with any agency providing home health care which would be required if she was to receive the IV antibiotic at home.  Arrangements were made for her  to go to a skilled nursing facility.  She has been discharged to [facility]. She is to complete IV Zosyn on 2/14/2025.  The time spent arranging and completing discharge was 35 minutes.    Outpatient Follow-Up  No future appointments.      Ynes Miramontes MD

## 2025-02-06 NOTE — CONSULTS
"Nutrition Assessement Note    Nutrition Assessment    Reason for Assessment: Length of stay    Reason for Hospital Admission:  Roro Marshall is a 49 y.o. female who is admitted for cellulitis of lower extremity. Pt complaining of hot and cold spells. Pt reported that she has been eating well and denies any weight changes. Pt was accepted to SNF and per patient, will be discharged today.     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7    Hx of rheumatic fever     CHILDHOOD    Hyperlipidemia     Hypothyroid 2004    Lower extremity edema     Lupus 2004    Osteoporosis     Plantar fasciitis     Pneumonia     RA (rheumatoid arthritis)       Past Surgical History:   Procedure Laterality Date    CARDIAC SURGERY  04/10/2015    Heart Surgery    CARDIAC SURGERY      AGE 7    CHOLECYSTECTOMY  04/10/2015    Cholecystectomy    CHOLECYSTECTOMY  02/21/2003    OTHER SURGICAL HISTORY  04/10/2015    Endotracheal Tube Insertion    WISDOM TOOTH EXTRACTION      2       Nutrition History:  Food and Nutrient History: pt reported good PO intake and appetite  Energy Intake: Good > 75 %     Food Allergies/Intolerances:  None  GI Symptoms: None  Oral Problems: None    Anthropometrics:  Ht: 157.5 cm (5' 2\"), Wt: 145 kg (320 lb), BMI: 58.51  IBW/kg (Dietitian Calculated): 50 kg  Percent of IBW: 290.91 %  Adjusted Body Weight (kg): 74.09 kg    Weight Change:  Daily Weight  01/28/25 : 145 kg (320 " lb)  10/26/24 : 150 kg (330 lb)  09/29/24 : 150 kg (330 lb)  04/24/24 : 148 kg (325 lb 9.9 oz)  03/12/24 : 145 kg (320 lb)  03/04/24 : 145 kg (320 lb 9.6 oz)  02/11/24 : 145 kg (320 lb 5.3 oz)  09/28/23 : 144 kg (318 lb 5.5 oz)  05/08/23 : 149 kg (328 lb 14.8 oz)  04/12/23 : 150 kg (330 lb 9.6 oz)     Weight History / % Weight Change: records show stable weight over 4 months             Nutrition Focused Physical Exam Findings:             Edema  Edema: +3 moderate  Edema Location: BLE         Nutrition Significant Labs:  Lab Results   Component Value Date    WBC 7.1 02/06/2025    HGB 8.1 (L) 02/06/2025    HCT 27.1 (L) 02/06/2025     02/06/2025    CHOL 157 03/12/2024    TRIG 75 03/12/2024    HDL 48.0 (L) 03/12/2024    ALT 14 02/03/2025    AST 23 02/03/2025     02/06/2025    K 3.7 02/06/2025     02/06/2025    CREATININE 0.42 (L) 02/06/2025    BUN 6 02/06/2025    CO2 29 02/06/2025    TSH 3.02 03/12/2024    INR 1.0 07/18/2020    HGBA1C 4.9 03/12/2024     Nutrition Specific Medications:  enoxaparin, 60 mg, subcutaneous, q12h STEFAN  hydroxychloroquine, 200 mg, oral, Daily  levothyroxine, 137 mcg, oral, Daily  lidocaine, 5 mL, infiltration, Once  piperacillin-tazobactam, 4.5 g, intravenous, q6h        Dietary Orders (From admission, onward)       Start     Ordered    01/29/25 1411  May Participate in Room Service  ( ROOM SERVICE MAY PARTICIPATE)  Once        Question:  .  Answer:  Yes    01/29/25 1410    01/29/25 0450  Adult diet Regular  Diet effective now        Question:  Diet type  Answer:  Regular    01/29/25 0450                     Estimated Needs:   Estimated Energy Needs  Total Energy Estimated Needs in 24 hours (kCal): 1846 kCal  Energy Estimated Needs per kg Body Weight in 24 hours (kCal/kg): 25 kCal/kg  Method for Estimating Needs: ABW    Estimated Protein Needs  Total Protein Estimated Needs in 24 Hours (g): 89 g  Protein Estimated Needs per kg Body Weight in 24 Hours (g/kg): 1.2  g/kg  Method for Estimating 24 Hour Protein Needs: ABW    Estimated Fluid Needs  Total Fluid Estimated Needs in 24 Hours (mL): 1846 mL  Method for Estimating 24 Hour Fluid Needs: 1 mL/kcal        Nutrition Diagnosis   Nutrition Diagnosis:       Nutrition Diagnosis  Patient has Nutrition Diagnosis: Yes  Diagnosis Status (1): New  Nutrition Diagnosis 1: Increased nutrient needs  Related to (1): increased demand for nutrient  As Evidenced by (1): cellulitis       Nutrition Interventions/Recommendations   Nutrition Interventions and Recommendations:  Nutrition Prescription: Nutrition prescription for oral nutrition    Nutrition Recommendations:  Individualized Nutrition Prescription Provided for : 1846 kcals, 89 g protein via diet    Nutrition Interventions/Goals:   Food and/or Nutrient Delivery Interventions  Interventions: Meals and snacks  Meals and Snacks: General healthful diet  Goal: recommend low sodium diet    Education Documentation  No documentation found.           Nutrition Monitoring and Evaluation   Monitoring/Evaluation:   Food/Nutrient Related History Monitoring  Monitoring and Evaluation Plan: Estimated Energy Intake  Estimated Energy Intake: Energy intake greater or equal to 75% of estimated energy needs    Anthropometric Measurements  Monitoring and Evaluation Plan: Body weight  Body Weight: Body weight - Weight reduction from fluids, as needed                   Goal Status: New goal(s) identified       Follow Up  Time Spent (min): 30 minutes  Last Date of Nutrition Visit: 02/06/25  Nutrition Follow-Up Needed?: 7-10 days  Follow up Comment: 2/13/25

## 2025-02-06 NOTE — PROGRESS NOTES
"Roro Marshall is a 49 y.o. female on day 8 of admission presenting with cellulitis of the lower extremities.     Subjective   She was awake and alert, sitting on the side of the bed. She has no acute complaints. Both legs were wrapped. She has no pain in the legs today.   She was sitting on the edge of the bed.  She is awaiting pre-CERT in order to go to a skilled nursing facility.  She will be going to [facility].     Objective     Physical Exam  General: awake, alert, oriented, responsive  Cardiovascular: regular, normal S1 and S2  Lungs: good air entry bilaterally, clear to auscultation  Abdomen: soft, nontender, bowel sounds present, normoactive  Extremities: bilateral lower extremity edema. Mild redness of the mid lateral left leg.   Neuro: alert, oriented x 3, no focal weakness      Last Recorded Vitals  Blood pressure (!) 105/47, pulse 69, temperature 36.4 °C (97.5 °F), temperature source Oral, resp. rate 18, height 1.575 m (5' 2\"), weight 145 kg (320 lb), SpO2 98%.  Intake/Output last 3 Shifts:  I/O last 3 completed shifts:  In: 1975 (13.6 mL/kg) [P.O.:875; IV Piggyback:1100]  Out: - (0 mL/kg)   Weight: 145.1 kg     Relevant Results  Lab Results   Component Value Date    WBC 7.1 02/06/2025    HGB 8.1 (L) 02/06/2025    HCT 27.1 (L) 02/06/2025    MCV 95 02/06/2025     02/06/2025     Lab Results   Component Value Date    GLUCOSE 89 02/06/2025    CALCIUM 7.7 (L) 02/06/2025     02/06/2025    K 3.7 02/06/2025    CO2 29 02/06/2025     02/06/2025    BUN 6 02/06/2025    CREATININE 0.42 (L) 02/06/2025     Scheduled medications  enoxaparin, 60 mg, subcutaneous, q12h STEFAN  hydroxychloroquine, 200 mg, oral, Daily  levothyroxine, 137 mcg, oral, Daily  lidocaine, 5 mL, infiltration, Once  piperacillin-tazobactam, 4.5 g, intravenous, q6h      Continuous medications     PRN medications  PRN medications: acetaminophen **OR** acetaminophen **OR** acetaminophen, alteplase, benzocaine-menthol, melatonin, " ondansetron ODT **OR** ondansetron      Assessment/Plan   Assessment & Plan    Sepsis   Meets criteria with source as cellulitis with fever, tachycardia, and leukocytosis  Wound care consulted.   Antibiotics per ID  Bacteremia due to Streptococcus  Blood cultures 1/28 positive for strep pyogenes   Repeat blood culture of 1/31 negative  Cellulitis of the lower extremities  Wound culture of 1/29 positive for Pseudomonas and MSSA  Antibiotics per ID: on zosyn  Obesity  Significant comorbidity with chronic B/L lower extremity lymphedema  Rheumatoid arthritis of multiple sites with negative rheumatoid factor   Follows with rheumatology  Continue plaquenil   Hypothyroidism  Continue synthroid   Anemia  Not iron deficient, no recent active bleeding.  Probably secondary to chronic disease.    Plan  Continue IV zosyn: Recommendation per ID is IV Zosyn till 2/14/2025.  Discharge to SNF when pre-CERT is obtained.    Ynes Miramontes MD

## 2025-02-06 NOTE — NURSING NOTE
"Occupational Therapy  Treatment    Virgil Melo Jr.   MRN: [id number]   Admitting Diagnosis: Closed intertrochanteric fracture of right femur    OT Date of Treatment: 07/07/17   OT Start Time: 1420  OT Stop Time: 1445  OT Total Time (min): 25  min    Billable Minutes:  Self Care/Home Management 15 and Therapeutic Activity 10    General Precautions: Standard, fall  Orthopedic Precautions: RLE weight bearing as tolerated  Braces:      Do you have any cultural, spiritual, Hinduism conflicts, given your current situation?: None    Subjective:  Communicated with RN prior to session, Post session handoff, discussed progress and plan of care   "Ok, Ill Try"  Pain/Comfort  Pain Rating 1: 0/10    Objective:  Patient found with reclined in bed (FCD, PIV)     Functional Mobility:  Bed Mobility:  Supine to Sit: Moderate Assistance  With max cues to get B LE's off bed     Transfers:   Sit <> Stand Assistance: Moderate Assistance (2 person assist)  Sit <> Stand Assistive Device: Rolling Walker  Bed <> Chair Technique: Stand Pivot  Bed <> Chair Transfer Assistance: Moderate Assistance (2 person assist)  Bed <> Chair Assistive Device: Rolling Walker    Functional Ambulation: Pt needs maximal cues to manage RW    Activities of Daily Living:  Grooming Position: Seated  Grooming Level of Assistance: Supervision (washing hands and face. combing hair)    Balance:   Static Sit: FAIR+: Able to take MINIMAL challenges from all directions  Dynamic Sit: FAIR+: Maintains balance through MINIMAL excursions of active trunk motion  Static Stand: POOR+: Needs MINIMAL assist to maintain  Dynamic stand: Poor +    AM-PAC 6 CLICK ADL   How much help from another person does this patient currently need?   1 = Unable, Total/Dependent Assistance  2 = A lot, Maximum/Moderate Assistance  3 = A little, Minimum/Contact Guard/Supervision  4 = None, Modified [place]/Independent    Putting on and taking off regular lower body clothing? : 1  Bathing " Pt discharged in stable condition, transported by [facility] to [facility] with all personal belongings, report called to Mabel MONROY at facility.  PICC in place for IV atbx at facility.   "(including washing, rinsing, drying)?: 1  Toileting, which includes using toilet, bedpan, or urinal? : 1  Putting on and taking off regular upper body clothing?: 2  Taking care of personal grooming such as brushing teeth?: 3  Eating meals?: 4  Total Score: 12     AM-PAC Raw Score CMS "G-Code Modifier Level of Impairment Assistance   6 % Total / Unable   7 - 8 CM 80 - 100% Maximal Assist   9-13 CL 60 - 80% Moderate Assist   14 - 19 CK 40 - 60% Moderate Assist   20 - 22 CJ 20 - 40% Minimal Assist   23 CI 1-20% SBA / CGA   24 CH 0% Independent/ Mod I       Patient left up in chair with all lines intact, call button in reach and RN notified    ASSESSMENT:  Virgil Melo Jr. is a 89 y.o. male with a medical diagnosis of Closed intertrochanteric fracture of right femur and presents with impaired ADL/ Self care and impaired functional mobility. Pt displays improvement with sit to stand from elevated bed level surface this visit. Requires supervision with grooming tasks  Seated ain bedside chair. Pt will benefit from skilled OT services to maximize independence and safety with ADL/Self care and functional mobility. Recommend  SNF as the next level of care     Rehab identified problem list/impairments: Rehab identified problem list/impairments: weakness, impaired endurance, impaired self care skills, impaired cognition, impaired functional mobilty, decreased safety awareness    Rehab potential is good.    Activity tolerance: Good    Discharge recommendations: Discharge Facility/Level Of Care Needs: nursing facility, skilled     Barriers to discharge:  none identified at this time     Equipment recommendations:   will defer to next level of care    GOALS:    Occupational Therapy Goals        Problem: Occupational Therapy Goal    Goal Priority Disciplines Outcome Interventions   Occupational Therapy Goal     OT, PT/OT Ongoing (interventions implemented as appropriate)    Description:  Goals to be met by: 7/19/17 "     Patient will increase functional independence with ADLs by performing:    UE Dressing with Moderate Assistance.  LE Dressing with Moderate Assistance.  Grooming while seated with Minimal Assistance.  Toileting from bedside commode with Moderate Assistance for hygiene and clothing management.   Bathing from  edge of bed with Moderate Assistance.  Toilet transfer to bedside commode with Moderate Assistance.  Increased functional strength to WFL for B UE.  Upper extremity exercise program x15 reps per handout, with assistance as needed.                       Plan:  Patient to be seen 6 x/week to address the above listed problems via self-care/home management, therapeutic activities, cognitive retraining  Plan of Care expires: 07/19/17  Plan of Care reviewed with: patient         Luis S YENNY Montejo  07/07/2017

## 2025-02-06 NOTE — DOCUMENTATION CLARIFICATION NOTE
"    PATIENT:               KENNETH IRIZARRY  ACCT #:                  [id number]  MRN:                       [id number]  :                       1975  ADMIT DATE:       2025 5:14 PM  DISCH DATE:  RESPONDING PROVIDER #:        [id number]          PROVIDER RESPONSE TEXT:    Anemia due to chronic disease rheumatoid arthritis    CDI QUERY TEXT:    Clarification    Instruction:    Based on your assessment of the patient and the clinical information, please provide the requested documentation by clicking on the appropriate radio button and enter any additional information if prompted.    Question: Please further clarify the diagnosis of anemia as    When answering this query, please exercise your independent professional judgment. The fact that a question is being asked, does not imply that any particular answer is desired or expected.    The patient's clinical indicators include:  Clinical Information: 49 Y/F presenting with fever, right leg cellulitis and lymphedema    Clinical Indicators:     ID Progress Note: \"Sepsis secondary to bacteremia, cellulitis- tachycardia, leukocytosis, reported fever at home-resolved  Group A streptococcus bacteremia  Right leg cellulitis-risk factor is  Lymphedema, wound culture growing Staphylococcus aureus and Pseudomonas\"    Date:                     2/5  H/H:       12.0/38.8     8.7/28.7     8.6/28.3     8.5/27.6      Treatment: serial labs, Zosyn 4.5g IV q6H, Vancomycin 2g IV q8H,  Daptomycin 500mg IV x1,    Risk Factors: sepsis, rheumatoid arthritis on plaquenil  Options provided:  -- Iron deficiency anemia  -- Acute blood loss anemia  -- Chronic blood loss anemia  -- Anemia due to chronic disease, Please specify chronic disease below  -- Other - I will add my own diagnosis  -- Refer to Clinical Documentation Reviewer    Query created by: Meryl Mills on 2025 3:16 PM      Electronically signed by:  DONATO TERESA MD 2025 " 9:57 AM

## 2025-02-06 NOTE — PROGRESS NOTES
Roro Marshall is a 49 y.o. female on day 8 of admission presenting with Cellulitis of lower extremity, unspecified laterality.    Subjective   Interval History:   Afebrile, no chills  No leg pain  Mild left leg redness  No chest pain or shortness of breath  No nausea vomiting or diarrhea        Review of Systems   Constitutional:  Negative for chills and fever.   Cardiovascular:  Positive for leg swelling.   Skin:  Positive for rash and wound.   All other systems reviewed and are negative.      Objective   Range of Vitals (last 24 hours)  Heart Rate:  [69-71]   Temp:  [36.4 °C (97.5 °F)-36.5 °C (97.7 °F)]   Resp:  [16-18]   BP: (105-107)/(47-53)   SpO2:  [98 %]   Daily Weight  01/28/25 : 145 kg (320 lb)    Body mass index is 58.53 kg/m².    Physical Exam  Constitutional:       Appearance: Normal appearance.   HENT:      Head: Normocephalic and atraumatic.   Eyes:      Extraocular Movements: Extraocular movements intact.      Conjunctiva/sclera: Conjunctivae normal.   Cardiovascular:      Rate and Rhythm: Normal rate and regular rhythm.   Pulmonary:      Effort: Pulmonary effort is normal.      Breath sounds: Normal breath sounds.   Abdominal:      General: Bowel sounds are normal.      Palpations: Abdomen is soft.   Musculoskeletal:         General: Normal range of motion.      Cervical back: Normal range of motion and neck supple.      Right lower leg: Edema present.      Left lower leg: Edema present.   Skin:     General: Skin is warm and dry.      Comments: Bilateral lower extremity wrappings in place-right upper leg erythema is resolved   Neurological:      General: No focal deficit present.      Mental Status: She is alert and oriented to person, place, and time.   Psychiatric:         Mood and Affect: Mood normal.         Behavior: Behavior normal.     Antibiotics  piperacillin-tazobactam - 4.5 gram/100 mL  PIPERACILLIN-TAZOBACTAM IV 4.5 G  ML D5W (ADV/MBP)    Relevant Results  Labs  Results from  last 72 hours   Lab Units 02/06/25  0440 02/05/25  0449 02/04/25  0601   WBC AUTO x10*3/uL 7.1 8.0 9.7   HEMOGLOBIN g/dL 8.1* 8.2* 8.6*   HEMATOCRIT % 27.1* 27.6* 28.5*   PLATELETS AUTO x10*3/uL 164 164 168     Results from last 72 hours   Lab Units 02/06/25  0440 02/05/25  0449 02/04/25  0601   SODIUM mmol/L 141 139 139   POTASSIUM mmol/L 3.7 3.7 3.8   CHLORIDE mmol/L 107 106 106   CO2 mmol/L 29 28 27   BUN mg/dL 6 6 5*   CREATININE mg/dL 0.42* 0.44* 0.41*   GLUCOSE mg/dL 89 84 88   CALCIUM mg/dL 7.7* 8.1* 7.8*   ANION GAP mmol/L 9* 9* 10   EGFR mL/min/1.73m*2 >90 >90 >90         Estimated Creatinine Clearance: 125 mL/min (A) (by C-G formula based on SCr of 0.42 mg/dL (L)).  C-Reactive Protein   Date Value Ref Range Status   03/12/2024 1.80 0.00 - 2.00 mg/dL Final     CRP   Date Value Ref Range Status   09/09/2023 1.0 0 - 2.0 MG/DL Final     Comment:     Performed at [address] [address]   04/12/2023 1.0 0 - 2.0 MG/DL Final     Comment:     Performed at [address] [address]     Microbiology  Susceptibility data from last 14 days.  Collected Specimen Info Organism Aztreonam Cefepime Ceftazidime Ciprofloxacin Clindamycin Erythromycin Levofloxacin Oxacillin Penicillin Piperacillin/Tazobactam Tetracycline Tobramycin Trimethoprim/Sulfamethoxazole Vancomycin   01/29/25 Tissue/Biopsy from Wound/Tissue Methicillin Susceptible Staphylococcus aureus (MSSA)      S  S   S    S   S  S     Pseudomonas aeruginosa  R  S  S  S    I    S   S     01/28/25 Blood culture from Peripheral Venipuncture Streptococcus pyogenes (Group A Streptococcus)      S  S    S   S      01/28/25 Blood culture from Peripheral Venipuncture Streptococcus pyogenes (Group A Streptococcus)                     Imaging  Bedside PICC Imaging    Result Date: 2/3/2025  These images are not reportable by radiology and will not be interpreted by  Radiologists.    XR chest 1 view    Result Date:  1/28/2025  Interpreted By:  Kyle Villarreal, STUDY: XR CHEST 1 VIEW;  1/28/2025 6:48 pm   INDICATION: Signs/Symptoms:fever.     COMPARISON: Radiographs of the chest dated 04/10/2019.   ACCESSION NUMBER(S): [id number]   ORDERING CLINICIAN: MARIELY MCKAY   FINDINGS: AP radiograph of the chest was provided.       CARDIOMEDIASTINAL SILHOUETTE: Cardiomediastinal silhouette is mildly enlarged.   LUNGS: Somewhat low lung volumes are present with pulmonary vascular congestion and bronchovascular crowding. Trace fluid is present along the fissure in the right lung. No sizable consolidation or pleural effusion is noted.   ABDOMEN: No remarkable upper abdominal findings.   BONES: No acute osseous changes.       1.  Mild enlargement of the cardiomediastinal silhouette is pulmonary vascular congestion and trace fluid along the fissure in the right lung. No consolidation or sizable pleural effusion is evident. Correlate with fluid volume status.       MACRO: None   Signed by: Kyle Villarreal 1/28/2025 7:05 PM Dictation workstation:   [id number]     Assessment/Plan   Sepsis secondary to bacteremia, cellulitis- tachycardia, leukocytosis, reported fever at home-resolved  Group A streptococcus bacteremia, negative repeat blood cultures  Right leg cellulitis-risk factor is lymphedema, resolving  Lymphedema-wound culture growing MSSA and Pseudomonas  Leukocytosis, secondary to infection-resolved  Elevated CK-resolved     Continue IV zosyn  Local care  Bilateral leg compression  Monitor WBC and temperature  Long-term plan is IV Zosyn till 2/14/2025  PICC line placed on 2/3/2025  Weekly monitoring of CBC with differential, CMP while on antibiotic therapy  Discharge planning      Maurice Miramontes MD

## 2025-02-06 NOTE — DISCHARGE INSTR - ACTIVITY
Change BL leg dressings twice daily, clean with hibiclens wipes, apply xeroform, gauze, kerlix and ace wrap from toes to below knee.

## 2025-02-07 DIAGNOSIS — L03.115 CELLULITIS OF RIGHT LOWER EXTREMITY: ICD-10-CM

## 2025-02-08 NOTE — DOCUMENTATION CLARIFICATION NOTE
PATIENT:               KENNETH IRIZARRY  ACCT #:                  [id number]  MRN:                       [id number]  :                       1975  ADMIT DATE:       2025 5:14 PM  DISCH DATE:        2025 2:19 PM  RESPONDING PROVIDER #:        [id number]          PROVIDER RESPONSE TEXT:    Please indicate if there was the presence of sepsis induced rhabdomyolysis    CDI QUERY TEXT:    Clarification      Instruction:    Based on your assessment of the patient and the clinical information, please provide the requested documentation by clicking on the appropriate radio button and enter any additional information if prompted.    Question: Is there a diagnosis indicative of the lab values    When answering this query, please exercise your independent professional judgment. The fact that a question is being asked, does not imply that any particular answer is desired or expected.    The patient's clinical indicators include:  Clinical Information: 49 Y/F presenting with fever, right leg cellulitis and ruling in for sepsis secondary to bacteremia, cellulitis  Clinical Indicators:    Date:               Creatine Kinase:             651             347             46    Treatment: IVF bolus 1500ml then infusion at 100ml/hr, serial labs, Zosyn 4.5g IV q6H, Vancomycin 2g IV q8H,  Daptomycin 500mg IV x1    Risk Factors: fever, group A streptococcus bacteremia, lymphedema RLE with culture growing MSSA and pseudomonas  Options provided:  -- Creatine Kinase elevation is clinically significant and required treatment/monitoring  -- Creatine Kinase elevation not requiring treatment or evaluation  -- Other - I will add my own diagnosis  -- Refer to Clinical Documentation Reviewer    Query created by: Meryl Mills on 2025 3:38 PM      Electronically signed by:  TORRES TERESA MD 2025 11:45 AM

## 2025-02-09 ENCOUNTER — NURSING HOME VISIT (OUTPATIENT)
Dept: POST ACUTE CARE | Facility: EXTERNAL LOCATION | Age: 50
End: 2025-02-09
Payer: COMMERCIAL

## 2025-02-09 DIAGNOSIS — E03.9 HYPOTHYROIDISM, UNSPECIFIED TYPE: ICD-10-CM

## 2025-02-09 DIAGNOSIS — E78.5 HYPERLIPIDEMIA, UNSPECIFIED HYPERLIPIDEMIA TYPE: ICD-10-CM

## 2025-02-09 DIAGNOSIS — L03.119 CELLULITIS OF LOWER EXTREMITY, UNSPECIFIED LATERALITY: Primary | ICD-10-CM

## 2025-02-09 DIAGNOSIS — M06.9 RHEUMATOID ARTHRITIS, INVOLVING UNSPECIFIED SITE, UNSPECIFIED WHETHER RHEUMATOID FACTOR PRESENT (MULTI): ICD-10-CM

## 2025-02-09 DIAGNOSIS — M32.9 SYSTEMIC LUPUS ERYTHEMATOSUS, UNSPECIFIED SLE TYPE, UNSPECIFIED ORGAN INVOLVEMENT STATUS (MULTI): ICD-10-CM

## 2025-02-09 DIAGNOSIS — R53.1 WEAKNESS: ICD-10-CM

## 2025-02-09 DIAGNOSIS — I89.0 LYMPHEDEMA: ICD-10-CM

## 2025-02-09 DIAGNOSIS — D64.9 ANEMIA, UNSPECIFIED TYPE: ICD-10-CM

## 2025-02-09 DIAGNOSIS — Z91.81 AT RISK FOR FALLING: ICD-10-CM

## 2025-02-09 DIAGNOSIS — Q24.9 CONGENITAL HEART DEFECT: ICD-10-CM

## 2025-02-09 PROCEDURE — 99305 1ST NF CARE MODERATE MDM 35: CPT | Performed by: INTERNAL MEDICINE

## 2025-02-09 NOTE — LETTER
Patient: Roro Marshall  : 1975    Encounter Date: 2025    PLACE OF SERVICE:  [facility]    This is new/initial history and physical.    Subjective  Patient ID: Roro Marshall is a 49 y.o. female who presents for Annual Exam and new/initial history .    Ms. Roro Marshall is a 49-year-old female with history of cellulitis to her lower extremity.  She does undergo wound care as well as antibiotic therapy.  She is unable to take care for herself and requires supportive care.    Review of Systems   Constitutional:  Negative for chills and fever.   Cardiovascular:  Negative for chest pain.   All other systems reviewed and are negative.    Objective  /78   Pulse 84   Temp 36.9 °C (98.4 °F)   Resp 18     Physical Exam  Vitals reviewed.   Constitutional:       General: She is not in acute distress.     Comments: This is a well-developed, well-nourished female, sitting in a chair.   HENT:      Right Ear: Tympanic membrane, ear canal and external ear normal.      Left Ear: Tympanic membrane, ear canal and external ear normal.   Eyes:      General: No scleral icterus.     Pupils: Pupils are equal, round, and reactive to light.   Neck:      Vascular: No carotid bruit.   Cardiovascular:      Heart sounds: Normal heart sounds, S1 normal and S2 normal. No murmur heard.     No friction rub.   Pulmonary:      Effort: Pulmonary effort is normal.      Breath sounds: Normal breath sounds and air entry.   Abdominal:      Palpations: Abdomen is soft. There is no hepatomegaly, splenomegaly or mass.      Tenderness: There is no abdominal tenderness.      Comments: Abdomen shows obesity.   Musculoskeletal:         General: No swelling or deformity. Normal range of motion.      Cervical back: Neck supple.      Right lower leg: No edema.      Left lower leg: No edema.      Comments: Lower extremities showed both dressed.  There is no foul odor.  There is no color drainage.    Lymphadenopathy:      Cervical: No cervical adenopathy.      Upper Body:      Right upper body: No axillary adenopathy.      Left upper body: No axillary adenopathy.      Lower Body: No right inguinal adenopathy. No left inguinal adenopathy.   Neurological:      Mental Status: She is oriented to person, place, and time.      Cranial Nerves: Cranial nerves 2-12 are intact. No cranial nerve deficit.      Sensory: No sensory deficit.      Motor: Motor function is intact. No weakness.      Gait: Gait is intact.      Deep Tendon Reflexes: Reflexes normal.   Psychiatric:         Mood and Affect: Mood normal. Mood is not anxious or depressed. Affect is not angry.         Behavior: Behavior is not agitated.         Thought Content: Thought content normal.         Judgment: Judgment normal.     LAB WORK:  Laboratory studies were reviewed.    Assessment/Plan  Problem List Items Addressed This Visit             ICD-10-CM       Cardiac and Vasculature    Hyperlipidemia E78.5       Endocrine/Metabolic    Hypothyroidism E03.9       Infectious Diseases    Cellulitis of lower extremity, unspecified laterality - Primary L03.119       Symptoms and Signs    Lymphedema I89.0     Other Visit Diagnoses         Codes    Systemic lupus erythematosus, unspecified SLE type, unspecified organ involvement status (Multi)     M32.9    Rheumatoid arthritis, involving unspecified site, unspecified whether rheumatoid factor present (Multi)     M06.9    Anemia, unspecified type     D64.9    Weakness     R53.1    At risk for falling     Z91.81        1. Cellulitis to the lower extremities, on antibiotic.  2. Lymphedema with compression wrap.  3. SLE, on medication.  4. Rheumatoid arthritis, on medication.  5. Anemia.  Follow CBC.  6. Hypothyroidism, on levothyroxine.  7. Hyperlipidemia, on statin.  8. Weakness, on PT/OT.  9. Fall risk, on fall precautions.    Scribe Attestation  By signing my name below, IAngela, Scribe attest that this  documentation has been prepared under the direction and in the presence of Sakina Lind MD.     All medical record entries made by the scribe were personally dictated by me I have reviewed the chart and agree the record accurately reflects my personal performance of his history physical examination and management      Electronically Signed By: Sakina Lind MD   2/19/25  1:29 AM

## 2025-02-11 ENCOUNTER — LAB REQUISITION (OUTPATIENT)
Dept: LAB | Facility: HOSPITAL | Age: 50
End: 2025-02-11
Payer: COMMERCIAL

## 2025-02-11 DIAGNOSIS — L03.119 CELLULITIS OF UNSPECIFIED PART OF LIMB: ICD-10-CM

## 2025-02-11 LAB
ANION GAP SERPL CALC-SCNC: 11 MMOL/L (ref 10–20)
BASOPHILS # BLD AUTO: 0.02 X10*3/UL (ref 0–0.1)
BASOPHILS NFR BLD AUTO: 0.3 %
BUN SERPL-MCNC: 5 MG/DL (ref 6–23)
CALCIUM SERPL-MCNC: 9 MG/DL (ref 8.6–10.3)
CHLORIDE SERPL-SCNC: 103 MMOL/L (ref 98–107)
CO2 SERPL-SCNC: 29 MMOL/L (ref 21–32)
CREAT SERPL-MCNC: 0.49 MG/DL (ref 0.5–1.05)
DACRYOCYTES BLD QL SMEAR: NORMAL
EGFRCR SERPLBLD CKD-EPI 2021: >90 ML/MIN/1.73M*2
EOSINOPHIL # BLD AUTO: 0.25 X10*3/UL (ref 0–0.7)
EOSINOPHIL NFR BLD AUTO: 4 %
ERYTHROCYTE [DISTWIDTH] IN BLOOD BY AUTOMATED COUNT: 16.9 % (ref 11.5–14.5)
GIANT PLATELETS BLD QL SMEAR: NORMAL
GLUCOSE SERPL-MCNC: 74 MG/DL (ref 74–99)
HCT VFR BLD AUTO: 33.1 % (ref 36–46)
HGB BLD-MCNC: 9.8 G/DL (ref 12–16)
IMM GRANULOCYTES # BLD AUTO: 0.03 X10*3/UL (ref 0–0.7)
IMM GRANULOCYTES NFR BLD AUTO: 0.5 % (ref 0–0.9)
LYMPHOCYTES # BLD AUTO: 1.39 X10*3/UL (ref 1.2–4.8)
LYMPHOCYTES NFR BLD AUTO: 22.5 %
MCH RBC QN AUTO: 28.7 PG (ref 26–34)
MCHC RBC AUTO-ENTMCNC: 29.6 G/DL (ref 32–36)
MCV RBC AUTO: 97 FL (ref 80–100)
MONOCYTES # BLD AUTO: 0.49 X10*3/UL (ref 0.1–1)
MONOCYTES NFR BLD AUTO: 7.9 %
NEUTROPHILS # BLD AUTO: 4.01 X10*3/UL (ref 1.2–7.7)
NEUTROPHILS NFR BLD AUTO: 64.8 %
NRBC BLD-RTO: ABNORMAL /100{WBCS}
PLATELET # BLD AUTO: 262 X10*3/UL (ref 150–450)
POLYCHROMASIA BLD QL SMEAR: NORMAL
POTASSIUM SERPL-SCNC: 4.3 MMOL/L (ref 3.5–5.3)
RBC # BLD AUTO: 3.42 X10*6/UL (ref 4–5.2)
RBC MORPH BLD: NORMAL
SODIUM SERPL-SCNC: 139 MMOL/L (ref 136–145)
WBC # BLD AUTO: 6.2 X10*3/UL (ref 4.4–11.3)

## 2025-02-11 PROCEDURE — 85025 COMPLETE CBC W/AUTO DIFF WBC: CPT

## 2025-02-11 PROCEDURE — 80048 BASIC METABOLIC PNL TOTAL CA: CPT

## 2025-02-16 ENCOUNTER — NURSING HOME VISIT (OUTPATIENT)
Dept: POST ACUTE CARE | Facility: EXTERNAL LOCATION | Age: 50
End: 2025-02-16
Payer: COMMERCIAL

## 2025-02-16 DIAGNOSIS — I89.0 LYMPHEDEMA: ICD-10-CM

## 2025-02-16 DIAGNOSIS — L03.119 CELLULITIS OF LOWER EXTREMITY, UNSPECIFIED LATERALITY: Primary | ICD-10-CM

## 2025-02-16 DIAGNOSIS — E78.5 HYPERLIPIDEMIA, UNSPECIFIED HYPERLIPIDEMIA TYPE: ICD-10-CM

## 2025-02-16 DIAGNOSIS — R53.1 WEAKNESS: ICD-10-CM

## 2025-02-16 DIAGNOSIS — M06.9 RHEUMATOID ARTHRITIS, INVOLVING UNSPECIFIED SITE, UNSPECIFIED WHETHER RHEUMATOID FACTOR PRESENT (MULTI): ICD-10-CM

## 2025-02-16 DIAGNOSIS — M32.9 SYSTEMIC LUPUS ERYTHEMATOSUS, UNSPECIFIED SLE TYPE, UNSPECIFIED ORGAN INVOLVEMENT STATUS (MULTI): ICD-10-CM

## 2025-02-16 DIAGNOSIS — E03.9 HYPOTHYROIDISM, UNSPECIFIED TYPE: ICD-10-CM

## 2025-02-16 DIAGNOSIS — D64.9 ANEMIA, UNSPECIFIED TYPE: ICD-10-CM

## 2025-02-16 DIAGNOSIS — Z91.81 AT RISK FOR FALLING: ICD-10-CM

## 2025-02-16 PROCEDURE — 99309 SBSQ NF CARE MODERATE MDM 30: CPT | Performed by: INTERNAL MEDICINE

## 2025-02-16 NOTE — LETTER
Patient: Roro Marshall  : 1975    Encounter Date: 2025    PLACE OF SERVICE:  [facility].    This is a subsequent visit.    Subjective  Patient ID: Roro Marshall is a 49 y.o. female who presents for Follow-up.    Ms. Aracelis Marshall is a 49-year-old female with history of cellulitis with recent bacteremia.  She is finishing antibiotics.  She requires supportive care.    Review of Systems   Constitutional:  Negative for chills and fever.   Cardiovascular:  Negative for chest pain.   All other systems reviewed and are negative.    Objective  /76   Pulse 76   Temp 36.6 °C (97.9 °F)   Resp 16     Physical Exam  Vitals reviewed.   Constitutional:       General: She is not in acute distress.     Comments: This is a well-developed, well-nourished female, sitting in a chair.   HENT:      Right Ear: Tympanic membrane, ear canal and external ear normal.      Left Ear: Tympanic membrane, ear canal and external ear normal.   Eyes:      General: No scleral icterus.     Pupils: Pupils are equal, round, and reactive to light.   Neck:      Vascular: No carotid bruit.   Cardiovascular:      Heart sounds: Normal heart sounds, S1 normal and S2 normal. No murmur heard.     No friction rub.   Pulmonary:      Effort: Pulmonary effort is normal.      Breath sounds: Normal breath sounds and air entry.   Abdominal:      Palpations: There is no hepatomegaly, splenomegaly or mass.   Musculoskeletal:         General: No swelling or deformity. Normal range of motion.      Cervical back: Neck supple.      Right lower leg: No edema.      Left lower leg: No edema.      Comments: Lower extremities show residual erythema with bilateral lymphedema present.   Lymphadenopathy:      Cervical: No cervical adenopathy.      Upper Body:      Right upper body: No axillary adenopathy.      Left upper body: No axillary adenopathy.      Lower Body: No right inguinal adenopathy. No left inguinal adenopathy.    Neurological:      Mental Status: She is oriented to person, place, and time.      Cranial Nerves: Cranial nerves 2-12 are intact. No cranial nerve deficit.      Sensory: No sensory deficit.      Motor: Motor function is intact. No weakness.      Gait: Gait is intact.      Deep Tendon Reflexes: Reflexes normal.   Psychiatric:         Mood and Affect: Mood normal. Mood is not anxious or depressed. Affect is not angry.         Behavior: Behavior is not agitated.         Thought Content: Thought content normal.         Judgment: Judgment normal.     LAB WORK: Laboratory studies reviewed.    Assessment/Plan  Problem List Items Addressed This Visit             ICD-10-CM       Cardiac and Vasculature    Hyperlipidemia E78.5       Endocrine/Metabolic    Hypothyroidism E03.9       Infectious Diseases    Cellulitis of lower extremity, unspecified laterality - Primary L03.119       Symptoms and Signs    Lymphedema I89.0     Other Visit Diagnoses         Codes    Anemia, unspecified type     D64.9    Rheumatoid arthritis, involving unspecified site, unspecified whether rheumatoid factor present (Multi)     M06.9    Systemic lupus erythematosus, unspecified SLE type, unspecified organ involvement status (Multi)     M32.9    Weakness     R53.1    At risk for falling     Z91.81        1. Cellulitis, finished antibiotic.  2. Lymphedema, continue compression wrap.  3. Anemia, follow CBC.  4. Rheumatoid arthritis, on medication.  5. SLE, on medication.  6. Hypothyroidism, on levothyroxine.  7. Hyperlipidemia, on statin.  8. Weakness, on PT/OT.  9. Fall risk, fall precautions.    Scribe Attestation  By signing my name below, IHollie Scribe attest that this documentation has been prepared under the direction and in the presence of Sakina Lind MD.     All medical record entries made by the scribe were personally dictated by me I have reviewed the chart and agree the record accurately reflects my personal performance of his  history physical examination and management      Electronically Signed By: Sakina Lind MD   2/23/25  1:07 AM

## 2025-02-17 ENCOUNTER — OFFICE VISIT (OUTPATIENT)
Dept: WOUND CARE | Facility: HOSPITAL | Age: 50
End: 2025-02-17
Payer: COMMERCIAL

## 2025-02-17 PROCEDURE — 99214 OFFICE O/P EST MOD 30 MIN: CPT

## 2025-02-18 VITALS
TEMPERATURE: 98.4 F | RESPIRATION RATE: 18 BRPM | SYSTOLIC BLOOD PRESSURE: 124 MMHG | HEART RATE: 84 BPM | DIASTOLIC BLOOD PRESSURE: 78 MMHG

## 2025-02-18 ASSESSMENT — ENCOUNTER SYMPTOMS
FEVER: 0
CHILLS: 0

## 2025-02-18 NOTE — PROGRESS NOTES
PLACE OF SERVICE:  [facility]    This is new/initial history and physical.    Subjective   Patient ID: Roro Marshall is a 49 y.o. female who presents for Annual Exam and new/initial history .    Ms. Roro Marshall is a 49-year-old female with history of cellulitis to her lower extremity.  She does undergo wound care as well as antibiotic therapy.  She is unable to take care for herself and requires supportive care.    Review of Systems   Constitutional:  Negative for chills and fever.   Cardiovascular:  Negative for chest pain.   All other systems reviewed and are negative.    Objective   /78   Pulse 84   Temp 36.9 °C (98.4 °F)   Resp 18     Physical Exam  Vitals reviewed.   Constitutional:       General: She is not in acute distress.     Comments: This is a well-developed, well-nourished female, sitting in a chair.   HENT:      Right Ear: Tympanic membrane, ear canal and external ear normal.      Left Ear: Tympanic membrane, ear canal and external ear normal.   Eyes:      General: No scleral icterus.     Pupils: Pupils are equal, round, and reactive to light.   Neck:      Vascular: No carotid bruit.   Cardiovascular:      Heart sounds: Normal heart sounds, S1 normal and S2 normal. No murmur heard.     No friction rub.   Pulmonary:      Effort: Pulmonary effort is normal.      Breath sounds: Normal breath sounds and air entry.   Abdominal:      Palpations: Abdomen is soft. There is no hepatomegaly, splenomegaly or mass.      Tenderness: There is no abdominal tenderness.      Comments: Abdomen shows obesity.   Musculoskeletal:         General: No swelling or deformity. Normal range of motion.      Cervical back: Neck supple.      Right lower leg: No edema.      Left lower leg: No edema.      Comments: Lower extremities showed both dressed.  There is no foul odor.  There is no color drainage.   Lymphadenopathy:      Cervical: No cervical adenopathy.      Upper Body:      Right  upper body: No axillary adenopathy.      Left upper body: No axillary adenopathy.      Lower Body: No right inguinal adenopathy. No left inguinal adenopathy.   Neurological:      Mental Status: She is oriented to person, place, and time.      Cranial Nerves: Cranial nerves 2-12 are intact. No cranial nerve deficit.      Sensory: No sensory deficit.      Motor: Motor function is intact. No weakness.      Gait: Gait is intact.      Deep Tendon Reflexes: Reflexes normal.   Psychiatric:         Mood and Affect: Mood normal. Mood is not anxious or depressed. Affect is not angry.         Behavior: Behavior is not agitated.         Thought Content: Thought content normal.         Judgment: Judgment normal.     LAB WORK:  Laboratory studies were reviewed.    Assessment/Plan   Problem List Items Addressed This Visit             ICD-10-CM       Cardiac and Vasculature    Hyperlipidemia E78.5       Endocrine/Metabolic    Hypothyroidism E03.9       Infectious Diseases    Cellulitis of lower extremity, unspecified laterality - Primary L03.119       Symptoms and Signs    Lymphedema I89.0     Other Visit Diagnoses         Codes    Systemic lupus erythematosus, unspecified SLE type, unspecified organ involvement status (Multi)     M32.9    Rheumatoid arthritis, involving unspecified site, unspecified whether rheumatoid factor present (Multi)     M06.9    Anemia, unspecified type     D64.9    Weakness     R53.1    At risk for falling     Z91.81        1. Cellulitis to the lower extremities, on antibiotic.  2. Lymphedema with compression wrap.  3. SLE, on medication.  4. Rheumatoid arthritis, on medication.  5. Anemia.  Follow CBC.  6. Hypothyroidism, on levothyroxine.  7. Hyperlipidemia, on statin.  8. Weakness, on PT/OT.  9. Fall risk, on fall precautions.    Scribe Attestation  By signing my name below, Angela RM Scribe attest that this documentation has been prepared under the direction and in the presence of Sakina Lind  MD.     All medical record entries made by the scribe were personally dictated by me I have reviewed the chart and agree the record accurately reflects my personal performance of his history physical examination and management

## 2025-02-22 VITALS
TEMPERATURE: 97.9 F | HEART RATE: 76 BPM | SYSTOLIC BLOOD PRESSURE: 124 MMHG | DIASTOLIC BLOOD PRESSURE: 76 MMHG | RESPIRATION RATE: 16 BRPM

## 2025-02-22 ASSESSMENT — ENCOUNTER SYMPTOMS
FEVER: 0
CHILLS: 0

## 2025-02-22 NOTE — PROGRESS NOTES
PLACE OF SERVICE:  [facility].    This is a subsequent visit.    Subjective   Patient ID: Roro Marshall is a 49 y.o. female who presents for Follow-up.    Ms. Aracelis Marshall is a 49-year-old female with history of cellulitis with recent bacteremia.  She is finishing antibiotics.  She requires supportive care.    Review of Systems   Constitutional:  Negative for chills and fever.   Cardiovascular:  Negative for chest pain.   All other systems reviewed and are negative.    Objective   /76   Pulse 76   Temp 36.6 °C (97.9 °F)   Resp 16     Physical Exam  Vitals reviewed.   Constitutional:       General: She is not in acute distress.     Comments: This is a well-developed, well-nourished female, sitting in a chair.   HENT:      Right Ear: Tympanic membrane, ear canal and external ear normal.      Left Ear: Tympanic membrane, ear canal and external ear normal.   Eyes:      General: No scleral icterus.     Pupils: Pupils are equal, round, and reactive to light.   Neck:      Vascular: No carotid bruit.   Cardiovascular:      Heart sounds: Normal heart sounds, S1 normal and S2 normal. No murmur heard.     No friction rub.   Pulmonary:      Effort: Pulmonary effort is normal.      Breath sounds: Normal breath sounds and air entry.   Abdominal:      Palpations: There is no hepatomegaly, splenomegaly or mass.   Musculoskeletal:         General: No swelling or deformity. Normal range of motion.      Cervical back: Neck supple.      Right lower leg: No edema.      Left lower leg: No edema.      Comments: Lower extremities show residual erythema with bilateral lymphedema present.   Lymphadenopathy:      Cervical: No cervical adenopathy.      Upper Body:      Right upper body: No axillary adenopathy.      Left upper body: No axillary adenopathy.      Lower Body: No right inguinal adenopathy. No left inguinal adenopathy.   Neurological:      Mental Status: She is oriented to person, place, and  time.      Cranial Nerves: Cranial nerves 2-12 are intact. No cranial nerve deficit.      Sensory: No sensory deficit.      Motor: Motor function is intact. No weakness.      Gait: Gait is intact.      Deep Tendon Reflexes: Reflexes normal.   Psychiatric:         Mood and Affect: Mood normal. Mood is not anxious or depressed. Affect is not angry.         Behavior: Behavior is not agitated.         Thought Content: Thought content normal.         Judgment: Judgment normal.     LAB WORK: Laboratory studies reviewed.    Assessment/Plan   Problem List Items Addressed This Visit             ICD-10-CM       Cardiac and Vasculature    Hyperlipidemia E78.5       Endocrine/Metabolic    Hypothyroidism E03.9       Infectious Diseases    Cellulitis of lower extremity, unspecified laterality - Primary L03.119       Symptoms and Signs    Lymphedema I89.0     Other Visit Diagnoses         Codes    Anemia, unspecified type     D64.9    Rheumatoid arthritis, involving unspecified site, unspecified whether rheumatoid factor present (Multi)     M06.9    Systemic lupus erythematosus, unspecified SLE type, unspecified organ involvement status (Multi)     M32.9    Weakness     R53.1    At risk for falling     Z91.81        1. Cellulitis, finished antibiotic.  2. Lymphedema, continue compression wrap.  3. Anemia, follow CBC.  4. Rheumatoid arthritis, on medication.  5. SLE, on medication.  6. Hypothyroidism, on levothyroxine.  7. Hyperlipidemia, on statin.  8. Weakness, on PT/OT.  9. Fall risk, fall precautions.    Scribe Attestation  By signing my name below, IHollie Scribe attest that this documentation has been prepared under the direction and in the presence of Sakina Lind MD.     All medical record entries made by the scribe were personally dictated by me I have reviewed the chart and agree the record accurately reflects my personal performance of his history physical examination and management

## 2025-02-24 ENCOUNTER — APPOINTMENT (OUTPATIENT)
Dept: WOUND CARE | Facility: HOSPITAL | Age: 50
End: 2025-02-24
Payer: COMMERCIAL

## 2025-03-10 ENCOUNTER — APPOINTMENT (OUTPATIENT)
Dept: WOUND CARE | Facility: HOSPITAL | Age: 50
End: 2025-03-10
Payer: COMMERCIAL

## 2025-03-14 ENCOUNTER — OFFICE VISIT (OUTPATIENT)
Dept: WOUND CARE | Facility: HOSPITAL | Age: 50
End: 2025-03-14
Payer: COMMERCIAL

## 2025-03-14 PROCEDURE — 97597 DBRDMT OPN WND 1ST 20 CM/<: CPT

## 2025-03-14 PROCEDURE — 29580 STRAPPING UNNA BOOT: CPT | Mod: RT

## 2025-03-17 ENCOUNTER — APPOINTMENT (OUTPATIENT)
Dept: WOUND CARE | Facility: HOSPITAL | Age: 50
End: 2025-03-17
Payer: COMMERCIAL

## 2025-03-25 ENCOUNTER — OFFICE VISIT (OUTPATIENT)
Dept: WOUND CARE | Facility: HOSPITAL | Age: 50
End: 2025-03-25
Payer: COMMERCIAL

## 2025-03-25 PROCEDURE — 29580 STRAPPING UNNA BOOT: CPT | Mod: 50

## 2025-04-01 ENCOUNTER — OFFICE VISIT (OUTPATIENT)
Dept: WOUND CARE | Facility: HOSPITAL | Age: 50
End: 2025-04-01
Payer: COMMERCIAL

## 2025-04-01 PROCEDURE — 29580 STRAPPING UNNA BOOT: CPT | Mod: 50

## 2025-04-08 ENCOUNTER — OFFICE VISIT (OUTPATIENT)
Dept: WOUND CARE | Facility: HOSPITAL | Age: 50
End: 2025-04-08
Payer: COMMERCIAL

## 2025-04-08 PROCEDURE — 29580 STRAPPING UNNA BOOT: CPT | Mod: 50

## 2025-04-15 ENCOUNTER — APPOINTMENT (OUTPATIENT)
Dept: WOUND CARE | Facility: HOSPITAL | Age: 50
End: 2025-04-15
Payer: COMMERCIAL

## 2025-04-18 ENCOUNTER — OFFICE VISIT (OUTPATIENT)
Dept: WOUND CARE | Facility: HOSPITAL | Age: 50
End: 2025-04-18
Payer: COMMERCIAL

## 2025-04-18 PROCEDURE — 29580 STRAPPING UNNA BOOT: CPT | Mod: 50

## 2025-04-25 ENCOUNTER — OFFICE VISIT (OUTPATIENT)
Dept: WOUND CARE | Facility: HOSPITAL | Age: 50
End: 2025-04-25
Payer: COMMERCIAL

## 2025-04-25 PROCEDURE — 29580 STRAPPING UNNA BOOT: CPT | Mod: 50

## 2025-05-02 ENCOUNTER — OFFICE VISIT (OUTPATIENT)
Dept: WOUND CARE | Facility: HOSPITAL | Age: 50
End: 2025-05-02
Payer: COMMERCIAL

## 2025-05-02 PROCEDURE — 29580 STRAPPING UNNA BOOT: CPT | Mod: 50

## 2025-05-09 ENCOUNTER — OFFICE VISIT (OUTPATIENT)
Dept: WOUND CARE | Facility: HOSPITAL | Age: 50
End: 2025-05-09
Payer: COMMERCIAL

## 2025-05-09 PROCEDURE — 29580 STRAPPING UNNA BOOT: CPT | Mod: 50

## 2025-05-16 ENCOUNTER — OFFICE VISIT (OUTPATIENT)
Dept: WOUND CARE | Facility: HOSPITAL | Age: 50
End: 2025-05-16
Payer: COMMERCIAL

## 2025-05-16 PROCEDURE — 29580 STRAPPING UNNA BOOT: CPT | Mod: 50

## 2025-05-23 ENCOUNTER — OFFICE VISIT (OUTPATIENT)
Dept: WOUND CARE | Facility: HOSPITAL | Age: 50
End: 2025-05-23
Payer: COMMERCIAL

## 2025-05-23 PROCEDURE — 29580 STRAPPING UNNA BOOT: CPT | Mod: 50

## 2025-05-30 ENCOUNTER — OFFICE VISIT (OUTPATIENT)
Dept: WOUND CARE | Facility: HOSPITAL | Age: 50
End: 2025-05-30
Payer: COMMERCIAL

## 2025-05-30 PROCEDURE — 29580 STRAPPING UNNA BOOT: CPT | Mod: 50

## 2025-06-06 ENCOUNTER — OFFICE VISIT (OUTPATIENT)
Dept: WOUND CARE | Facility: HOSPITAL | Age: 50
End: 2025-06-06
Payer: COMMERCIAL

## 2025-06-06 PROCEDURE — 29580 STRAPPING UNNA BOOT: CPT | Mod: 50

## 2025-06-13 ENCOUNTER — OFFICE VISIT (OUTPATIENT)
Dept: WOUND CARE | Facility: HOSPITAL | Age: 50
End: 2025-06-13
Payer: COMMERCIAL

## 2025-06-13 PROCEDURE — 99214 OFFICE O/P EST MOD 30 MIN: CPT

## 2025-06-20 ENCOUNTER — APPOINTMENT (OUTPATIENT)
Dept: WOUND CARE | Facility: HOSPITAL | Age: 50
End: 2025-06-20
Payer: COMMERCIAL

## 2025-06-24 ENCOUNTER — OFFICE VISIT (OUTPATIENT)
Dept: WOUND CARE | Facility: HOSPITAL | Age: 50
End: 2025-06-24
Payer: COMMERCIAL

## 2025-06-24 PROCEDURE — 29580 STRAPPING UNNA BOOT: CPT | Mod: 50

## 2025-06-27 ENCOUNTER — CLINICAL SUPPORT (OUTPATIENT)
Dept: WOUND CARE | Facility: HOSPITAL | Age: 50
End: 2025-06-27
Payer: COMMERCIAL

## 2025-06-27 PROCEDURE — 29580 STRAPPING UNNA BOOT: CPT | Mod: 91

## 2025-07-01 ENCOUNTER — OFFICE VISIT (OUTPATIENT)
Dept: WOUND CARE | Facility: HOSPITAL | Age: 50
End: 2025-07-01
Payer: COMMERCIAL

## 2025-07-01 DIAGNOSIS — L03.90 WOUND CELLULITIS: ICD-10-CM

## 2025-07-01 PROCEDURE — 29580 STRAPPING UNNA BOOT: CPT | Mod: 50

## 2025-07-05 LAB
BACTERIA SPEC AEROBE CULT: ABNORMAL
BACTERIA SPEC ANAEROBE CULT: ABNORMAL

## 2025-07-07 LAB
BACTERIA SPEC AEROBE CULT: ABNORMAL
BACTERIA SPEC ANAEROBE CULT: ABNORMAL

## 2025-07-08 ENCOUNTER — OFFICE VISIT (OUTPATIENT)
Dept: WOUND CARE | Facility: HOSPITAL | Age: 50
End: 2025-07-08
Payer: COMMERCIAL

## 2025-07-08 PROCEDURE — 11042 DBRDMT SUBQ TIS 1ST 20SQCM/<: CPT

## 2025-07-08 PROCEDURE — 29580 STRAPPING UNNA BOOT: CPT | Mod: RT

## 2025-07-15 ENCOUNTER — OFFICE VISIT (OUTPATIENT)
Dept: WOUND CARE | Facility: HOSPITAL | Age: 50
End: 2025-07-15
Payer: COMMERCIAL

## 2025-07-15 PROCEDURE — 29580 STRAPPING UNNA BOOT: CPT | Mod: 50

## 2025-07-22 ENCOUNTER — CLINICAL SUPPORT (OUTPATIENT)
Dept: WOUND CARE | Facility: HOSPITAL | Age: 50
End: 2025-07-22
Payer: COMMERCIAL

## 2025-07-22 PROCEDURE — 99212 OFFICE O/P EST SF 10 MIN: CPT
